# Patient Record
Sex: FEMALE | Race: WHITE | NOT HISPANIC OR LATINO | Employment: FULL TIME | URBAN - METROPOLITAN AREA
[De-identification: names, ages, dates, MRNs, and addresses within clinical notes are randomized per-mention and may not be internally consistent; named-entity substitution may affect disease eponyms.]

---

## 2017-10-11 ENCOUNTER — OFFICE VISIT (OUTPATIENT)
Dept: INTERNAL MEDICINE | Facility: MEDICAL CENTER | Age: 47
End: 2017-10-11

## 2017-10-11 VITALS
DIASTOLIC BLOOD PRESSURE: 106 MMHG | HEART RATE: 95 BPM | RESPIRATION RATE: 18 BRPM | OXYGEN SATURATION: 92 % | WEIGHT: 213.2 LBS | HEIGHT: 62 IN | BODY MASS INDEX: 39.23 KG/M2 | SYSTOLIC BLOOD PRESSURE: 142 MMHG | TEMPERATURE: 98.2 F

## 2017-10-11 DIAGNOSIS — Z76.89 ESTABLISHING CARE WITH NEW DOCTOR, ENCOUNTER FOR: ICD-10-CM

## 2017-10-11 DIAGNOSIS — F31.31 BIPOLAR AFFECTIVE DISORDER, CURRENTLY DEPRESSED, MILD (HCC): ICD-10-CM

## 2017-10-11 DIAGNOSIS — I10 ESSENTIAL HYPERTENSION: ICD-10-CM

## 2017-10-11 DIAGNOSIS — E03.9 HYPOTHYROIDISM, UNSPECIFIED TYPE: ICD-10-CM

## 2017-10-11 DIAGNOSIS — J45.909 UNCOMPLICATED ASTHMA, UNSPECIFIED ASTHMA SEVERITY, UNSPECIFIED WHETHER PERSISTENT: ICD-10-CM

## 2017-10-11 PROCEDURE — 99203 OFFICE O/P NEW LOW 30 MIN: CPT | Mod: GE | Performed by: INTERNAL MEDICINE

## 2017-10-11 RX ORDER — ALBUTEROL SULFATE 90 UG/1
2 AEROSOL, METERED RESPIRATORY (INHALATION) EVERY 6 HOURS PRN
Qty: 8.5 G | Refills: 0 | Status: SHIPPED | OUTPATIENT
Start: 2017-10-11 | End: 2019-02-19

## 2017-10-11 RX ORDER — HYDROCHLOROTHIAZIDE 25 MG/1
25 TABLET ORAL DAILY
COMMUNITY
End: 2017-10-11

## 2017-10-11 RX ORDER — CHLORTHALIDONE 25 MG/1
25 TABLET ORAL DAILY
Qty: 30 TAB | Refills: 0 | Status: SHIPPED | OUTPATIENT
Start: 2017-10-11 | End: 2017-11-13 | Stop reason: SDUPTHER

## 2017-10-11 RX ORDER — ALBUTEROL SULFATE 90 UG/1
2 AEROSOL, METERED RESPIRATORY (INHALATION) EVERY 6 HOURS PRN
COMMUNITY
End: 2017-10-11 | Stop reason: SDUPTHER

## 2017-10-11 RX ORDER — OMEPRAZOLE 40 MG/1
40 CAPSULE, DELAYED RELEASE ORAL DAILY
COMMUNITY
End: 2018-01-08 | Stop reason: SDUPTHER

## 2017-10-11 RX ORDER — FLUOXETINE HYDROCHLORIDE 40 MG/1
40 CAPSULE ORAL DAILY
COMMUNITY
End: 2017-10-11 | Stop reason: SDUPTHER

## 2017-10-11 RX ORDER — BUPROPION HYDROCHLORIDE 150 MG/1
150 TABLET, EXTENDED RELEASE ORAL 2 TIMES DAILY
Qty: 60 TAB | Refills: 0 | Status: SHIPPED | OUTPATIENT
Start: 2017-10-11 | End: 2017-11-13 | Stop reason: SDUPTHER

## 2017-10-11 RX ORDER — BUPROPION HYDROCHLORIDE 150 MG/1
150 TABLET, EXTENDED RELEASE ORAL 2 TIMES DAILY
COMMUNITY
End: 2017-10-11 | Stop reason: SDUPTHER

## 2017-10-11 RX ORDER — GEMFIBROZIL 600 MG/1
600 TABLET, FILM COATED ORAL 2 TIMES DAILY
COMMUNITY
End: 2018-01-08 | Stop reason: SDUPTHER

## 2017-10-11 RX ORDER — FLUOXETINE HYDROCHLORIDE 40 MG/1
40 CAPSULE ORAL DAILY
Qty: 30 CAP | Refills: 0 | Status: SHIPPED | OUTPATIENT
Start: 2017-10-11 | End: 2017-11-13 | Stop reason: SDUPTHER

## 2017-10-11 ASSESSMENT — PAIN SCALES - GENERAL: PAINLEVEL: NO PAIN

## 2017-10-11 NOTE — LETTER
Counts include 234 beds at the [facility]  Evaristo Mary M.D.  [address] [facility] [address]  [place] [address]  Fax: [phone]   Authorization for Release/Disclosure of   Protected Health Information   Name: ERIC TORIBIO : 1970 SSN: [id number]   Address: [address] [place] [address] Phone:    [phone] (home)    I authorize the entity listed below to release/disclose the PHI below to:   Counts include 234 beds at the [facility]/Evaristo Mary M.D. and Evaristo Mary M.D.   Provider or Entity Name:  [facility],   Address   City, [facility], Zip  [address] [place] [address] Phone:[phone]      Fax:[phone]     Reason for request: continuity of care   Information to be released:    [  ] LAST COLONOSCOPY,  including any PATH REPORT and follow-up  [  ] LAST FIT/COLOGUARD RESULT [  ] LAST DEXA  [  ] LAST MAMMOGRAM  [  ] LAST PAP  [  ] LAST LABS [  ] RETINA EXAM REPORT  [  ] IMMUNIZATION RECORDS  [  ] Release all info      [  ] Check here and initial the line next to each item to release ALL health information INCLUDING  _____ Care and treatment for drug and / or alcohol abuse  _____ HIV testing, infection status, or AIDS  _____ Genetic Testing    DATES OF SERVICE OR TIME PERIOD TO BE DISCLOSED: _____________  I understand and acknowledge that:  * This Authorization may be revoked at any time by you in writing, except if your health information has already been used or disclosed.  * Your health information that will be used or disclosed as a result of you signing this authorization could be re-disclosed by the recipient. If this occurs, your re-disclosed health information may no longer be protected by State or Federal laws.  * You may refuse to sign this Authorization. Your refusal will not affect your ability to obtain treatment.  * This Authorization becomes effective upon signing and will  on (date) __________.      If no date is indicated, this Authorization will  one (1) year  from the signature date.    Name: Margareth Braun    Signature:   Date:     10/11/2017       PLEASE FAX REQUESTED RECORDS BACK TO: [phone]

## 2017-10-11 NOTE — PATIENT INSTRUCTIONS
Bipolar Disorder  Bipolar disorder is a mental illness. The term bipolar disorder actually is used to describe a group of disorders that all share varying degrees of emotional highs and lows that can interfere with daily functioning, such as work, school, or relationships. Bipolar disorder also can lead to drug abuse, hospitalization, and suicide.  The emotional highs of bipolar disorder are periods of elation or irritability and high energy. These highs can range from a mild form (hypomania) to a severe form (checo). People experiencing episodes of hypomania may appear energetic, excitable, and highly productive. People experiencing checo may behave impulsively or erratically. They often make poor decisions. They may have difficulty sleeping. The most severe episodes of checo can involve having very distorted beliefs or perceptions about the world and seeing or hearing things that are not real (psychotic delusions and hallucinations).   The emotional lows of bipolar disorder (depression) also can range from mild to severe. Severe episodes of bipolar depression can involve psychotic delusions and hallucinations.  Sometimes people with bipolar disorder experience a state of mixed mood. Symptoms of hypomania or checo and depression are both present during this mixed-mood episode.  SIGNS AND SYMPTOMS  There are signs and symptoms of the episodes of hypomania and checo as well as the episodes of depression. The signs and symptoms of hypomania and checo are similar but vary in severity. They include:  · Inflated self-esteem or feeling of increased self-confidence.  · Decreased need for sleep.  · Unusual talkativeness (rapid or pressured speech) or the feeling of a need to keep talking.  · Sensation of racing thoughts or constant talking, with quick shifts between topics that may or may not be related (flight of ideas).  · Decreased ability to focus or concentrate.  · Increased purposeful activity, such as work,  studies, or social activity, or nonproductive activity, such as pacing, squirming and fidgeting, or finger and toe tapping.  · Impulsive behavior and use of poor judgment, resulting in high-risk activities, such as having unprotected sex or spending excessive amounts of money.  Signs and symptoms of depression include the following:   · Feelings of sadness, hopelessness, or helplessness.  · Frequent or uncontrollable episodes of crying.  · Lack of feeling anything or caring about anything.  · Difficulty sleeping or sleeping too much.   · Inability to enjoy the things you used to enjoy.    · Desire to be alone all the time.    · Feelings of guilt or worthlessness.   · Lack of energy or motivation.    · Difficulty concentrating, remembering, or making decisions.   · Change in appetite or weight beyond normal fluctuations.  · Thoughts of death or the desire to harm yourself.  DIAGNOSIS   Bipolar disorder is diagnosed through an assessment by your caregiver. Your caregiver will ask questions about your emotional episodes. There are two main types of bipolar disorder. People with type I bipolar disorder have manic episodes with or without depressive episodes. People with type II bipolar disorder have hypomanic episodes and major depressive episodes, which are more serious than mild depression. The type of bipolar disorder you have can make an important difference in how your illness is monitored and treated.  Your caregiver may ask questions about your medical history and use of alcohol or drugs, including prescription medication. Certain medical conditions and substances also can cause emotional highs and lows that resemble bipolar disorder (secondary bipolar disorder).   TREATMENT   Bipolar disorder is a long-term illness. It is best controlled with continuous treatment rather than treatment only when symptoms occur. The following treatments can be prescribed for bipolar disorders:  · Medication--Medication can be  prescribed by a doctor that is an expert in treating mental disorders (psychiatrists). Medications called mood stabilizers are usually prescribed to help control the illness. Other medications are sometimes added if symptoms of checo, depression, or psychotic delusions and hallucinations occur despite the use of a mood stabilizer.  · Talk therapy--Some forms of talk therapy are helpful in providing support, education, and guidance.  A combination of medication and talk therapy is best for managing the disorder over time. A procedure in which electricity is applied to your brain through your scalp (electroconvulsive therapy) is used in cases of severe checo when medication and talk therapy do not work or work too slowly.     This information is not intended to replace advice given to you by your health care provider. Make sure you discuss any questions you have with your health care provider.     Document Released: 03/26/2002 Document Revised: 01/08/2016 Document Reviewed: 01/13/2014  [employer] Interactive Patient Education ©2016 [employer]  Hypertension  Hypertension, commonly called high blood pressure, is when the force of blood pumping through your arteries is too strong. Your arteries are the blood vessels that carry blood from your heart throughout your body. A blood pressure reading consists of a higher number over a lower number, such as 110/72. The higher number (systolic) is the pressure inside your arteries when your heart pumps. The lower number (diastolic) is the pressure inside your arteries when your heart relaxes. Ideally you want your blood pressure below 120/80.  Hypertension forces your heart to work harder to pump blood. Your arteries may become narrow or stiff. Having untreated or uncontrolled hypertension can cause heart attack, stroke, kidney disease, and other problems.  RISK FACTORS  Some risk factors for high blood pressure are controllable. Others are not.   Risk factors you cannot  control include:   · Race. You may be at higher risk if you are .  · Age. Risk increases with age.  · Gender. Men are at higher risk than women before age 45 years. After age 65, women are at higher risk than men.  Risk factors you can control include:  · Not getting enough exercise or physical activity.  · Being overweight.  · Getting too much fat, sugar, calories, or salt in your diet.  · Drinking too much alcohol.  SIGNS AND SYMPTOMS  Hypertension does not usually cause signs or symptoms. Extremely high blood pressure (hypertensive crisis) may cause headache, anxiety, shortness of breath, and nosebleed.  DIAGNOSIS  To check if you have hypertension, your health care provider will measure your blood pressure while you are seated, with your arm held at the level of your heart. It should be measured at least twice using the same arm. Certain conditions can cause a difference in blood pressure between your right and left arms. A blood pressure reading that is higher than normal on one occasion does not mean that you need treatment. If it is not clear whether you have high blood pressure, you may be asked to return on a different day to have your blood pressure checked again. Or, you may be asked to monitor your blood pressure at home for 1 or more weeks.  TREATMENT  Treating high blood pressure includes making lifestyle changes and possibly taking medicine. Living a healthy lifestyle can help lower high blood pressure. You may need to change some of your habits.  Lifestyle changes may include:  · Following the DASH diet. This diet is high in fruits, vegetables, and whole grains. It is low in salt, red meat, and added sugars.  · Keep your sodium intake below 2,300 mg per day.  · Getting at least 30-45 minutes of aerobic exercise at least 4 times per week.  · Losing weight if necessary.  · Not smoking.  · Limiting alcoholic beverages.  · Learning ways to reduce stress.  Your health care provider may  prescribe medicine if lifestyle changes are not enough to get your blood pressure under control, and if one of the following is true:  · You are 18-59 years of age and your systolic blood pressure is above 140.  · You are 60 years of age or older, and your systolic blood pressure is above 150.  · Your diastolic blood pressure is above 90.  · You have diabetes, and your systolic blood pressure is over 140 or your diastolic blood pressure is over 90.  · You have kidney disease and your blood pressure is above 140/90.  · You have heart disease and your blood pressure is above 140/90.  Your personal target blood pressure may vary depending on your medical conditions, your age, and other factors.  HOME CARE INSTRUCTIONS  · Have your blood pressure rechecked as directed by your health care provider.    · Take medicines only as directed by your health care provider. Follow the directions carefully. Blood pressure medicines must be taken as prescribed. The medicine does not work as well when you skip doses. Skipping doses also puts you at risk for problems.  · Do not smoke.    · Monitor your blood pressure at home as directed by your health care provider.   SEEK MEDICAL CARE IF:   · You think you are having a reaction to medicines taken.  · You have recurrent headaches or feel dizzy.  · You have swelling in your ankles.  · You have trouble with your vision.  SEEK IMMEDIATE MEDICAL CARE IF:  · You develop a severe headache or confusion.  · You have unusual weakness, numbness, or feel faint.  · You have severe chest or abdominal pain.  · You vomit repeatedly.  · You have trouble breathing.  MAKE SURE YOU:   · Understand these instructions.  · Will watch your condition.  · Will get help right away if you are not doing well or get worse.     This information is not intended to replace advice given to you by your health care provider. Make sure you discuss any questions you have with your health care provider.     Document  Released: 12/18/2006 Document Revised: 05/03/2016 Document Reviewed: 10/10/2014  [employer] Interactive Patient Education ©2016 [employer]

## 2017-10-12 NOTE — PROGRESS NOTES
New Patient to Establish    Reason to establish: New patient to establish    CC: Establish care with new doctor, and request a medication refill.    HPI: 47 y/o female with past medical history of bipolar disorder, and hypothyroidism. Presents to the clinic to establish care and to request a refill on medication. Patient reports that she has a long history of bipolar disorder that is getting worse because she is not taking Prozac. She reports that she was having a very good response on Prozac previously. All bipolar medications was prescribed by psychiatrist but because she is self pay with no health insurance she is unable to afford the expenses of the psychiatrist office visit. Patient reports that she is unable to enjoy her daily activities and she is not able to having a good relationship with peoples.  Pt has fatigue, hair falling, weight gain, and constipation.  She has been diagnosed previously with hypothyroidism and was on medication for a while.  Pt denies depressed mood, ideas of hurting herself or others.    Patient Active Problem List    Diagnosis Date Noted   • Hypothyroidism 10/11/2017   • Establishing care with new doctor, encounter for 10/11/2017   • Uncomplicated asthma 10/11/2017   • Bipolar affective disorder, currently depressed, mild (CMS-HCC) 10/11/2017   • Essential hypertension 10/11/2017       Past Medical History:   Diagnosis Date   • Asthma    • Bipolar 1 disorder, depressed (CMS-[facility])    • Hypothyroidism        Current Outpatient Prescriptions   Medication Sig Dispense Refill   • gemfibrozil (LOPID) 600 MG Tab Take 600 mg by mouth 2 times a day.     • omeprazole (PRILOSEC) 40 MG delayed-release capsule Take 40 mg by mouth every day.     • buPROPion SR (WELLBUTRIN SR) 150 MG TABLET SR 12 HR sustained-release tablet Take 1 Tab by mouth 2 times a day. 60 Tab 0   • albuterol (VENTOLIN HFA) 108 (90 Base) MCG/ACT Aero Soln inhalation aerosol Inhale 2 Puffs by mouth every 6 hours as needed for  "Shortness of Breath. 8.5 g 0   • fluoxetine (PROZAC) 40 MG capsule Take 1 Cap by mouth every day. 30 Cap 0   • chlorthalidone (HYGROTON) 25 MG Tab Take 1 Tab by mouth every day. 30 Tab 0     No current facility-administered medications for this visit.        Allergies as of 10/11/2017 - Reviewed 10/11/2017   Allergen Reaction Noted   • Codeine Swelling 10/11/2017   • Penicillins Hives 10/11/2017       Social History     Social History   • Marital status: Single     Spouse name: N/A   • Number of children: N/A   • Years of education: N/A     Occupational History   • Not on file.     Social History Main Topics   • Smoking status: Former Smoker     Years: 25.00   • Smokeless tobacco: Never Used      Comment: 3-4 cigarrette per day   • Alcohol use No   • Drug use: No   • Sexual activity: No     Other Topics Concern   • Not on file     Social History Narrative   • No narrative on file       No family history on file.    Past Surgical History:   Procedure Laterality Date   • HYSTERECTOMY, TOTAL ABDOMINAL         ROS: As per HPI. Additional pertinent symptoms as noted below.    All others negative    /106   Pulse 95   Temp 36.8 °C (98.2 °F)   Resp 18   Ht 1.575 m (5' 2\")   Wt 96.7 kg (213 lb 3.2 oz)   SpO2 92%   Breastfeeding? No   BMI 38.99 kg/m²     Physical Exam  General:  Alert and oriented, No apparent distress.    Eyes: Pupils equal and reactive. No scleral icterus.    Throat: Clear no erythema or exudates noted.    Neck: Supple. No lymphadenopathy noted. Thyroid not enlarged.    Lungs: Clear to auscultation and percussion bilaterally.    Cardiovascular: Regular rate and rhythm. No murmurs, rubs or gallops.    Abdomen:  Benign. No rebound or guarding noted.    Extremities: No clubbing, cyanosis, edema.    Skin: Clear. No rash or suspicious skin lesions noted.    Other:     Note: I have reviewed all pertinent labs and diagnostic tests associated with this visit with specific comments listed under the " assessment and plan below    Assessment and Plan    1. Hypothyroidism, unspecified type  - Pt has been diagnosed previously with hypothyroidism and has been on medication for a while.  - Pt reports a very good response on hypothyroidism medication.  - Pt has fatigue, hair falling, weight gain, and constipation.  Plan  - TSH with T4.  - follow up in 3 weeks.    2. Establishing care with new doctor, encounter for  - Pt wants to establish care with new doctor.  - Pt has a recent lab work done in [place] and she sign the consent to release her information to us.    3. Uncomplicated asthma, unspecified asthma severity, unspecified whether persistent  - Hx of asthma for 3 years.  - the recent asthma attack was long time ago (Pt does not remember when)  - Pt using albuterol PRN (about once in a month)  - On examination, lungs are clear bilaterally with no additional sounds or wheezes.  Plan  - Refill albuterol and continue on same medication PRN.    4. Bipolar affective disorder, currently depressed, mild (CMS-HCC)  - long Hx of Bipolar disorder with depression.  - Pt reports a very good response on Prozac previously.  Plan  - prescribe Prozac 40 mg cap.  - follow up in 3 weeks.    5. Essential hypertension  - Hx of HTN treated with hydrochlorothiazide 25 mg.  - Pt reports that her blood pressure is consistently high with hydrochlorothiazide.  Impression: under controlled hypertension.  Plan  - Prescribe chlorthalidone 25 mg once daily and follow up.      Followup: Return in about 3 weeks (around 11/1/2017).    Risk Assessment (discuss potential complications a function of chronic problems): risk assessment has been discussed with the patient.    Complexity (discuss number of co-morbidities): Co-morbidities have been discussed with the patient, please see above.    Signed by: Evaristo Mary M.D.

## 2017-11-13 DIAGNOSIS — I10 ESSENTIAL HYPERTENSION: ICD-10-CM

## 2017-11-13 DIAGNOSIS — F31.31 BIPOLAR AFFECTIVE DISORDER, CURRENTLY DEPRESSED, MILD (HCC): ICD-10-CM

## 2017-11-13 NOTE — TELEPHONE ENCOUNTER
Last seen: 10/11/17 by Dr. Dangelo  Next appt: 11/27/17 with Dr. Dangelo    Was the patient seen in the last year in this department? Yes   Does patient have an active prescription for medications requested? No   Received Request Via: Pharmacy

## 2017-11-19 RX ORDER — CHLORTHALIDONE 25 MG/1
TABLET ORAL
Qty: 90 TAB | Refills: 0 | Status: SHIPPED | OUTPATIENT
Start: 2017-11-19 | End: 2018-02-01 | Stop reason: SDUPTHER

## 2017-11-19 RX ORDER — BUPROPION HYDROCHLORIDE 150 MG/1
TABLET, EXTENDED RELEASE ORAL
Qty: 180 TAB | Refills: 0 | Status: SHIPPED | OUTPATIENT
Start: 2017-11-19 | End: 2018-02-01 | Stop reason: SDUPTHER

## 2017-11-19 RX ORDER — FLUOXETINE HYDROCHLORIDE 40 MG/1
CAPSULE ORAL
Qty: 90 CAP | Refills: 0 | Status: SHIPPED | OUTPATIENT
Start: 2017-11-19 | End: 2018-02-01 | Stop reason: SDUPTHER

## 2017-11-27 ENCOUNTER — APPOINTMENT (OUTPATIENT)
Dept: INTERNAL MEDICINE | Facility: MEDICAL CENTER | Age: 47
End: 2017-11-27

## 2018-01-08 RX ORDER — OMEPRAZOLE 40 MG/1
40 CAPSULE, DELAYED RELEASE ORAL DAILY
Qty: 30 CAP | Refills: 1 | Status: SHIPPED | OUTPATIENT
Start: 2018-01-08 | End: 2019-02-19

## 2018-01-08 RX ORDER — GEMFIBROZIL 600 MG/1
600 TABLET, FILM COATED ORAL 2 TIMES DAILY
Qty: 60 TAB | Refills: 1 | Status: SHIPPED | OUTPATIENT
Start: 2018-01-08 | End: 2019-02-19

## 2018-01-08 NOTE — TELEPHONE ENCOUNTER
Last seen: 10/11/17 by Dr. Dangelo  Next appt: None     Was the patient seen in the last year in this department? Yes   Does patient have an active prescription for medications requested? No   Received Request Via: Pharmacy

## 2018-02-01 DIAGNOSIS — F31.31 BIPOLAR AFFECTIVE DISORDER, CURRENTLY DEPRESSED, MILD (HCC): ICD-10-CM

## 2018-02-01 DIAGNOSIS — I10 ESSENTIAL HYPERTENSION: ICD-10-CM

## 2018-02-02 RX ORDER — CHLORTHALIDONE 25 MG/1
TABLET ORAL
Qty: 30 TAB | Refills: 0 | Status: SHIPPED | OUTPATIENT
Start: 2018-02-02 | End: 2018-03-25 | Stop reason: SDUPTHER

## 2018-02-02 RX ORDER — FLUOXETINE HYDROCHLORIDE 40 MG/1
CAPSULE ORAL
Qty: 30 CAP | Refills: 0 | Status: SHIPPED | OUTPATIENT
Start: 2018-02-02 | End: 2018-03-25 | Stop reason: SDUPTHER

## 2018-02-02 RX ORDER — BUPROPION HYDROCHLORIDE 150 MG/1
TABLET, EXTENDED RELEASE ORAL
Qty: 60 TAB | Refills: 0 | Status: SHIPPED | OUTPATIENT
Start: 2018-02-02 | End: 2019-02-19

## 2018-02-02 NOTE — TELEPHONE ENCOUNTER
Last seen: 10/11/17 by Dr. Mary  Next appt: None    Was the patient seen in the last year in this department? Yes   Does patient have an active prescription for medications requested? No   Received Request Via: Pharmacy

## 2018-03-25 DIAGNOSIS — F31.31 BIPOLAR AFFECTIVE DISORDER, CURRENTLY DEPRESSED, MILD (HCC): ICD-10-CM

## 2018-03-25 DIAGNOSIS — I10 ESSENTIAL HYPERTENSION: ICD-10-CM

## 2018-03-26 NOTE — TELEPHONE ENCOUNTER
Please call the patient to make an appointment as her medical condition needs to be follow up, last office visit was 10/2017.  Thanks

## 2018-03-27 NOTE — TELEPHONE ENCOUNTER
PT SEEN: 10/11/17 WITH Dr. Mary NEXT APPT None   Was the patient seen in the last year in this department? Yes     Does patient have an active prescription for medications requested? No     Received Request Via: Pharmacy

## 2018-03-28 RX ORDER — CHLORTHALIDONE 25 MG/1
TABLET ORAL
Qty: 90 TAB | Refills: 0 | Status: SHIPPED | OUTPATIENT
Start: 2018-03-28 | End: 2018-07-11 | Stop reason: SDUPTHER

## 2018-03-28 RX ORDER — FLUOXETINE HYDROCHLORIDE 40 MG/1
CAPSULE ORAL
Qty: 90 CAP | Refills: 0 | Status: SHIPPED | OUTPATIENT
Start: 2018-03-28 | End: 2018-07-11 | Stop reason: SDUPTHER

## 2018-04-11 ENCOUNTER — OFFICE VISIT (OUTPATIENT)
Dept: URGENT CARE | Facility: PHYSICIAN GROUP | Age: 48
End: 2018-04-11
Payer: COMMERCIAL

## 2018-04-11 VITALS
TEMPERATURE: 96.5 F | SYSTOLIC BLOOD PRESSURE: 128 MMHG | HEIGHT: 62 IN | HEART RATE: 90 BPM | WEIGHT: 190 LBS | DIASTOLIC BLOOD PRESSURE: 84 MMHG | BODY MASS INDEX: 34.96 KG/M2 | OXYGEN SATURATION: 95 %

## 2018-04-11 DIAGNOSIS — J06.9 VIRAL URI: ICD-10-CM

## 2018-04-11 LAB
FLUAV+FLUBV AG SPEC QL IA: NORMAL
INT CON NEG: NEGATIVE
INT CON POS: POSITIVE

## 2018-04-11 PROCEDURE — 99203 OFFICE O/P NEW LOW 30 MIN: CPT | Performed by: FAMILY MEDICINE

## 2018-04-11 PROCEDURE — 87804 INFLUENZA ASSAY W/OPTIC: CPT | Performed by: FAMILY MEDICINE

## 2018-04-11 RX ORDER — BENZONATATE 200 MG/1
200 CAPSULE ORAL 3 TIMES DAILY PRN
Qty: 60 CAP | Refills: 0 | Status: SHIPPED | OUTPATIENT
Start: 2018-04-11 | End: 2019-02-19

## 2018-04-11 NOTE — LETTER
April 11, 2018         Patient: Margareth Braun   YOB: 1970   Date of Visit: 4/11/2018           To Whom it May Concern:    Margareth Braun was seen in my clinic on 4/11/2018.      Please excuse her from her shift tonight.    If you have any questions or concerns, please don't hesitate to call.        Sincerely,           Damaso Bassett M.D.  Electronically Signed

## 2018-04-12 NOTE — PROGRESS NOTES
CC:  cough        Cough  This is a new problem. The current episode started 2 days ago. The problem has been unchanged. The problem occurs constantly. The cough is dry. Associated symptoms include nasal congestion. Pertinent negatives include no fever, headaches, nausea, vomiting, diarrhea, sweats, weight loss or wheezing. Nothing aggravates the symptoms.  Patient has tried nothing for the symptoms. There is no history of asthma.          Current Outpatient Prescriptions on File Prior to Visit   Medication Sig Dispense Refill   • chlorthalidone (HYGROTON) 25 MG Tab TAKE ONE TABLET BY MOUTH ONCE DAILY 90 Tab 0   • fluoxetine (PROZAC) 40 MG capsule TAKE ONE CAPSULE BY MOUTH ONCE DAILY 90 Cap 0   • omeprazole (PRILOSEC) 40 MG delayed-release capsule Take 1 Cap by mouth every day. 30 Cap 1   • albuterol (VENTOLIN HFA) 108 (90 Base) MCG/ACT Aero Soln inhalation aerosol Inhale 2 Puffs by mouth every 6 hours as needed for Shortness of Breath. 8.5 g 0   • buPROPion SR (WELLBUTRIN-SR) 150 MG TABLET SR 12 HR sustained-release tablet TAKE ONE TABLET BY MOUTH TWICE DAILY 60 Tab 0   • gemfibrozil (LOPID) 600 MG Tab Take 1 Tab by mouth 2 times a day. 60 Tab 1     No current facility-administered medications on file prior to visit.          Social History   Substance Use Topics   • Smoking status: Former Smoker     Years: 25.00   • Smokeless tobacco: Never Used      Comment: 3-4 cigarrette per day   • Alcohol use No           Review of Systems   Constitutional: Negative for fever and weight loss.   HENT: negative for ear pain.    Cardiovascular - denies chest pain or dyspnea  Respiratory: Positive for cough.  .  Negative for wheezing.    Neurological: Negative for headaches.   GI - denies nausea, vomiting or diarrhea  Neuro - denies numbness or tingling.   Skin - denies rash, itching   - denies dysuria, d/c  Psych - denies depression, anxiety  10 point ROS otherwise negative, except per HPI             Objective:     Blood  "pressure 128/84, pulse 90, temperature 35.8 °C (96.5 °F), height 1.575 m (5' 2\"), weight 86.2 kg (190 lb), SpO2 95 %.      Physical Exam   Constitutional: patient is oriented to person, place, and time. Patient appears well-developed and well-nourished. No distress.   HENT:   Head: Normocephalic and atraumatic.   Right Ear: External ear normal.   Left Ear: External ear normal.   Nose: Mucosal edema  present. Right sinus exhibits no maxillary sinus tenderness. Left sinus exhibits no maxillary sinus tenderness.   Mouth/Throat: Mucous membranes are normal. No oral lesions. Posterior oropharyngeal erythema present. No oropharyngeal exudate or posterior oropharyngeal edema.   Eyes: Conjunctivae and EOM are normal. Pupils are equal, round, and reactive to light. Right eye exhibits no discharge. Left eye exhibits no discharge. No scleral icterus.   Neck: Normal range of motion. Neck supple. No tracheal deviation present.   Cardiovascular: Normal rate, regular rhythm and normal heart sounds.  Exam reveals no friction rub.    Pulmonary/Chest: Effort normal. No respiratory distress. Patient has no wheezes or rhonchi. Patient has no rales.    Musculoskeletal:  exhibits no edema.   Lymphadenopathy:     Patient has cervical adenopathy.   Neurological: patient is alert and oriented to person, place, and time.   Skin: Skin is warm and dry. No rash noted. No erythema.   Psychiatric: patient  has a normal mood and affect.  behavior is normal.   Nursing note and vitals reviewed.              Assessment/Plan:         1. Viral URI     Influenza negative  - benzonatate (TESSALON) 200 MG capsule; Take 1 Cap by mouth 3 times a day as needed for Cough.  Dispense: 60 Cap; Refill: 0    Follow up in one week if no improvement, sooner if symptoms worsen.       "

## 2018-07-11 ENCOUNTER — HOSPITAL ENCOUNTER (OUTPATIENT)
Dept: LAB | Facility: MEDICAL CENTER | Age: 48
End: 2018-07-11
Attending: STUDENT IN AN ORGANIZED HEALTH CARE EDUCATION/TRAINING PROGRAM
Payer: COMMERCIAL

## 2018-07-11 ENCOUNTER — OFFICE VISIT (OUTPATIENT)
Dept: URGENT CARE | Facility: PHYSICIAN GROUP | Age: 48
End: 2018-07-11
Payer: COMMERCIAL

## 2018-07-11 VITALS
TEMPERATURE: 97.7 F | HEIGHT: 62 IN | HEART RATE: 76 BPM | DIASTOLIC BLOOD PRESSURE: 92 MMHG | SYSTOLIC BLOOD PRESSURE: 144 MMHG | OXYGEN SATURATION: 96 % | BODY MASS INDEX: 34.96 KG/M2 | RESPIRATION RATE: 15 BRPM | WEIGHT: 190 LBS

## 2018-07-11 DIAGNOSIS — Z76.89 ESTABLISHING CARE WITH NEW DOCTOR, ENCOUNTER FOR: ICD-10-CM

## 2018-07-11 DIAGNOSIS — F31.31 BIPOLAR AFFECTIVE DISORDER, CURRENTLY DEPRESSED, MILD (HCC): ICD-10-CM

## 2018-07-11 DIAGNOSIS — E03.9 HYPOTHYROIDISM, UNSPECIFIED TYPE: ICD-10-CM

## 2018-07-11 DIAGNOSIS — I10 ESSENTIAL HYPERTENSION: ICD-10-CM

## 2018-07-11 LAB
ALBUMIN SERPL BCP-MCNC: 3.9 G/DL (ref 3.2–4.9)
ALBUMIN/GLOB SERPL: 1.4 G/DL
ALP SERPL-CCNC: 53 U/L (ref 30–99)
ALT SERPL-CCNC: 13 U/L (ref 2–50)
ANION GAP SERPL CALC-SCNC: 10 MMOL/L (ref 0–11.9)
AST SERPL-CCNC: 15 U/L (ref 12–45)
BILIRUB SERPL-MCNC: 0.4 MG/DL (ref 0.1–1.5)
BUN SERPL-MCNC: 18 MG/DL (ref 8–22)
CALCIUM SERPL-MCNC: 9.5 MG/DL (ref 8.5–10.5)
CHLORIDE SERPL-SCNC: 101 MMOL/L (ref 96–112)
CO2 SERPL-SCNC: 24 MMOL/L (ref 20–33)
CREAT SERPL-MCNC: 0.79 MG/DL (ref 0.5–1.4)
GLOBULIN SER CALC-MCNC: 2.7 G/DL (ref 1.9–3.5)
GLUCOSE SERPL-MCNC: 78 MG/DL (ref 65–99)
POTASSIUM SERPL-SCNC: 3.7 MMOL/L (ref 3.6–5.5)
PROT SERPL-MCNC: 6.6 G/DL (ref 6–8.2)
SODIUM SERPL-SCNC: 135 MMOL/L (ref 135–145)
TSH SERPL DL<=0.005 MIU/L-ACNC: 1.95 UIU/ML (ref 0.38–5.33)

## 2018-07-11 PROCEDURE — 36415 COLL VENOUS BLD VENIPUNCTURE: CPT

## 2018-07-11 PROCEDURE — 84443 ASSAY THYROID STIM HORMONE: CPT

## 2018-07-11 PROCEDURE — 99214 OFFICE O/P EST MOD 30 MIN: CPT | Performed by: FAMILY MEDICINE

## 2018-07-11 PROCEDURE — 80053 COMPREHEN METABOLIC PANEL: CPT

## 2018-07-11 RX ORDER — CHLORTHALIDONE 25 MG/1
25 TABLET ORAL DAILY
Qty: 30 TAB | Refills: 0 | Status: SHIPPED | OUTPATIENT
Start: 2018-07-11 | End: 2019-02-19

## 2018-07-11 RX ORDER — FLUOXETINE HYDROCHLORIDE 40 MG/1
40 CAPSULE ORAL DAILY
Qty: 30 CAP | Refills: 0 | Status: SHIPPED | OUTPATIENT
Start: 2018-07-11 | End: 2019-02-19

## 2018-07-11 RX ORDER — FLUTICASONE PROPIONATE 50 MCG
1 SPRAY, SUSPENSION (ML) NASAL DAILY
Qty: 16 G | Refills: 0 | Status: SHIPPED | OUTPATIENT
Start: 2018-07-11 | End: 2019-02-19

## 2018-07-14 ASSESSMENT — ENCOUNTER SYMPTOMS
DEPRESSION: 1
VOMITING: 0
NERVOUS/ANXIOUS: 1
NAUSEA: 0
HEADACHES: 0

## 2018-07-14 NOTE — PROGRESS NOTES
"Subjective:   Margareth SMITH is a 47 y.o. female who presents for Manic Behavior (BP meds and Prozac. New here needs PCP)        Depression   This is a chronic problem. The current episode started more than 1 year ago. The problem occurs constantly. The problem has been waxing and waning. Pertinent negatives include no headaches, nausea or vomiting.     Review of Systems   Gastrointestinal: Negative for nausea and vomiting.   Neurological: Negative for headaches.   Psychiatric/Behavioral: Positive for depression. The patient is nervous/anxious.      Allergies   Allergen Reactions   • Codeine Swelling     Throat edema   • Penicillins Hives     rash      Objective:   /92   Pulse 76   Temp 36.5 °C (97.7 °F)   Resp 15   Ht 1.575 m (5' 2\")   Wt 86.2 kg (190 lb)   SpO2 96%   BMI 34.75 kg/m²   Physical Exam   Constitutional: She is oriented to person, place, and time. She appears well-developed and well-nourished. No distress.   HENT:   Head: Normocephalic and atraumatic.   Eyes: Conjunctivae and EOM are normal. Pupils are equal, round, and reactive to light.   Cardiovascular: Normal rate and regular rhythm.    No murmur heard.  Pulmonary/Chest: Effort normal and breath sounds normal. No respiratory distress.   Abdominal: Soft. She exhibits no distension. There is no tenderness.   Neurological: She is alert and oriented to person, place, and time. She has normal reflexes. No sensory deficit.   Skin: Skin is warm and dry.   Psychiatric: She has a normal mood and affect.         Assessment/Plan:   Assessment    1. Bipolar affective disorder, currently depressed, mild (HCC)  - fluoxetine (PROZAC) 40 MG capsule; Take 1 Cap by mouth every day.  Dispense: 30 Cap; Refill: 0    2. Essential hypertension  - chlorthalidone (HYGROTON) 25 MG Tab; Take 1 Tab by mouth every day.  Dispense: 30 Tab; Refill: 0  - fluticasone (FLONASE) 50 MCG/ACT nasal spray; Spray 1 Spray in nose every day.  Dispense: 16 g; Refill: " 0    Differential diagnosis, natural history, supportive care, and indications for immediate follow-up discussed.

## 2018-08-09 DIAGNOSIS — I10 ESSENTIAL HYPERTENSION: ICD-10-CM

## 2018-08-09 DIAGNOSIS — F31.31 BIPOLAR AFFECTIVE DISORDER, CURRENTLY DEPRESSED, MILD (HCC): ICD-10-CM

## 2018-08-09 RX ORDER — FLUOXETINE HYDROCHLORIDE 40 MG/1
CAPSULE ORAL
Refills: 0 | OUTPATIENT
Start: 2018-08-09

## 2018-08-09 RX ORDER — CHLORTHALIDONE 25 MG/1
TABLET ORAL
Refills: 0 | OUTPATIENT
Start: 2018-08-09

## 2018-08-20 RX ORDER — FLUOXETINE HYDROCHLORIDE 40 MG/1
40 CAPSULE ORAL DAILY
Qty: 30 CAP | Refills: 3 | Status: SHIPPED | OUTPATIENT
Start: 2018-08-20 | End: 2019-01-22 | Stop reason: SDUPTHER

## 2018-08-20 RX ORDER — CHLORTHALIDONE 25 MG/1
25 TABLET ORAL DAILY
Qty: 30 TAB | Refills: 3 | Status: SHIPPED | OUTPATIENT
Start: 2018-08-20 | End: 2019-01-22 | Stop reason: SDUPTHER

## 2018-08-21 NOTE — TELEPHONE ENCOUNTER
Patient called and wanted to let you know that she had her blood work done and she went into Urgent Care in [place] because she thought she could do that.  She didn't know she had to come directly to you.  She stated she is leaving for two weeks and was wondering if you can just give her a refill for two until she comes back and sees you.  Patient is leaving at 7am tomorrow morning.  If you are able to send the medication to patient please call her and let her know you send them so she is able to pick them up before she leaves out of town. Please send it in to [facility] on [address]

## 2018-12-06 ENCOUNTER — OFFICE VISIT (OUTPATIENT)
Dept: URGENT CARE | Facility: PHYSICIAN GROUP | Age: 48
End: 2018-12-06
Payer: COMMERCIAL

## 2018-12-06 VITALS
HEIGHT: 62 IN | HEART RATE: 89 BPM | BODY MASS INDEX: 33.13 KG/M2 | TEMPERATURE: 98 F | RESPIRATION RATE: 18 BRPM | SYSTOLIC BLOOD PRESSURE: 122 MMHG | WEIGHT: 180 LBS | OXYGEN SATURATION: 95 % | DIASTOLIC BLOOD PRESSURE: 82 MMHG

## 2018-12-06 DIAGNOSIS — R09.82 PND (POST-NASAL DRIP): ICD-10-CM

## 2018-12-06 DIAGNOSIS — J45.21 MILD INTERMITTENT ASTHMA WITH EXACERBATION: ICD-10-CM

## 2018-12-06 DIAGNOSIS — J02.9 PHARYNGITIS, UNSPECIFIED ETIOLOGY: ICD-10-CM

## 2018-12-06 PROCEDURE — 99214 OFFICE O/P EST MOD 30 MIN: CPT | Performed by: PHYSICIAN ASSISTANT

## 2018-12-06 RX ORDER — FLUTICASONE PROPIONATE 50 MCG
2 SPRAY, SUSPENSION (ML) NASAL DAILY
Qty: 16 G | Refills: 0 | Status: SHIPPED | OUTPATIENT
Start: 2018-12-06 | End: 2019-02-19

## 2018-12-06 RX ORDER — ALBUTEROL SULFATE 90 UG/1
2 AEROSOL, METERED RESPIRATORY (INHALATION) EVERY 6 HOURS PRN
Qty: 8.5 G | Refills: 2 | Status: SHIPPED | OUTPATIENT
Start: 2018-12-06 | End: 2019-02-19

## 2018-12-06 RX ORDER — ACETAMINOPHEN 325 MG/1
325 TABLET ORAL EVERY 6 HOURS PRN
Qty: 30 TAB | Refills: 0 | Status: SHIPPED | OUTPATIENT
Start: 2018-12-06 | End: 2019-02-19

## 2018-12-06 ASSESSMENT — ENCOUNTER SYMPTOMS
FEVER: 0
CHILLS: 0
ABDOMINAL PAIN: 0
COUGH: 1
SPUTUM PRODUCTION: 0
SORE THROAT: 1
WHEEZING: 0
SHORTNESS OF BREATH: 0
NAUSEA: 0
DIARRHEA: 0
VOMITING: 0

## 2018-12-06 NOTE — PROGRESS NOTES
"Subjective:   Margareth SMITH is a 48 y.o. female who presents for Pharyngitis (x 3 days// swollen lymph nodes// stiff neck// headache )        Pharyngitis    This is a new problem. The current episode started in the past 7 days. Associated symptoms include congestion and coughing. Pertinent negatives include no abdominal pain, diarrhea, ear pain, shortness of breath or vomiting.     Notes last 3d of fever/chills low grd, ST, c/o mild cough, denies ear pain, c/o sinus pressure and congestion, c/o some PND, c/o emesis two days ago, diarrhea noted resolved last weekend, thought to be stomach flu, c/o wheeze, needs refill of MDI, PMH of bronchitis/pneumonia years ago, does have seasonal allerg.     Review of Systems   Constitutional: Negative for chills and fever.   HENT: Positive for congestion and sore throat. Negative for ear pain.    Respiratory: Positive for cough. Negative for sputum production, shortness of breath and wheezing.    Gastrointestinal: Negative for abdominal pain, diarrhea, nausea and vomiting.   Skin: Negative for rash.   Endo/Heme/Allergies: Positive for environmental allergies.     Allergies   Allergen Reactions   • Codeine Swelling     Throat edema   • Penicillins Hives     rash   I have worn a mask for the entire encounter with this patient.    Objective:   /82 (BP Location: Left arm, Patient Position: Sitting)   Pulse 89   Temp 36.7 °C (98 °F) (Temporal)   Resp 18   Ht 1.575 m (5' 2\")   Wt 81.6 kg (180 lb)   SpO2 95%   BMI 32.92 kg/m²   Physical Exam   Constitutional: She is oriented to person, place, and time. She appears well-developed and well-nourished. No distress.   HENT:   Head: Normocephalic and atraumatic.   Right Ear: Tympanic membrane, external ear and ear canal normal.   Left Ear: Tympanic membrane, external ear and ear canal normal.   Nose: Nose normal.   Mouth/Throat: Uvula is midline and mucous membranes are normal. Posterior oropharyngeal erythema ( mild PND) " present. No oropharyngeal exudate, posterior oropharyngeal edema or tonsillar abscesses.   Eyes: Conjunctivae and lids are normal. Right eye exhibits no discharge. Left eye exhibits no discharge. No scleral icterus.   Neck: Neck supple.   Pulmonary/Chest: Effort normal. No respiratory distress. She has no decreased breath sounds. She has no wheezes. She has no rhonchi. She has no rales.   Musculoskeletal: Normal range of motion.   Lymphadenopathy:     She has cervical adenopathy ( mild bilat).   Neurological: She is alert and oriented to person, place, and time. She is not disoriented.   Skin: Skin is warm and dry. She is not diaphoretic. No erythema. No pallor.   Psychiatric: Her speech is normal and behavior is normal.   Nursing note and vitals reviewed.    Assessment/Plan:   1. Pharyngitis, unspecified etiology  - acetaminophen (TYLENOL) 325 MG Tab; Take 1 Tab by mouth every 6 hours as needed for Moderate Pain.  Dispense: 30 Tab; Refill: 0    2. Mild intermittent asthma with exacerbation  - albuterol 108 (90 Base) MCG/ACT Aero Soln inhalation aerosol; Inhale 2 Puffs by mouth every 6 hours as needed for Shortness of Breath.  Dispense: 8.5 g; Refill: 2    3. PND (post-nasal drip)  - lidocaine viscous 2% (XYLOCAINE) 2 % Solution; Take 5 mL by mouth as needed for Throat/Mouth Pain (q6hr PRN throat pain, ok to rinse and spit or swallow).  Dispense: 120 mL; Refill: 0  - fluticasone (FLONASE) 50 MCG/ACT nasal spray; Spray 2 Sprays in nose every day.  Dispense: 16 g; Refill: 0  Supportive care is reviewed with patient/caregiver - recommend to push PO fluids and electrolytes, Nsaids/tylenol, netti pot/saline irrig, humidifier in home, flonase, ponaris, antihistamine, viscous lido, suspect viral URI, Return to clinic with lack of resolution or progression of symptoms.    Differential diagnosis, natural history, supportive care, and indications for immediate follow-up discussed.

## 2018-12-06 NOTE — LETTER
December 6, 2018       Patient: Margareth SMITH   YOB: 1970   Date of Visit: 12/6/2018         To Whom It May Concern:    It is my medical opinion that Margareth SMITH should be excused from for tonight and tomorrow night due to illness.        If you have any questions or concerns, please don't hesitate to call [phone]          Sincerely,          Victor Manuel Philip P.A.-C.  Electronically Signed

## 2018-12-09 ENCOUNTER — OFFICE VISIT (OUTPATIENT)
Dept: URGENT CARE | Facility: PHYSICIAN GROUP | Age: 48
End: 2018-12-09
Payer: COMMERCIAL

## 2018-12-09 VITALS
HEIGHT: 62 IN | WEIGHT: 180 LBS | BODY MASS INDEX: 33.13 KG/M2 | RESPIRATION RATE: 20 BRPM | DIASTOLIC BLOOD PRESSURE: 98 MMHG | TEMPERATURE: 97 F | SYSTOLIC BLOOD PRESSURE: 122 MMHG | HEART RATE: 102 BPM | OXYGEN SATURATION: 96 %

## 2018-12-09 DIAGNOSIS — J22 ACUTE LOWER RESPIRATORY INFECTION: ICD-10-CM

## 2018-12-09 PROCEDURE — 99214 OFFICE O/P EST MOD 30 MIN: CPT | Performed by: NURSE PRACTITIONER

## 2018-12-09 RX ORDER — DOXYCYCLINE HYCLATE 100 MG
100 TABLET ORAL 2 TIMES DAILY
Qty: 14 TAB | Refills: 0 | Status: SHIPPED | OUTPATIENT
Start: 2018-12-09 | End: 2019-02-19

## 2018-12-09 RX ORDER — METHYLPREDNISOLONE 4 MG/1
TABLET ORAL
Qty: 1 KIT | Refills: 0 | Status: SHIPPED | OUTPATIENT
Start: 2018-12-09 | End: 2019-02-19

## 2018-12-09 RX ORDER — BENZONATATE 200 MG/1
200 CAPSULE ORAL 3 TIMES DAILY PRN
Qty: 60 CAP | Refills: 0 | Status: SHIPPED | OUTPATIENT
Start: 2018-12-09 | End: 2019-02-19

## 2018-12-09 ASSESSMENT — ENCOUNTER SYMPTOMS
SHORTNESS OF BREATH: 0
HEADACHES: 1
COUGH: 1
ORTHOPNEA: 0
WHEEZING: 0
NAUSEA: 0
MYALGIAS: 0
SORE THROAT: 1
EYE DISCHARGE: 0
SPUTUM PRODUCTION: 1
CHILLS: 0
FEVER: 0
DIARRHEA: 0

## 2018-12-10 NOTE — PROGRESS NOTES
Subjective:      Margareth SMITH is a 48 y.o. female who presents with Cough (sore [id number] )            HPI Recurrent problem. 48 year old female with sore throat, cough and congestion for 8 days. She has hoarseness as well. She denies fever, chills, myalgia, nausea or diarrhea. She was seen in clinic 3 days ago with same symptoms with rx for lidocaine, tylenol. She reports no improvement. History of asthma, she has no shortness of breath or wheezing.  Codeine and Penicillins  Current Outpatient Prescriptions on File Prior to Visit   Medication Sig Dispense Refill   • albuterol 108 (90 Base) MCG/ACT Aero Soln inhalation aerosol Inhale 2 Puffs by mouth every 6 hours as needed for Shortness of Breath. 8.5 g 2   • lidocaine viscous 2% (XYLOCAINE) 2 % Solution Take 5 mL by mouth as needed for Throat/Mouth Pain (q6hr PRN throat pain, ok to rinse and spit or swallow). 120 mL 0   • acetaminophen (TYLENOL) 325 MG Tab Take 1 Tab by mouth every 6 hours as needed for Moderate Pain. 30 Tab 0   • fluticasone (FLONASE) 50 MCG/ACT nasal spray Spray 2 Sprays in nose every day. 16 g 0   • chlorthalidone (HYGROTON) 25 MG Tab Take 1 Tab by mouth every day. 30 Tab 3   • fluoxetine (PROZAC) 40 MG capsule Take 1 Cap by mouth every day. 30 Cap 3   • fluoxetine (PROZAC) 40 MG capsule Take 1 Cap by mouth every day. 30 Cap 0   • chlorthalidone (HYGROTON) 25 MG Tab Take 1 Tab by mouth every day. 30 Tab 0   • fluticasone (FLONASE) 50 MCG/ACT nasal spray Spray 1 Spray in nose every day. (Patient not taking: Reported on 12/6/2018) 16 g 0   • benzonatate (TESSALON) 200 MG capsule Take 1 Cap by mouth 3 times a day as needed for Cough. (Patient not taking: Reported on 12/6/2018) 60 Cap 0   • buPROPion SR (WELLBUTRIN-SR) 150 MG TABLET SR 12 HR sustained-release tablet TAKE ONE TABLET BY MOUTH TWICE DAILY 60 Tab 0   • omeprazole (PRILOSEC) 40 MG delayed-release capsule Take 1 Cap by mouth every day. 30 Cap 1   • gemfibrozil (LOPID) 600 MG Tab  "Take 1 Tab by mouth 2 times a day. 60 Tab 1   • albuterol (VENTOLIN HFA) 108 (90 Base) MCG/ACT Aero Soln inhalation aerosol Inhale 2 Puffs by mouth every 6 hours as needed for Shortness of Breath. 8.5 g 0     No current facility-administered medications on file prior to visit.      Social History     Social History   • Marital status: Single     Spouse name: N/A   • Number of children: N/A   • Years of education: N/A     Occupational History   • Not on file.     Social History Main Topics   • Smoking status: Former Smoker     Years: 25.00   • Smokeless tobacco: Never Used      Comment: 3-4 cigarrette per day   • Alcohol use No   • Drug use: No   • Sexual activity: No     Other Topics Concern   • Not on file     Social History Narrative   • No narrative on file     family history is not on file.      Review of Systems   Constitutional: Positive for malaise/fatigue. Negative for chills and fever.   HENT: Positive for congestion and sore throat.    Eyes: Negative for discharge.   Respiratory: Positive for cough and sputum production. Negative for shortness of breath and wheezing.    Cardiovascular: Negative for chest pain and orthopnea.   Gastrointestinal: Negative for diarrhea and nausea.   Musculoskeletal: Negative for myalgias.   Neurological: Positive for headaches.   Endo/Heme/Allergies: Negative for environmental allergies.          Objective:     /98   Pulse (!) 102   Temp 36.1 °C (97 °F) (Temporal)   Resp 20   Ht 1.575 m (5' 2\")   Wt 81.6 kg (180 lb)   SpO2 96%   BMI 32.92 kg/m²      Physical Exam   Constitutional: She is oriented to person, place, and time. She appears well-developed and well-nourished. No distress.   HENT:   Head: Normocephalic and atraumatic.   Right Ear: External ear and ear canal normal. Tympanic membrane is not injected and not perforated. No middle ear effusion.   Left Ear: External ear and ear canal normal. Tympanic membrane is not injected and not perforated.  No middle " ear effusion.   Nose: Mucosal edema present.   Mouth/Throat: Posterior oropharyngeal erythema present. No oropharyngeal exudate.   Eyes: Conjunctivae are normal. Right eye exhibits no discharge. Left eye exhibits no discharge.   Neck: Normal range of motion. Neck supple.   Cardiovascular: Normal rate, regular rhythm and normal heart sounds.    No murmur heard.  Pulmonary/Chest: Effort normal and breath sounds normal. No respiratory distress.   Musculoskeletal: Normal range of motion.   Normal movement of all 4 extremities.   Lymphadenopathy:     She has no cervical adenopathy.        Right: No supraclavicular adenopathy present.        Left: No supraclavicular adenopathy present.   Neurological: She is alert and oriented to person, place, and time. Gait normal.   Skin: Skin is warm and dry.   Psychiatric: She has a normal mood and affect. Her behavior is normal. Thought content normal.   Nursing note and vitals reviewed.              Assessment/Plan:     1. Acute lower respiratory infection  doxycycline (VIBRAMYCIN) 100 MG Tab    benzonatate (TESSALON) 200 MG capsule    MethylPREDNISolone (MEDROL DOSEPAK) 4 MG Tablet Therapy Pack     Differential diagnosis, natural history, supportive care, and indications for immediate follow-up discussed at length.

## 2018-12-19 DIAGNOSIS — I10 ESSENTIAL HYPERTENSION: ICD-10-CM

## 2018-12-19 DIAGNOSIS — F31.31 BIPOLAR AFFECTIVE DISORDER, CURRENTLY DEPRESSED, MILD (HCC): ICD-10-CM

## 2018-12-19 RX ORDER — CHLORTHALIDONE 25 MG/1
TABLET ORAL
Refills: 3 | OUTPATIENT
Start: 2018-12-19

## 2018-12-19 RX ORDER — FLUOXETINE HYDROCHLORIDE 40 MG/1
CAPSULE ORAL
Refills: 3 | OUTPATIENT
Start: 2018-12-19

## 2019-01-24 ENCOUNTER — TELEPHONE (OUTPATIENT)
Dept: INTERNAL MEDICINE | Facility: MEDICAL CENTER | Age: 49
End: 2019-01-24

## 2019-01-24 NOTE — TELEPHONE ENCOUNTER
1. Caller Name: Margareth                     Call Back Number: [phone]    2. Message: Pt called and l/m. Requesting a RF: Prozac and her B/P medication.  She is changing her PCP to a  that in [place]. Appt is not until Feb.     3. Patient approves office to leave a detailed voicemail/MyChart message: N\A        Called pt and l/m. Notifying pt we have received her message and Dr Mary has refilled her medications and sent them to her pharmacy

## 2019-02-19 ENCOUNTER — OFFICE VISIT (OUTPATIENT)
Dept: MEDICAL GROUP | Facility: PHYSICIAN GROUP | Age: 49
End: 2019-02-19
Payer: COMMERCIAL

## 2019-02-19 VITALS
RESPIRATION RATE: 16 BRPM | HEART RATE: 84 BPM | OXYGEN SATURATION: 95 % | HEIGHT: 63 IN | BODY MASS INDEX: 34.38 KG/M2 | WEIGHT: 194 LBS | SYSTOLIC BLOOD PRESSURE: 118 MMHG | TEMPERATURE: 96.3 F | DIASTOLIC BLOOD PRESSURE: 80 MMHG

## 2019-02-19 DIAGNOSIS — I10 ESSENTIAL HYPERTENSION: ICD-10-CM

## 2019-02-19 DIAGNOSIS — E03.9 HYPOTHYROIDISM, UNSPECIFIED TYPE: ICD-10-CM

## 2019-02-19 DIAGNOSIS — K21.9 GASTROESOPHAGEAL REFLUX DISEASE, ESOPHAGITIS PRESENCE NOT SPECIFIED: ICD-10-CM

## 2019-02-19 DIAGNOSIS — F41.9 ANXIETY: ICD-10-CM

## 2019-02-19 DIAGNOSIS — R05.9 COUGH: ICD-10-CM

## 2019-02-19 DIAGNOSIS — F31.31 BIPOLAR AFFECTIVE DISORDER, CURRENTLY DEPRESSED, MILD (HCC): ICD-10-CM

## 2019-02-19 PROBLEM — Z76.89 ESTABLISHING CARE WITH NEW DOCTOR, ENCOUNTER FOR: Status: RESOLVED | Noted: 2017-10-11 | Resolved: 2019-02-19

## 2019-02-19 LAB
FLUAV+FLUBV AG SPEC QL IA: NORMAL
INT CON NEG: NEGATIVE
INT CON POS: POSITIVE

## 2019-02-19 PROCEDURE — 99214 OFFICE O/P EST MOD 30 MIN: CPT | Performed by: NURSE PRACTITIONER

## 2019-02-19 PROCEDURE — 87804 INFLUENZA ASSAY W/OPTIC: CPT | Performed by: NURSE PRACTITIONER

## 2019-02-19 RX ORDER — CHLORTHALIDONE 25 MG/1
25 TABLET ORAL DAILY
COMMUNITY
End: 2019-05-03 | Stop reason: SDUPTHER

## 2019-02-19 RX ORDER — FLUOXETINE HYDROCHLORIDE 20 MG/1
20 CAPSULE ORAL DAILY
Qty: 90 CAP | Refills: 3 | Status: SHIPPED | OUTPATIENT
Start: 2019-02-19 | End: 2019-04-19

## 2019-02-19 RX ORDER — OMEPRAZOLE 40 MG/1
40 CAPSULE, DELAYED RELEASE ORAL DAILY
Qty: 90 CAP | Refills: 3 | Status: SHIPPED | OUTPATIENT
Start: 2019-02-19 | End: 2019-05-07 | Stop reason: SDUPTHER

## 2019-02-19 RX ORDER — BUPROPION HYDROCHLORIDE 150 MG/1
150 TABLET ORAL EVERY MORNING
Qty: 90 TAB | Refills: 3 | Status: SHIPPED | OUTPATIENT
Start: 2019-02-19 | End: 2020-05-21

## 2019-02-19 NOTE — ASSESSMENT & PLAN NOTE
This is chronic and stable, well controlled on chlorthalidone 25 mg daily.  Blood pressure is 118/80 and she denies symptoms of hypertension.  She is due for labs, these have been ordered.

## 2019-02-19 NOTE — ASSESSMENT & PLAN NOTE
Patient woke up viral upper respiratory infection symptoms including cough, body aches, sore throat and runny nose.  She is concerned for flu as she did not have the flu shot.  She does deny fever but does have a severe headache.  She would like to be tested for flu today, of note rapid influenza test was negative.  I did discuss with her the symptoms are viral in nature and that supportive care is warranted at this time including rest, fluids, over-the-counter cough and cold medications.  However she was advised to follow-up with me if her symptoms worsen or if she develops any other associated symptoms.

## 2019-02-19 NOTE — ASSESSMENT & PLAN NOTE
Patient reports history of bipolar affective disorder, predominantly depression.  She is on fluoxetine 40 mg daily, was also previously on Wellbutrin XL and states she did very well on a combination of the fluoxetine and bupropion but stopped taking the bupropion due to excessive sweating.  She would like to try taking this medication again, this is reasonable since it worked well for her.  We will also increase the fluoxetine to 60 mg daily.  She is not currently followed by psychiatry or psychologist.  She is due for labs, these have been ordered.  She does deny suicidal and homicidal ideations, hallucinations, racing thoughts and flights of ideas.

## 2019-02-19 NOTE — ASSESSMENT & PLAN NOTE
Chronic in nature, stable with daily use of omeprazole 40 mg daily.  She does need refills, this was called in for her.  She was advised to continue avoiding aggravating foods and activities.

## 2019-02-19 NOTE — PROGRESS NOTES
"Chief Complaint   Patient presents with   • Annual Exam     est care-discuss meds   • Pharyngitis     congestion         This is a 48 y.o.female patient that presents today with the following: Establish care, discuss acute and chronic conditions, medication refills    Hypothyroidism  Patient reports history of hypothyroidism, reports her thyroid to be \"calcified\".  She states she has never been on medication for this and does not remember the last time she had labs done.  She does have worsening depression but she attributes this to stopping Wellbutrin as of late, however we will get labs done before her follow-up appointment.    Bipolar affective disorder, currently depressed, mild (CMS-HCC) (HCC)  Patient reports history of bipolar affective disorder, predominantly depression.  She is on fluoxetine 40 mg daily, was also previously on Wellbutrin XL and states she did very well on a combination of the fluoxetine and bupropion but stopped taking the bupropion due to excessive sweating.  She would like to try taking this medication again, this is reasonable since it worked well for her.  We will also increase the fluoxetine to 60 mg daily.  She is not currently followed by psychiatry or psychologist.  She is due for labs, these have been ordered.  She does deny suicidal and homicidal ideations, hallucinations, racing thoughts and flights of ideas.    Essential hypertension  This is chronic and stable, well controlled on chlorthalidone 25 mg daily.  Blood pressure is 118/80 and she denies symptoms of hypertension.  She is due for labs, these have been ordered.    Anxiety  See additional notes under bipolar affective disorder.    Gastroesophageal reflux disease  Chronic in nature, stable with daily use of omeprazole 40 mg daily.  She does need refills, this was called in for her.  She was advised to continue avoiding aggravating foods and activities.    Cough  Patient woke up viral upper respiratory infection symptoms " including cough, body aches, sore throat and runny nose.  She is concerned for flu as she did not have the flu shot.  She does deny fever but does have a severe headache.  She would like to be tested for flu today, of note rapid influenza test was negative.  I did discuss with her the symptoms are viral in nature and that supportive care is warranted at this time including rest, fluids, over-the-counter cough and cold medications.  However she was advised to follow-up with me if her symptoms worsen or if she develops any other associated symptoms.      No visits with results within 1 Month(s) from this visit.   Latest known visit with results is:   Hospital Outpatient Visit on 07/11/2018   Component Date Value   • TSH 07/11/2018 1.950    • Sodium 07/11/2018 135    • Potassium 07/11/2018 3.7    • Chloride 07/11/2018 101    • Co2 07/11/2018 24    • Anion Gap 07/11/2018 10.0    • Glucose 07/11/2018 78    • Bun 07/11/2018 18    • Creatinine 07/11/2018 0.79    • Calcium 07/11/2018 9.5    • AST(SGOT) 07/11/2018 15    • ALT(SGPT) 07/11/2018 13    • Alkaline Phosphatase 07/11/2018 53    • Total Bilirubin 07/11/2018 0.4    • Albumin 07/11/2018 3.9    • Total Protein 07/11/2018 6.6    • Globulin 07/11/2018 2.7    • A-G Ratio 07/11/2018 1.4    • GFR If  07/11/2018 >60    • GFR If Non  Ameri* 07/11/2018 >60          clinical course has been stable    Past Medical History:   Diagnosis Date   • Asthma    • Bipolar 1 disorder, depressed (HCC)    • Hypothyroidism        Past Surgical History:   Procedure Laterality Date   • HYSTERECTOMY, TOTAL ABDOMINAL         No family history on file.    Codeine and Penicillins    Current Outpatient Prescriptions Ordered in [facility]   Medication Sig Dispense Refill   • chlorthalidone (HYGROTON) 25 MG Tab Take 25 mg by mouth every day.     • buPROPion (WELLBUTRIN XL) 150 MG XL tablet Take 1 Tab by mouth every morning. 90 Tab 3   • FLUoxetine (PROZAC) 20 MG Cap Take 1 Cap by  "mouth every day. 90 Cap 3   • omeprazole (PRILOSEC) 40 MG delayed-release capsule Take 1 Cap by mouth every day. 90 Cap 3   • fluoxetine (PROZAC) 40 MG capsule TAKE 1 CAPSULE BY MOUTH EVERY DAY 30 Cap 3     No current Epic-ordered facility-administered medications on file.        Constitutional ROS: No unexpected change in weight, No weakness, No unexplained fevers, sweats, or chills  Pulmonary ROS: Positive per HPI  Cardiovascular ROS: No chest pain, No edema, No palpitations, Positive for hypertension   Gastrointestinal ROS: No abdominal pain, No nausea, vomiting, diarrhea, or constipation, Positive for reflux   Musculoskeletal/Extremities ROS: No clubbing, No peripheral edema, No pain, redness or swelling on the joints  Neurologic ROS: Normal development, No seizures, No weakness  Psych ROS: Positive per HPI  Endocrine ROS: Positive per HPI    Physical exam:  /80 (BP Location: Right arm, Patient Position: Sitting, BP Cuff Size: Adult long)   Pulse 84   Temp (!) 35.7 °C (96.3 °F) (Temporal)   Resp 16   Ht 1.6 m (5' 3\")   Wt 88 kg (194 lb)   SpO2 95%   BMI 34.37 kg/m²   General Appearance: pleasant middle aged female, alert, no distress, obese, well groomed  Skin: Skin color, texture, turgor normal. No rashes or lesions.  Lungs: negative findings: normal respiratory rate and rhythm, lungs clear to auscultation  Heart: negative. RRR without murmur, gallop, or rubs.  No ectopy.  Abdomen: Abdomen soft, non-tender. BS normal. No masses,  No organomegaly  Musculoskeletal: negative findings: no evidence of joint instability, no evidence of muscle atrophy, no deformities present  Neurologic: intact    Medical decision making/discussion: pt to have labs done before she follows up in 6-8 week. Other meds refilled. Will make changes to fluoxetine and have her restart buproprion as mentioned above.     Margareth was seen today for annual exam and pharyngitis.    Diagnoses and all orders for this visit:    Bipolar " affective disorder, currently depressed, mild (HCC)  -     buPROPion (WELLBUTRIN XL) 150 MG XL tablet; Take 1 Tab by mouth every morning.  -     FLUoxetine (PROZAC) 20 MG Cap; Take 1 Cap by mouth every day.    Anxiety  -     buPROPion (WELLBUTRIN XL) 150 MG XL tablet; Take 1 Tab by mouth every morning.  -     FLUoxetine (PROZAC) 20 MG Cap; Take 1 Cap by mouth every day.    Hypothyroidism, unspecified type  -     TSH WITH REFLEX TO FT4; Future    Essential hypertension  -     Lipid Profile; Future  -     Comp Metabolic Panel; Future    Gastroesophageal reflux disease, esophagitis presence not specified  -     omeprazole (PRILOSEC) 40 MG delayed-release capsule; Take 1 Cap by mouth every day.    Cough  -     POCT Influenza A/B          Please note that this dictation was created using voice recognition software. I have made every reasonable attempt to correct obvious errors, but I expect that there are errors of grammar and possibly content that I did not discover before finalizing the note.

## 2019-02-19 NOTE — ASSESSMENT & PLAN NOTE
"Patient reports history of hypothyroidism, reports her thyroid to be \"calcified\".  She states she has never been on medication for this and does not remember the last time she had labs done.  She does have worsening depression but she attributes this to stopping Wellbutrin as of late, however we will get labs done before her follow-up appointment.  "

## 2019-03-13 ENCOUNTER — HOSPITAL ENCOUNTER (OUTPATIENT)
Dept: LAB | Facility: MEDICAL CENTER | Age: 49
End: 2019-03-13
Attending: NURSE PRACTITIONER
Payer: COMMERCIAL

## 2019-03-13 ENCOUNTER — HOSPITAL ENCOUNTER (OUTPATIENT)
Dept: RADIOLOGY | Facility: MEDICAL CENTER | Age: 49
End: 2019-03-13
Attending: NURSE PRACTITIONER
Payer: COMMERCIAL

## 2019-03-13 DIAGNOSIS — Z12.31 SCREENING MAMMOGRAM, ENCOUNTER FOR: ICD-10-CM

## 2019-03-13 DIAGNOSIS — E03.9 HYPOTHYROIDISM, UNSPECIFIED TYPE: ICD-10-CM

## 2019-03-13 LAB — TSH SERPL DL<=0.005 MIU/L-ACNC: 2.66 UIU/ML (ref 0.38–5.33)

## 2019-03-13 PROCEDURE — 36415 COLL VENOUS BLD VENIPUNCTURE: CPT

## 2019-03-13 PROCEDURE — 84443 ASSAY THYROID STIM HORMONE: CPT

## 2019-03-13 PROCEDURE — 77063 BREAST TOMOSYNTHESIS BI: CPT

## 2019-04-08 ENCOUNTER — HOSPITAL ENCOUNTER (OUTPATIENT)
Dept: LAB | Facility: MEDICAL CENTER | Age: 49
End: 2019-04-08
Attending: NURSE PRACTITIONER
Payer: COMMERCIAL

## 2019-04-08 DIAGNOSIS — I10 ESSENTIAL HYPERTENSION: ICD-10-CM

## 2019-04-08 LAB
ALBUMIN SERPL BCP-MCNC: 4.4 G/DL (ref 3.2–4.9)
ALBUMIN/GLOB SERPL: 1.4 G/DL
ALP SERPL-CCNC: 44 U/L (ref 30–99)
ALT SERPL-CCNC: 18 U/L (ref 2–50)
ANION GAP SERPL CALC-SCNC: 9 MMOL/L (ref 0–11.9)
AST SERPL-CCNC: 20 U/L (ref 12–45)
BILIRUB SERPL-MCNC: 0.7 MG/DL (ref 0.1–1.5)
BUN SERPL-MCNC: 17 MG/DL (ref 8–22)
CALCIUM SERPL-MCNC: 9.4 MG/DL (ref 8.5–10.5)
CHLORIDE SERPL-SCNC: 101 MMOL/L (ref 96–112)
CHOLEST SERPL-MCNC: 315 MG/DL (ref 100–199)
CO2 SERPL-SCNC: 28 MMOL/L (ref 20–33)
CREAT SERPL-MCNC: 0.98 MG/DL (ref 0.5–1.4)
FASTING STATUS PATIENT QL REPORTED: NORMAL
GLOBULIN SER CALC-MCNC: 3.2 G/DL (ref 1.9–3.5)
GLUCOSE SERPL-MCNC: 91 MG/DL (ref 65–99)
HDLC SERPL-MCNC: 40 MG/DL
LDLC SERPL CALC-MCNC: ABNORMAL MG/DL
POTASSIUM SERPL-SCNC: 3.7 MMOL/L (ref 3.6–5.5)
PROT SERPL-MCNC: 7.6 G/DL (ref 6–8.2)
SODIUM SERPL-SCNC: 138 MMOL/L (ref 135–145)
TRIGL SERPL-MCNC: 433 MG/DL (ref 0–149)

## 2019-04-08 PROCEDURE — 80061 LIPID PANEL: CPT

## 2019-04-08 PROCEDURE — 80053 COMPREHEN METABOLIC PANEL: CPT

## 2019-04-08 PROCEDURE — 36415 COLL VENOUS BLD VENIPUNCTURE: CPT

## 2019-04-09 ENCOUNTER — OFFICE VISIT (OUTPATIENT)
Dept: MEDICAL GROUP | Facility: PHYSICIAN GROUP | Age: 49
End: 2019-04-09
Payer: COMMERCIAL

## 2019-04-09 VITALS
HEART RATE: 75 BPM | DIASTOLIC BLOOD PRESSURE: 80 MMHG | HEIGHT: 62 IN | TEMPERATURE: 97.4 F | OXYGEN SATURATION: 97 % | RESPIRATION RATE: 20 BRPM | WEIGHT: 194 LBS | SYSTOLIC BLOOD PRESSURE: 122 MMHG | BODY MASS INDEX: 35.7 KG/M2

## 2019-04-09 DIAGNOSIS — F41.9 ANXIETY: ICD-10-CM

## 2019-04-09 DIAGNOSIS — F31.31 BIPOLAR AFFECTIVE DISORDER, CURRENTLY DEPRESSED, MILD (HCC): ICD-10-CM

## 2019-04-09 DIAGNOSIS — E05.90 HYPERTHYROIDISM: ICD-10-CM

## 2019-04-09 DIAGNOSIS — E78.1 HYPERTRIGLYCERIDEMIA: ICD-10-CM

## 2019-04-09 DIAGNOSIS — L98.9 SKIN LESION: ICD-10-CM

## 2019-04-09 DIAGNOSIS — M20.12 HALLUX VALGUS OF LEFT FOOT: ICD-10-CM

## 2019-04-09 PROCEDURE — 99214 OFFICE O/P EST MOD 30 MIN: CPT | Performed by: NURSE PRACTITIONER

## 2019-04-09 RX ORDER — AMOXICILLIN 500 MG/1
CAPSULE ORAL
Refills: 0 | COMMUNITY
Start: 2019-04-02 | End: 2019-04-19

## 2019-04-09 RX ORDER — SERTRALINE HYDROCHLORIDE 25 MG/1
50 TABLET, FILM COATED ORAL DAILY
Qty: 180 TAB | Refills: 1 | Status: SHIPPED | OUTPATIENT
Start: 2019-04-09 | End: 2019-05-07 | Stop reason: SDUPTHER

## 2019-04-09 RX ORDER — ROSUVASTATIN CALCIUM 20 MG/1
20 TABLET, COATED ORAL EVERY EVENING
Qty: 30 TAB | Refills: 11 | Status: SHIPPED | OUTPATIENT
Start: 2019-04-09 | End: 2019-05-07 | Stop reason: SDUPTHER

## 2019-04-09 RX ORDER — NAPROXEN 500 MG/1
TABLET ORAL
Refills: 0 | COMMUNITY
Start: 2019-04-02 | End: 2019-04-19

## 2019-04-09 NOTE — PROGRESS NOTES
Chief Complaint   Patient presents with   • Follow-Up     labs, depression   • Immunizations     Flu, tdap, pneumo         This is a 48 y.o.female patient that presents today with the following: Follow-up visit, discuss labs, other acute and chronic conditions    Bipolar affective disorder, currently depressed, mild (CMS-HCC) (HCC)  This is a chronic and stable condition, usually well controlled on medications including fluoxetine and bupropion.  At her last visit she had not been taking the bupropion and felt that her symptoms have gotten worse and thus we restarted her on this medication and increased her fluoxetine to 60 mg daily.  She still does not feel that her symptoms are improved.  While hesitant she does agree to see psychiatry for medication evaluation, and interim we will have her wean off fluoxetine and start sertraline.  She will start at 25 mg daily for 1-2 weeks then increase this to 50 mg daily.  We will have her stay on current dose of bupropion.    Anxiety  See additional notes    Hyperthyroidism  Patient reports history of hyperthyroidism, not currently on medication for this.  Her most recent thyroid labs were well within normal limits.    Hypertriglyceridemia  Patient has notable increase in her triglycerides of 433, LDL unable to be calculated.  She does report a family history of familial hypercholesterolemia.  She is not currently on statin.  The 10-year ASCVD risk score (Aberdeen DC Jr., et al., 2013) is: 2.7%    Values used to calculate the score:      Age: 48 years      Sex: Female      Is Non- : No      Diabetic: No      Tobacco smoker: No      Systolic Blood Pressure: 122 mmHg      Is BP treated: No      HDL Cholesterol: 40 mg/dL      Total Cholesterol: 315 mg/dL  In the setting of her family history as well as her increased triglyceride she does agree to start statin.  She will start rosuvastatin 20 mg daily.  Discussed with her the risks, benefits and side effects  of medication and we will plan on rechecking lipids again in 3 months.    Hallux valgus of left foot  Patient has symptoms very consistent with hallux valgus of the left foot.  She is having increased pain and discomfort, she is unable to wear her normal shoes.  She would like a referral to podiatry for further evaluation and possible treatment.    Skin lesion  Patient has less than centimeter raised firm skin lesion on the posterior aspect of her right upper extremity that she reports is gotten bigger over the past several months.  It is nontender, does not drain.  Will refer to dermatology for further evaluation.        Hospital Outpatient Visit on 04/08/2019   Component Date Value   • Cholesterol,Tot 04/08/2019 315*   • Triglycerides 04/08/2019 433*   • HDL 04/08/2019 40    • LDL 04/08/2019 see below    • Sodium 04/08/2019 138    • Potassium 04/08/2019 3.7    • Chloride 04/08/2019 101    • Co2 04/08/2019 28    • Anion Gap 04/08/2019 9.0    • Glucose 04/08/2019 91    • Bun 04/08/2019 17    • Creatinine 04/08/2019 0.98    • Calcium 04/08/2019 9.4    • AST(SGOT) 04/08/2019 20    • ALT(SGPT) 04/08/2019 18    • Alkaline Phosphatase 04/08/2019 44    • Total Bilirubin 04/08/2019 0.7    • Albumin 04/08/2019 4.4    • Total Protein 04/08/2019 7.6    • Globulin 04/08/2019 3.2    • A-G Ratio 04/08/2019 1.4    • Fasting Status 04/08/2019 Fasting    • GFR If  04/08/2019 >60    • GFR If Non  Ameri* 04/08/2019 >60    Hospital Outpatient Visit on 03/13/2019   Component Date Value   • TSH 03/13/2019 2.660          clinical course has been stable    Past Medical History:   Diagnosis Date   • Asthma    • Bipolar 1 disorder, depressed (HCC)    • Hypothyroidism        Past Surgical History:   Procedure Laterality Date   • HYSTERECTOMY, TOTAL ABDOMINAL         No family history on file.    Codeine    Current Outpatient Prescriptions Ordered in [facility]   Medication Sig Dispense Refill   • amoxicillin (AMOXIL) 500 MG  "Cap TK 1 C PO TID  0   • naproxen (NAPROSYN) 500 MG Tab TK 1 T PO Q 12 H PRN P  0   • sertraline (ZOLOFT) 25 MG tablet Take 2 Tabs by mouth every day. 180 Tab 1   • rosuvastatin (CRESTOR) 20 MG Tab Take 1 Tab by mouth every evening. 30 Tab 11   • chlorthalidone (HYGROTON) 25 MG Tab Take 25 mg by mouth every day.     • buPROPion (WELLBUTRIN XL) 150 MG XL tablet Take 1 Tab by mouth every morning. 90 Tab 3   • FLUoxetine (PROZAC) 20 MG Cap Take 1 Cap by mouth every day. 90 Cap 3   • omeprazole (PRILOSEC) 40 MG delayed-release capsule Take 1 Cap by mouth every day. 90 Cap 3   • fluoxetine (PROZAC) 40 MG capsule TAKE 1 CAPSULE BY MOUTH EVERY DAY 30 Cap 3     No current Epic-ordered facility-administered medications on file.        Constitutional ROS: No unexpected change in weight, No fatigue, No unexplained fevers, sweats, or chills  Pulmonary ROS: No chronic cough, sputum, or hemoptysis, No shortness of breath, No recent change in breathing  Cardiovascular ROS: No chest pain, No edema, No palpitations.  Positive for hyperlipidemia  Musculoskeletal/Extremities ROS: positive per HPI  Neurologic ROS: Normal development, No seizures, No weakness  Skin ROS: positive per HPI  Psychiatric ROS: Positive per HPI  Endocrine ROS: Positive per HPI    Physical exam:  /80 (BP Location: Right arm, Patient Position: Sitting, BP Cuff Size: Adult)   Pulse 75   Temp 36.3 °C (97.4 °F) (Temporal)   Resp 20   Ht 1.575 m (5' 2\")   Wt 88 kg (194 lb)   SpO2 97%   BMI 35.48 kg/m²   General Appearance: Very pleasant middle-aged female, alert, no distress, obese, well-groomed  Skin: positives: Less than 1 cm raised firm lesion on posterior aspect of right upper extremity  Lungs: negative findings: normal respiratory rate and rhythm, lungs clear to auscultation  Heart: negative. RRR without murmur, gallop, or rubs.  No ectopy.  Abdomen: Abdomen soft, non-tender. BS normal. No masses,  No organomegaly  Musculoskeletal: positive " findings: halux valgus on left  Neurologic: intact, CN II through XII grossly intact    Medical decision making/discussion: We will make changes to her medications for bipolar affective disorder as well as anxiety.  She will wean off fluoxetine over the next 2-3 weeks but will start sertraline 25 mg daily for 1-2 weeks then increase to 50 mg.  She will continue on the bupropion as previously prescribed.  She does agree to referral to psychiatry.  Patient also agrees to starting statin since she has significantly elevated triglycerides and reports a family history of familial hypercholesterolemia.  She is going to follow-up with me in 3 months with labs done before visit.  Referrals have been placed to podiatry as well as dermatology per her request.    Margareth was seen today for follow-up and immunizations.    Diagnoses and all orders for this visit:    Anxiety  -     REFERRAL TO PSYCHIATRY  -     sertraline (ZOLOFT) 25 MG tablet; Take 2 Tabs by mouth every day.    Bipolar affective disorder, currently depressed, mild (HCC)  -     REFERRAL TO PSYCHIATRY  -     sertraline (ZOLOFT) 25 MG tablet; Take 2 Tabs by mouth every day.    Hyperthyroidism    Hypertriglyceridemia  -     rosuvastatin (CRESTOR) 20 MG Tab; Take 1 Tab by mouth every evening.    Hallux valgus of left foot  -     REFERRAL TO PODIATRY    Skin lesion  -     REFERRAL TO DERMATOLOGY        Return in about 3 months (around 7/9/2019) for Follow-up, Discuss Labs.        Please note that this dictation was created using voice recognition software. I have made every reasonable attempt to correct obvious errors, but I expect that there are errors of grammar and possibly content that I did not discover before finalizing the note.

## 2019-04-09 NOTE — PATIENT INSTRUCTIONS
Wean off the prozac, take 20 mg every day for a week, then every other day for a week or two then stop    Can go ahead and start sertraline 25 mg daily for 1-2 weeks, then increase to 50 mg daily    Referral to psychiatry, derm and podiatry    Will have you start rosuvastatin 20 mg daily, follow up with me in 3 months with labs

## 2019-04-10 PROBLEM — M20.12 HALLUX VALGUS OF LEFT FOOT: Status: ACTIVE | Noted: 2019-04-10

## 2019-04-10 PROBLEM — E78.1 HYPERTRIGLYCERIDEMIA: Status: ACTIVE | Noted: 2019-04-10

## 2019-04-10 PROBLEM — L98.9 SKIN LESION: Status: ACTIVE | Noted: 2019-04-10

## 2019-04-10 NOTE — ASSESSMENT & PLAN NOTE
Patient has symptoms very consistent with hallux valgus of the left foot.  She is having increased pain and discomfort, she is unable to wear her normal shoes.  She would like a referral to podiatry for further evaluation and possible treatment.

## 2019-04-10 NOTE — ASSESSMENT & PLAN NOTE
Patient has less than centimeter raised firm skin lesion on the posterior aspect of her right upper extremity that she reports is gotten bigger over the past several months.  It is nontender, does not drain.  Will refer to dermatology for further evaluation.

## 2019-04-10 NOTE — ASSESSMENT & PLAN NOTE
This is a chronic and stable condition, usually well controlled on medications including fluoxetine and bupropion.  At her last visit she had not been taking the bupropion and felt that her symptoms have gotten worse and thus we restarted her on this medication and increased her fluoxetine to 60 mg daily.  She still does not feel that her symptoms are improved.  While hesitant she does agree to see psychiatry for medication evaluation, and interim we will have her wean off fluoxetine and start sertraline.  She will start at 25 mg daily for 1-2 weeks then increase this to 50 mg daily.  We will have her stay on current dose of bupropion.

## 2019-04-10 NOTE — ASSESSMENT & PLAN NOTE
Patient has notable increase in her triglycerides of 433, LDL unable to be calculated.  She does report a family history of familial hypercholesterolemia.  She is not currently on statin.  The 10-year ASCVD risk score (Steph ROE Jr., et al., 2013) is: 2.7%    Values used to calculate the score:      Age: 48 years      Sex: Female      Is Non- : No      Diabetic: No      Tobacco smoker: No      Systolic Blood Pressure: 122 mmHg      Is BP treated: No      HDL Cholesterol: 40 mg/dL      Total Cholesterol: 315 mg/dL  In the setting of her family history as well as her increased triglyceride she does agree to start statin.  She will start rosuvastatin 20 mg daily.  Discussed with her the risks, benefits and side effects of medication and we will plan on rechecking lipids again in 3 months.

## 2019-04-10 NOTE — ASSESSMENT & PLAN NOTE
Patient reports history of hyperthyroidism, not currently on medication for this.  Her most recent thyroid labs were well within normal limits.

## 2019-04-19 ENCOUNTER — OFFICE VISIT (OUTPATIENT)
Dept: URGENT CARE | Facility: PHYSICIAN GROUP | Age: 49
End: 2019-04-19
Payer: COMMERCIAL

## 2019-04-19 VITALS
WEIGHT: 200 LBS | BODY MASS INDEX: 36.8 KG/M2 | SYSTOLIC BLOOD PRESSURE: 142 MMHG | HEIGHT: 62 IN | TEMPERATURE: 98 F | HEART RATE: 96 BPM | DIASTOLIC BLOOD PRESSURE: 88 MMHG | RESPIRATION RATE: 20 BRPM | OXYGEN SATURATION: 95 %

## 2019-04-19 DIAGNOSIS — J45.901 EXACERBATION OF ASTHMA, UNSPECIFIED ASTHMA SEVERITY, UNSPECIFIED WHETHER PERSISTENT: ICD-10-CM

## 2019-04-19 DIAGNOSIS — R05.9 COUGH: ICD-10-CM

## 2019-04-19 DIAGNOSIS — J20.9 ACUTE BRONCHITIS, UNSPECIFIED ORGANISM: ICD-10-CM

## 2019-04-19 DIAGNOSIS — J22 ACUTE LOWER RESPIRATORY INFECTION: ICD-10-CM

## 2019-04-19 PROCEDURE — 99214 OFFICE O/P EST MOD 30 MIN: CPT | Performed by: FAMILY MEDICINE

## 2019-04-19 RX ORDER — DOXYCYCLINE HYCLATE 100 MG
100 TABLET ORAL 2 TIMES DAILY
Qty: 14 TAB | Refills: 0 | Status: SHIPPED | OUTPATIENT
Start: 2019-04-19 | End: 2019-04-26

## 2019-04-19 RX ORDER — IPRATROPIUM BROMIDE AND ALBUTEROL SULFATE 2.5; .5 MG/3ML; MG/3ML
3 SOLUTION RESPIRATORY (INHALATION) ONCE
Status: COMPLETED | OUTPATIENT
Start: 2019-04-19 | End: 2019-04-19

## 2019-04-19 RX ORDER — BENZONATATE 200 MG/1
CAPSULE ORAL
Qty: 30 CAP | Refills: 0 | Status: SHIPPED | OUTPATIENT
Start: 2019-04-19 | End: 2020-08-14

## 2019-04-19 RX ORDER — METHYLPREDNISOLONE 4 MG/1
TABLET ORAL
Qty: 21 TAB | Refills: 0 | Status: SHIPPED | OUTPATIENT
Start: 2019-04-19 | End: 2020-08-14

## 2019-04-19 RX ORDER — ALBUTEROL SULFATE 2.5 MG/3ML
2.5 SOLUTION RESPIRATORY (INHALATION) EVERY 4 HOURS PRN
Qty: 30 BULLET | Refills: 3 | Status: SHIPPED | OUTPATIENT
Start: 2019-04-19 | End: 2019-05-03 | Stop reason: SDUPTHER

## 2019-04-19 RX ADMIN — IPRATROPIUM BROMIDE AND ALBUTEROL SULFATE 3 ML: 2.5; .5 SOLUTION RESPIRATORY (INHALATION) at 17:24

## 2019-04-19 NOTE — PROGRESS NOTES
Chief Complaint:    Chief Complaint   Patient presents with   • Cough     SOB, vomiting, back hurts when coughing, chest congestion x3 days        History of Present Illness:    This is a new problem. Symptoms x 3 days; has a lot of coughing productive of purulent mucus and shortness of breath. She is coughing so hard that sometimes she vomits due to cough. Also back hurts when she coughs. Doxycycline, Medrol Dose Reed, and Tessalon 200 mg worked/tolerated for similar symptoms 12/9/18. She has Asthma and has MDI to use which she has been doing. Nebulizer treatment has helped well in past, would like treatment today and Rx for neb machine and neb solution.      Review of Systems:    Constitutional: Negative for fever, chills, and diaphoresis.   Eyes: Negative for change in vision, photophobia, pain, redness, and discharge.  ENT: Negative for ear pain, ear discharge, hearing loss, tinnitus, nasal congestion, nosebleeds, and sore throat.    Respiratory: See HPI.     Cardiovascular: Negative for chest pain, palpitations, orthopnea, claudication, leg swelling, and PND.   Gastrointestinal: See HPI.   Genitourinary: Negative for dysuria, urinary urgency, urinary frequency, hematuria, and flank pain.   Musculoskeletal: See HPI.   Skin: Negative for rash and itching.   Neurological: Negative for dizziness, tingling, tremors, sensory change, speech change, focal weakness, seizures, loss of consciousness, and headaches.   Endo: Negative for polydipsia.   Heme: Does not bruise/bleed easily.   Psychiatric/Behavioral: No new symptoms.      Past Medical History:    Past Medical History:   Diagnosis Date   • Asthma    • Bipolar 1 disorder, depressed (HCC)    • Hypothyroidism      Past Surgical History:    Past Surgical History:   Procedure Laterality Date   • HYSTERECTOMY, TOTAL ABDOMINAL       Social History:    Social History     Social History   • Marital status: Single     Spouse name: N/A   • Number of children: N/A   • Years  "of education: N/A     Occupational History   • Not on file.     Social History Main Topics   • Smoking status: Former Smoker     Years: 25.00   • Smokeless tobacco: Never Used      Comment: 3-4 cigarrette per day   • Alcohol use No   • Drug use: No   • Sexual activity: No     Other Topics Concern   • Not on file     Social History Narrative   • No narrative on file     Family History:    History reviewed. No pertinent family history.    Medications:    Current Outpatient Prescriptions on File Prior to Visit   Medication Sig Dispense Refill   • sertraline (ZOLOFT) 25 MG tablet Take 2 Tabs by mouth every day. 180 Tab 1   • rosuvastatin (CRESTOR) 20 MG Tab Take 1 Tab by mouth every evening. 30 Tab 11   • buPROPion (WELLBUTRIN XL) 150 MG XL tablet Take 1 Tab by mouth every morning. 90 Tab 3   • omeprazole (PRILOSEC) 40 MG delayed-release capsule Take 1 Cap by mouth every day. 90 Cap 3   • chlorthalidone (HYGROTON) 25 MG Tab Take 25 mg by mouth every day.       No current facility-administered medications on file prior to visit.      Allergies:    Allergies   Allergen Reactions   • Codeine Swelling     Throat edema       Vitals:    Vitals:    04/19/19 1428   BP: 142/88   Pulse: 96   Resp: 20   Temp: 36.7 °C (98 °F)   SpO2: 95%   Weight: 90.7 kg (200 lb)   Height: 1.575 m (5' 2\")       Physical Exam:    Constitutional: Vital signs reviewed. Appears well-developed and well-nourished. Frequent coughing and audible wheezing.  Eyes: Sclera white, conjunctivae clear.   ENT: External ears normal. Hearing normal. Nasal mucosa pink. Lips/teeth are normal. Oral mucosa pink and moist. Posterior pharynx: WNL.  Neck: Neck supple.   Cardiovascular: Regular rate and rhythm. No murmur.  Pulmonary/Chest: Respirations non-labored. Severe diffuse rales and rhonchi bilaterally.  Lymph: Cervical nodes without tenderness or enlargement.  Musculoskeletal: Normal gait. Normal range of motion. No muscular atrophy or weakness.  Neurological: " Alert and oriented to person, place, and time. Muscle tone normal. Coordination normal.   Skin: No rashes or lesions. Warm, dry, normal turgor.  Psychiatric: Normal mood and affect. Behavior is normal. Judgment and thought content normal.       Assessment / Plan:    1. Acute lower respiratory infection  - doxycycline (VIBRAMYCIN) 100 MG Tab; Take 1 Tab by mouth 2 times a day for 7 days.  Dispense: 14 Tab; Refill: 0    2. Acute bronchitis, unspecified organism  - MethylPREDNISolone (MEDROL DOSEPAK) 4 MG Tablet Therapy Pack; TAKE AS DIRECTED ON PACKAGE.  Dispense: 21 Tab; Refill: 0    3. Cough  - benzonatate (TESSALON) 200 MG capsule; 1 CAP UP TO 3 TIMES A DAY ONLY IF NEEDED FOR COUGH.  Dispense: 30 Cap; Refill: 0    4. Exacerbation of asthma, unspecified asthma severity, unspecified whether persistent  - ipratropium-albuterol (DUONEB) nebulizer solution; 3 mL by Nebulization route Once.  - Respiratory Therapy Supplies (NEBULIZER COMPRESSOR) Kit; Dispense nebulizer machine and all associated supplies.  Dispense: 1 Kit; Refill: 0  - albuterol (PROVENTIL) 2.5mg/3ml Nebu Soln solution for nebulization; 3 mL by Nebulization route every four hours as needed for Shortness of Breath.  Dispense: 30 Bullet; Refill: 3      Work note given - excuse for 4/18 thru and including 4/20/19.     Discussed with her DDX, management options, and risks, benefits, and alternatives to treatment plan agreed upon.    Agreeable to medications given and prescribed.    Discussed expected course of duration, time for improvement, and to seek follow-up in Emergency Room, urgent care, or with PCP if getting worse at any time or not improving within expected time frame.

## 2019-04-19 NOTE — LETTER
April 19, 2019         Patient: Margareth SMITH   YOB: 1970   Date of Visit: 4/19/2019           To Whom it May Concern:    Margareth SMITH was seen in my clinic on 4/19/2019.     Please excuse from work for 4/18 thru and including 4/20/19 due to medical condition.    If you have any questions or concerns, please don't hesitate to call.        Sincerely,           Michael Onofre M.D.  Electronically Signed

## 2019-05-07 DIAGNOSIS — K21.9 GASTROESOPHAGEAL REFLUX DISEASE, ESOPHAGITIS PRESENCE NOT SPECIFIED: ICD-10-CM

## 2019-05-07 DIAGNOSIS — E78.1 HYPERTRIGLYCERIDEMIA: ICD-10-CM

## 2019-05-07 DIAGNOSIS — J45.901 EXACERBATION OF ASTHMA, UNSPECIFIED ASTHMA SEVERITY, UNSPECIFIED WHETHER PERSISTENT: ICD-10-CM

## 2019-05-07 DIAGNOSIS — F41.9 ANXIETY: ICD-10-CM

## 2019-05-07 DIAGNOSIS — J45.21 MILD INTERMITTENT ASTHMA WITH EXACERBATION: ICD-10-CM

## 2019-05-07 DIAGNOSIS — F31.31 BIPOLAR AFFECTIVE DISORDER, CURRENTLY DEPRESSED, MILD (HCC): ICD-10-CM

## 2019-05-07 RX ORDER — ALBUTEROL SULFATE 2.5 MG/3ML
1 SOLUTION RESPIRATORY (INHALATION) EVERY 4 HOURS PRN
Qty: 50 BULLET | Refills: 0 | Status: SHIPPED | OUTPATIENT
Start: 2019-05-07 | End: 2021-04-28

## 2019-05-07 RX ORDER — OMEPRAZOLE 40 MG/1
40 CAPSULE, DELAYED RELEASE ORAL DAILY
Qty: 90 CAP | Refills: 0 | Status: SHIPPED | OUTPATIENT
Start: 2019-05-07 | End: 2021-12-29 | Stop reason: SDUPTHER

## 2019-05-07 RX ORDER — SERTRALINE HYDROCHLORIDE 25 MG/1
50 TABLET, FILM COATED ORAL DAILY
Qty: 180 TAB | Refills: 0 | Status: SHIPPED | OUTPATIENT
Start: 2019-05-07 | End: 2019-11-19

## 2019-05-07 RX ORDER — ALBUTEROL SULFATE 90 UG/1
2 AEROSOL, METERED RESPIRATORY (INHALATION) EVERY 6 HOURS PRN
Qty: 8.5 G | Refills: 2 | Status: SHIPPED | OUTPATIENT
Start: 2019-05-07 | End: 2022-05-19 | Stop reason: SDUPTHER

## 2019-05-07 RX ORDER — ROSUVASTATIN CALCIUM 20 MG/1
20 TABLET, COATED ORAL EVERY EVENING
Qty: 90 TAB | Refills: 0 | Status: SHIPPED | OUTPATIENT
Start: 2019-05-07 | End: 2020-05-21

## 2019-05-07 NOTE — TELEPHONE ENCOUNTER
**NEW PHARMACY REQUESTING**      [facility]      Was the patient seen in the last year in this department? Yes    Does patient have an active prescription for medications requested? Yes    Received Request Via: Pharmacy      Pt met protocol?: Yes, last ov 4/19  Last labs 4/19    Lab Results  Component Value Date/Time   CHOLSTRLTOT 315 (H) 04/08/2019 1019   TRIGLYCERIDE 433 (H) 04/08/2019 1019   HDL 40 04/08/2019 1019   LDL see below 04/08/2019 1019

## 2019-11-17 DIAGNOSIS — F41.9 ANXIETY: ICD-10-CM

## 2019-11-17 DIAGNOSIS — F31.31 BIPOLAR AFFECTIVE DISORDER, CURRENTLY DEPRESSED, MILD (HCC): ICD-10-CM

## 2019-11-18 NOTE — TELEPHONE ENCOUNTER
Was the patient seen in the last year in this department? Yes    Does patient have an active prescription for medications requested? No     Received Request Via: Pharmacy    Pt met protocol?: Yes     Last OV 04/09/19

## 2019-11-19 RX ORDER — SERTRALINE HYDROCHLORIDE 25 MG/1
TABLET, FILM COATED ORAL
Qty: 180 TAB | Refills: 0 | Status: SHIPPED | OUTPATIENT
Start: 2019-11-19 | End: 2020-08-14

## 2020-03-22 ENCOUNTER — APPOINTMENT (OUTPATIENT)
Dept: URGENT CARE | Facility: PHYSICIAN GROUP | Age: 50
End: 2020-03-22

## 2020-05-20 DIAGNOSIS — F31.31 BIPOLAR AFFECTIVE DISORDER, CURRENTLY DEPRESSED, MILD (HCC): ICD-10-CM

## 2020-05-20 DIAGNOSIS — E78.1 HYPERTRIGLYCERIDEMIA: ICD-10-CM

## 2020-05-20 DIAGNOSIS — F41.9 ANXIETY: ICD-10-CM

## 2020-05-20 DIAGNOSIS — E05.90 HYPERTHYROIDISM: ICD-10-CM

## 2020-05-21 RX ORDER — ROSUVASTATIN CALCIUM 20 MG/1
TABLET, COATED ORAL
Qty: 90 TAB | Refills: 0 | OUTPATIENT
Start: 2020-05-21 | End: 2020-08-14

## 2020-05-21 RX ORDER — BUPROPION HYDROCHLORIDE 150 MG/1
TABLET ORAL
Qty: 90 TAB | Refills: 0 | Status: SHIPPED | OUTPATIENT
Start: 2020-05-21 | End: 2020-05-21 | Stop reason: SDUPTHER

## 2020-05-21 RX ORDER — BUPROPION HYDROCHLORIDE 150 MG/1
TABLET ORAL
Qty: 90 TAB | Refills: 0 | Status: SHIPPED | OUTPATIENT
Start: 2020-05-21 | End: 2020-05-21

## 2020-05-21 RX ORDER — BUPROPION HYDROCHLORIDE 150 MG/1
TABLET ORAL
Qty: 90 TAB | Refills: 0 | OUTPATIENT
Start: 2020-05-21 | End: 2020-08-14

## 2020-05-21 RX ORDER — ROSUVASTATIN CALCIUM 20 MG/1
TABLET, COATED ORAL
Qty: 90 TAB | Refills: 0 | Status: SHIPPED | OUTPATIENT
Start: 2020-05-21 | End: 2020-05-21

## 2020-05-21 RX ORDER — ROSUVASTATIN CALCIUM 20 MG/1
TABLET, COATED ORAL
Qty: 90 TAB | Refills: 0 | Status: SHIPPED | OUTPATIENT
Start: 2020-05-21 | End: 2020-05-21 | Stop reason: SDUPTHER

## 2020-05-26 ENCOUNTER — HOSPITAL ENCOUNTER (OUTPATIENT)
Dept: LAB | Facility: MEDICAL CENTER | Age: 50
End: 2020-05-26
Attending: FAMILY MEDICINE
Payer: COMMERCIAL

## 2020-05-26 LAB — TSH SERPL DL<=0.005 MIU/L-ACNC: 2.15 UIU/ML (ref 0.38–5.33)

## 2020-05-26 PROCEDURE — 36415 COLL VENOUS BLD VENIPUNCTURE: CPT

## 2020-05-26 PROCEDURE — 84443 ASSAY THYROID STIM HORMONE: CPT

## 2020-08-05 ENCOUNTER — HOSPITAL ENCOUNTER (OUTPATIENT)
Dept: LAB | Facility: MEDICAL CENTER | Age: 50
End: 2020-08-05
Attending: NURSE PRACTITIONER
Payer: COMMERCIAL

## 2020-08-05 ENCOUNTER — HOSPITAL ENCOUNTER (OUTPATIENT)
Dept: LAB | Facility: MEDICAL CENTER | Age: 50
End: 2020-08-05
Attending: FAMILY MEDICINE
Payer: COMMERCIAL

## 2020-08-05 DIAGNOSIS — E05.90 HYPERTHYROIDISM: ICD-10-CM

## 2020-08-05 DIAGNOSIS — E78.1 HYPERTRIGLYCERIDEMIA: ICD-10-CM

## 2020-08-05 LAB
ALBUMIN SERPL BCP-MCNC: 4.5 G/DL (ref 3.2–4.9)
ALBUMIN/GLOB SERPL: 1.6 G/DL
ALP SERPL-CCNC: 54 U/L (ref 30–99)
ALT SERPL-CCNC: 18 U/L (ref 2–50)
ANION GAP SERPL CALC-SCNC: 15 MMOL/L (ref 7–16)
AST SERPL-CCNC: 19 U/L (ref 12–45)
BILIRUB SERPL-MCNC: 0.4 MG/DL (ref 0.1–1.5)
BUN SERPL-MCNC: 18 MG/DL (ref 8–22)
CALCIUM SERPL-MCNC: 9.3 MG/DL (ref 8.5–10.5)
CHLORIDE SERPL-SCNC: 97 MMOL/L (ref 96–112)
CHOLEST SERPL-MCNC: 298 MG/DL (ref 100–199)
CO2 SERPL-SCNC: 23 MMOL/L (ref 20–33)
CREAT SERPL-MCNC: 0.93 MG/DL (ref 0.5–1.4)
ERYTHROCYTE [DISTWIDTH] IN BLOOD BY AUTOMATED COUNT: 42.1 FL (ref 35.9–50)
EST. AVERAGE GLUCOSE BLD GHB EST-MCNC: 120 MG/DL
FASTING STATUS PATIENT QL REPORTED: NORMAL
GLOBULIN SER CALC-MCNC: 2.8 G/DL (ref 1.9–3.5)
GLUCOSE SERPL-MCNC: 119 MG/DL (ref 65–99)
HBA1C MFR BLD: 5.8 % (ref 0–5.6)
HCT VFR BLD AUTO: 51.6 % (ref 37–47)
HDLC SERPL-MCNC: 37 MG/DL
HGB BLD-MCNC: 17.6 G/DL (ref 12–16)
LDLC SERPL CALC-MCNC: ABNORMAL MG/DL
MCH RBC QN AUTO: 32.4 PG (ref 27–33)
MCHC RBC AUTO-ENTMCNC: 34.1 G/DL (ref 33.6–35)
MCV RBC AUTO: 94.9 FL (ref 81.4–97.8)
PLATELET # BLD AUTO: 423 K/UL (ref 164–446)
PMV BLD AUTO: 10.2 FL (ref 9–12.9)
POTASSIUM SERPL-SCNC: 3.5 MMOL/L (ref 3.6–5.5)
PROT SERPL-MCNC: 7.3 G/DL (ref 6–8.2)
RBC # BLD AUTO: 5.44 M/UL (ref 4.2–5.4)
SODIUM SERPL-SCNC: 135 MMOL/L (ref 135–145)
TRIGL SERPL-MCNC: 409 MG/DL (ref 0–149)
TSH SERPL DL<=0.005 MIU/L-ACNC: 2.53 UIU/ML (ref 0.38–5.33)
WBC # BLD AUTO: 8.3 K/UL (ref 4.8–10.8)

## 2020-08-05 PROCEDURE — 36415 COLL VENOUS BLD VENIPUNCTURE: CPT

## 2020-08-05 PROCEDURE — 83036 HEMOGLOBIN GLYCOSYLATED A1C: CPT

## 2020-08-05 PROCEDURE — 80053 COMPREHEN METABOLIC PANEL: CPT

## 2020-08-05 PROCEDURE — 85027 COMPLETE CBC AUTOMATED: CPT

## 2020-08-05 PROCEDURE — 84443 ASSAY THYROID STIM HORMONE: CPT

## 2020-08-05 PROCEDURE — 80061 LIPID PANEL: CPT

## 2020-08-14 ENCOUNTER — OFFICE VISIT (OUTPATIENT)
Dept: URGENT CARE | Facility: PHYSICIAN GROUP | Age: 50
End: 2020-08-14
Payer: COMMERCIAL

## 2020-08-14 ENCOUNTER — HOSPITAL ENCOUNTER (OUTPATIENT)
Facility: MEDICAL CENTER | Age: 50
End: 2020-08-14
Attending: NURSE PRACTITIONER
Payer: COMMERCIAL

## 2020-08-14 VITALS
HEART RATE: 105 BPM | DIASTOLIC BLOOD PRESSURE: 88 MMHG | OXYGEN SATURATION: 95 % | SYSTOLIC BLOOD PRESSURE: 134 MMHG | BODY MASS INDEX: 37.47 KG/M2 | RESPIRATION RATE: 16 BRPM | TEMPERATURE: 97.8 F | HEIGHT: 62 IN | WEIGHT: 203.6 LBS

## 2020-08-14 DIAGNOSIS — J45.21 MILD INTERMITTENT ASTHMA WITH EXACERBATION: ICD-10-CM

## 2020-08-14 DIAGNOSIS — R50.9 FEVER, UNSPECIFIED FEVER CAUSE: ICD-10-CM

## 2020-08-14 DIAGNOSIS — Z20.822 EXPOSURE TO COVID-19 VIRUS: ICD-10-CM

## 2020-08-14 DIAGNOSIS — R52 GENERALIZED BODY ACHES: ICD-10-CM

## 2020-08-14 DIAGNOSIS — J02.9 PHARYNGITIS, UNSPECIFIED ETIOLOGY: ICD-10-CM

## 2020-08-14 LAB
FLUAV+FLUBV AG SPEC QL IA: NEGATIVE
INT CON NEG: NORMAL
INT CON NEG: NORMAL
INT CON POS: NORMAL
INT CON POS: NORMAL
S PYO AG THROAT QL: NEGATIVE

## 2020-08-14 PROCEDURE — U0003 INFECTIOUS AGENT DETECTION BY NUCLEIC ACID (DNA OR RNA); SEVERE ACUTE RESPIRATORY SYNDROME CORONAVIRUS 2 (SARS-COV-2) (CORONAVIRUS DISEASE [COVID-19]), AMPLIFIED PROBE TECHNIQUE, MAKING USE OF HIGH THROUGHPUT TECHNOLOGIES AS DESCRIBED BY CMS-2020-01-R: HCPCS

## 2020-08-14 PROCEDURE — 99214 OFFICE O/P EST MOD 30 MIN: CPT | Mod: CS | Performed by: NURSE PRACTITIONER

## 2020-08-14 PROCEDURE — 87804 INFLUENZA ASSAY W/OPTIC: CPT | Mod: CS | Performed by: NURSE PRACTITIONER

## 2020-08-14 PROCEDURE — 87880 STREP A ASSAY W/OPTIC: CPT | Mod: CS | Performed by: NURSE PRACTITIONER

## 2020-08-14 RX ORDER — METHYLPREDNISOLONE 4 MG/1
TABLET ORAL
Qty: 21 TAB | Refills: 0 | Status: SHIPPED | OUTPATIENT
Start: 2020-08-14 | End: 2020-11-06

## 2020-08-14 ASSESSMENT — ENCOUNTER SYMPTOMS
SHORTNESS OF BREATH: 0
FEVER: 1
EYE PAIN: 0
DIZZINESS: 0
ORTHOPNEA: 0
WEAKNESS: 0
NAUSEA: 1
COUGH: 1
MYALGIAS: 1
HEADACHES: 1
TINGLING: 0
SEIZURES: 0
HEARTBURN: 0
BLOOD IN STOOL: 0
SINUS PAIN: 0
SWOLLEN GLANDS: 1
CONSTIPATION: 0
ABDOMINAL PAIN: 0
SPUTUM PRODUCTION: 0
SORE THROAT: 0
HEMOPTYSIS: 0
WHEEZING: 0
NECK PAIN: 0
BACK PAIN: 0
MEMORY LOSS: 0
CHILLS: 1
PALPITATIONS: 0
SENSORY CHANGE: 0
DIARRHEA: 0
VOMITING: 0

## 2020-08-14 ASSESSMENT — FIBROSIS 4 INDEX: FIB4 SCORE: 0.5

## 2020-08-14 ASSESSMENT — PAIN SCALES - GENERAL: PAINLEVEL: 10=SEVERE PAIN

## 2020-08-15 DIAGNOSIS — Z20.822 EXPOSURE TO COVID-19 VIRUS: ICD-10-CM

## 2020-08-15 DIAGNOSIS — R50.9 FEVER, UNSPECIFIED FEVER CAUSE: ICD-10-CM

## 2020-08-15 DIAGNOSIS — R52 GENERALIZED BODY ACHES: ICD-10-CM

## 2020-08-15 DIAGNOSIS — J02.9 PHARYNGITIS, UNSPECIFIED ETIOLOGY: ICD-10-CM

## 2020-08-15 LAB
COVID ORDER STATUS COVID19: NORMAL
SARS-COV-2 RNA RESP QL NAA+PROBE: NOTDETECTED
SPECIMEN SOURCE: NORMAL

## 2020-08-15 NOTE — PROGRESS NOTES
Subjective:      Margareth SMITH is a 49 y.o. female who presents with Coronavirus Screening (exposure at work), Pharyngitis (x2 days, pt states that it is her glands ), and Body Aches (x2 days, back, shoulders and joints)            Pharyngitis   This is a new problem. Episode onset: In the past 2 days. The problem has been gradually worsening. Neither side of throat is experiencing more pain than the other. The maximum temperature recorded prior to her arrival was 100.4 - 100.9 F. The pain is at a severity of 4/10. The pain is moderate. Associated symptoms include congestion, coughing, headaches and swollen glands. Pertinent negatives include no abdominal pain, diarrhea, ear pain, neck pain, shortness of breath or vomiting. She has had no exposure to strep or mono. Exposure to: Exposure to COVID-19 at work at [employer]. She has tried acetaminophen and NSAIDs for the symptoms. The treatment provided mild relief.     Patient does have an underlying history of asthma and has been doing her nebulized albuterol treatments at home.  She denies shortness of breath.      Review of Systems   Constitutional: Positive for chills, fever and malaise/fatigue.   HENT: Positive for congestion. Negative for ear pain, sinus pain and sore throat.    Eyes: Negative for pain.   Respiratory: Positive for cough. Negative for hemoptysis, sputum production, shortness of breath and wheezing.    Cardiovascular: Negative for chest pain, palpitations, orthopnea and leg swelling.   Gastrointestinal: Positive for nausea. Negative for abdominal pain, blood in stool, constipation, diarrhea, heartburn and vomiting.   Musculoskeletal: Positive for myalgias. Negative for back pain, joint pain and neck pain.   Skin: Negative for rash.   Neurological: Positive for headaches. Negative for dizziness, tingling, sensory change, seizures and weakness.   Psychiatric/Behavioral: Negative for memory loss and suicidal ideas.   All other systems reviewed and are  "negative.         Objective:     /88   Pulse (!) 105   Temp 36.6 °C (97.8 °F) (Temporal)   Resp 16   Ht 1.575 m (5' 2\")   Wt 92.4 kg (203 lb 9.6 oz)   SpO2 95%   BMI 37.24 kg/m²      Physical Exam  Vitals signs reviewed.   Constitutional:       General: She is not in acute distress.     Appearance: Normal appearance. She is ill-appearing. She is not diaphoretic.   HENT:      Head: Normocephalic and atraumatic.      Right Ear: Tympanic membrane, ear canal and external ear normal.      Left Ear: Tympanic membrane, ear canal and external ear normal.      Nose: Congestion present. No rhinorrhea.      Mouth/Throat:      Mouth: Mucous membranes are moist.      Pharynx: Posterior oropharyngeal erythema present. No oropharyngeal exudate.   Eyes:      Extraocular Movements: Extraocular movements intact.      Conjunctiva/sclera: Conjunctivae normal.      Pupils: Pupils are equal, round, and reactive to light.   Neck:      Musculoskeletal: Normal range of motion and neck supple. No neck rigidity.   Cardiovascular:      Rate and Rhythm: Regular rhythm. Tachycardia present.      Pulses: Normal pulses.      Heart sounds: No murmur. No friction rub. No gallop.    Pulmonary:      Effort: Pulmonary effort is normal. No respiratory distress.      Breath sounds: Normal breath sounds. No wheezing, rhonchi or rales.   Abdominal:      General: Bowel sounds are normal. There is no distension.      Palpations: Abdomen is soft. There is no mass.      Tenderness: There is no abdominal tenderness. There is no right CVA tenderness or left CVA tenderness.   Musculoskeletal: Normal range of motion.         General: No tenderness.      Right lower leg: No edema.      Left lower leg: No edema.   Lymphadenopathy:      Cervical: No cervical adenopathy.   Skin:     General: Skin is warm and dry.   Neurological:      Mental Status: She is alert and oriented to person, place, and time.   Psychiatric:         Mood and Affect: Mood normal.   "       Behavior: Behavior normal.              Negative POCT influenza   A rapid strep.  Assessment/Plan:        1. Generalized body aches  - POCT Rapid Strep A  - COVID/SARS COV-2 PCR; Future  - POCT Influenza A/B    2. Fever, unspecified fever cause  - POCT Rapid Strep A  - COVID/SARS COV-2 PCR; Future  - POCT Influenza A/B    3. Exposure to COVID-19 virus  - POCT Rapid Strep A  - COVID/SARS COV-2 PCR; Future  - POCT Influenza A/B    4. Pharyngitis, unspecified etiology  - POCT Rapid Strep A  - COVID/SARS COV-2 PCR; Future  - POCT Influenza A/B    5. Mild intermittent asthma with exacerbation  - methylPREDNISolone (MEDROL DOSEPAK) 4 MG Tablet Therapy Pack; Follow schedule on package instructions.  Dispense: 21 Tab; Refill: 0    All with COVID results      Discussed with patient that symptoms are suspicious for COVID-19 infection.  Patient does have negative influenza and rapid strep in the office today.  Vitals are stable at this time.  Patient is advised to self isolate and provided with self isolation precautions AVS information.  Discussed when to return to urgent care or ER including for worsening shortness of breath.  Patient verbalized understanding and has no additional questions.    Plan of care, medications and treatments reviewed with patient and or guardian.  Patient and or guardian voices understanding and agrees with the instructions provided. After visit summary reviewed with patient. Patient and or guardian understand the parameters for reevaluation and ER precautions discussed.     Follow up with primary care provider in the next 1-5 days for recheck as needed.  Discussed that urgent care setting has limited resources, therefore any worsening of symptoms should be evaluated in the ER. Patient and or guardian verbalized understanding.     Please note that this dictation was created using voice recognition software. I have made every reasonable attempt to correct obvious errors, but I expect that  there are errors of grammar and possibly content that I did not discover before finalizing the note.

## 2020-08-20 ENCOUNTER — TELEPHONE (OUTPATIENT)
Dept: URGENT CARE | Facility: PHYSICIAN GROUP | Age: 50
End: 2020-08-20

## 2020-08-20 NOTE — TELEPHONE ENCOUNTER
----- Message from JAVIER Law sent at 8/16/2020  1:16 PM PDT -----  Please notify patient her Covid testing was negative.  Thank you,  Clemencia  ----- Message -----  From: [facility], Lab  Sent: 8/15/2020   5:03 PM PDT  To: JAVIER Law

## 2020-11-06 ENCOUNTER — OFFICE VISIT (OUTPATIENT)
Dept: URGENT CARE | Facility: PHYSICIAN GROUP | Age: 50
End: 2020-11-06
Payer: COMMERCIAL

## 2020-11-06 ENCOUNTER — HOSPITAL ENCOUNTER (OUTPATIENT)
Facility: MEDICAL CENTER | Age: 50
End: 2020-11-06
Attending: FAMILY MEDICINE
Payer: COMMERCIAL

## 2020-11-06 VITALS
HEART RATE: 103 BPM | SYSTOLIC BLOOD PRESSURE: 110 MMHG | OXYGEN SATURATION: 94 % | RESPIRATION RATE: 18 BRPM | BODY MASS INDEX: 36.58 KG/M2 | WEIGHT: 198.8 LBS | DIASTOLIC BLOOD PRESSURE: 72 MMHG | TEMPERATURE: 97.3 F | HEIGHT: 62 IN

## 2020-11-06 DIAGNOSIS — R05.9 COUGH: ICD-10-CM

## 2020-11-06 DIAGNOSIS — R11.0 NAUSEA: ICD-10-CM

## 2020-11-06 DIAGNOSIS — R43.9 DECREASED TASTE AND SMELL: ICD-10-CM

## 2020-11-06 DIAGNOSIS — Z20.822 EXPOSURE TO COVID-19 VIRUS: ICD-10-CM

## 2020-11-06 DIAGNOSIS — R51.9 NONINTRACTABLE HEADACHE, UNSPECIFIED CHRONICITY PATTERN, UNSPECIFIED HEADACHE TYPE: ICD-10-CM

## 2020-11-06 DIAGNOSIS — J22 ACUTE LOWER RESPIRATORY INFECTION: ICD-10-CM

## 2020-11-06 DIAGNOSIS — R50.9 FEVER, UNSPECIFIED FEVER CAUSE: ICD-10-CM

## 2020-11-06 DIAGNOSIS — J20.9 ACUTE BRONCHITIS, UNSPECIFIED ORGANISM: ICD-10-CM

## 2020-11-06 DIAGNOSIS — R06.02 SHORTNESS OF BREATH: ICD-10-CM

## 2020-11-06 PROCEDURE — 99214 OFFICE O/P EST MOD 30 MIN: CPT | Mod: CS | Performed by: FAMILY MEDICINE

## 2020-11-06 PROCEDURE — U0003 INFECTIOUS AGENT DETECTION BY NUCLEIC ACID (DNA OR RNA); SEVERE ACUTE RESPIRATORY SYNDROME CORONAVIRUS 2 (SARS-COV-2) (CORONAVIRUS DISEASE [COVID-19]), AMPLIFIED PROBE TECHNIQUE, MAKING USE OF HIGH THROUGHPUT TECHNOLOGIES AS DESCRIBED BY CMS-2020-01-R: HCPCS

## 2020-11-06 RX ORDER — DOXYCYCLINE HYCLATE 100 MG
100 TABLET ORAL 2 TIMES DAILY
Qty: 14 TAB | Refills: 0 | Status: SHIPPED | OUTPATIENT
Start: 2020-11-06 | End: 2020-11-06 | Stop reason: SDUPTHER

## 2020-11-06 RX ORDER — BENZONATATE 200 MG/1
CAPSULE ORAL
Qty: 30 CAP | Refills: 0 | Status: SHIPPED | OUTPATIENT
Start: 2020-11-06 | End: 2020-11-06 | Stop reason: SDUPTHER

## 2020-11-06 RX ORDER — METHYLPREDNISOLONE 4 MG/1
TABLET ORAL
Qty: 21 TAB | Refills: 0 | Status: SHIPPED | OUTPATIENT
Start: 2020-11-06 | End: 2020-11-12

## 2020-11-06 RX ORDER — DOXYCYCLINE HYCLATE 100 MG
100 TABLET ORAL 2 TIMES DAILY
Qty: 14 TAB | Refills: 0 | Status: SHIPPED | OUTPATIENT
Start: 2020-11-06 | End: 2020-11-13

## 2020-11-06 RX ORDER — BENZONATATE 200 MG/1
CAPSULE ORAL
Qty: 30 CAP | Refills: 0 | Status: SHIPPED | OUTPATIENT
Start: 2020-11-06 | End: 2021-04-28

## 2020-11-06 RX ORDER — METHYLPREDNISOLONE 4 MG/1
TABLET ORAL
Qty: 21 TAB | Refills: 0 | Status: SHIPPED | OUTPATIENT
Start: 2020-11-06 | End: 2020-11-06 | Stop reason: SDUPTHER

## 2020-11-06 RX ORDER — TRAMADOL HYDROCHLORIDE 50 MG/1
50 TABLET ORAL EVERY 4 HOURS PRN
COMMUNITY
End: 2021-04-28

## 2020-11-06 ASSESSMENT — FIBROSIS 4 INDEX: FIB4 SCORE: 0.51

## 2020-11-06 NOTE — PROGRESS NOTES
Chief Complaint:    Chief Complaint   Patient presents with   • Fever     all symptoms started 4-5 days ago.    • Shortness of Breath   • Cough     chronic asthma   • Coronavirus Screening     positive contact with 3 positive cases       History of Present Illness:    This is a new problem. Symptoms have worsened in the past 3 days, but she has had symptoms for a few weeks. She has had fever, headache, decreased smell and taste, cough productive of purulent mucus, shortness of breath, and nausea. She has MDI and nebs to use. Doxycycline, Medrol Dose Reed, and Tessalon have worked and been tolerated for similar previous symptoms. There have been co-workers positive for COVID-19.      Review of Systems:    Constitutional: See HPI.   Eyes: Negative for change in vision, photophobia, pain, redness, and discharge.  ENT: Negative for ear pain, ear discharge, hearing loss, tinnitus, nasal congestion, nosebleeds, and sore throat.    Respiratory: See HPI.   Cardiovascular: Negative for chest pain, palpitations, orthopnea, claudication, leg swelling, and PND.   Gastrointestinal: See HPI.  Genitourinary: Negative for dysuria, urinary urgency, urinary frequency, hematuria, and flank pain.   Musculoskeletal: Negative for myalgias, joint pain, neck pain, and back pain.   Skin: Negative for rash and itching.   Neurological: Negative for dizziness, tingling, tremors, sensory change, speech change, focal weakness, seizures, and loss of consciousness.   Endo: Negative for polydipsia.   Heme: Does not bruise/bleed easily.   Psychiatric/Behavioral: Negative for depression, suicidal ideas, hallucinations, memory loss and substance abuse. The patient is not nervous/anxious and does not have insomnia.        Past Medical History:    Past Medical History:   Diagnosis Date   • Asthma    • Bipolar 1 disorder, depressed (HCC)    • Hypothyroidism      Past Surgical History:    Past Surgical History:   Procedure Laterality Date   • HYSTERECTOMY,  TOTAL ABDOMINAL       Social History:    Social History     Socioeconomic History   • Marital status: Single     Spouse name: Not on file   • Number of children: Not on file   • Years of education: Not on file   • Highest education level: Not on file   Occupational History   • Not on file   Social Needs   • Financial resource strain: Not on file   • Food insecurity     Worry: Not on file     Inability: Not on file   • Transportation needs     Medical: Not on file     Non-medical: Not on file   Tobacco Use   • Smoking status: Former Smoker     Years: 25.00   • Smokeless tobacco: Never Used   • Tobacco comment: 3-4 cigarrette per day   Substance and Sexual Activity   • Alcohol use: No   • Drug use: No   • Sexual activity: Never   Lifestyle   • Physical activity     Days per week: Not on file     Minutes per session: Not on file   • Stress: Not on file   Relationships   • Social connections     Talks on phone: Not on file     Gets together: Not on file     Attends Restoration service: Not on file     Active member of club or organization: Not on file     Attends meetings of clubs or organizations: Not on file     Relationship status: Not on file   • Intimate partner violence     Fear of current or ex partner: Not on file     Emotionally abused: Not on file     Physically abused: Not on file     Forced sexual activity: Not on file   Other Topics Concern   • Not on file   Social History Narrative   • Not on file     Family History:    History reviewed. No pertinent family history.    Medications:    Current Outpatient Medications on File Prior to Visit   Medication Sig Dispense Refill   • metFORMIN (GLUCOPHAGE) 500 MG Tab Take 500 mg by mouth 2 times a day, with meals.     • traMADol (ULTRAM) 50 MG Tab Take 50 mg by mouth every four hours as needed.     • omeprazole (PRILOSEC) 40 MG delayed-release capsule Take 1 Cap by mouth every day. 90 Cap 0   • albuterol (PROVENTIL) 2.5mg/3ml Nebu Soln solution for nebulization 3 mL  "by Nebulization route every four hours as needed for Shortness of Breath. 50 Bullet 0   • albuterol 108 (90 Base) MCG/ACT Aero Soln inhalation aerosol Inhale 2 Puffs by mouth every 6 hours as needed for Shortness of Breath. 8.5 g 2   • chlorthalidone (HYGROTON) 25 MG Tab Take 1 Tab by mouth every day. 90 Tab 0   • fluticasone (FLONASE) 50 MCG/ACT nasal spray Spray 2 Sprays in nose every day. 16 g 2   • Respiratory Therapy Supplies (NEBULIZER COMPRESSOR) Kit Dispense nebulizer machine and all associated supplies. 1 Kit 0     No current facility-administered medications on file prior to visit.      Allergies:    Allergies   Allergen Reactions   • Codeine Swelling     Throat edema       Vitals:    Vitals:    11/06/20 1533   BP: 110/72   Pulse: (!) 103   Resp: 18   Temp: 36.3 °C (97.3 °F)   TempSrc: Temporal   SpO2: 94%   Weight: 90.2 kg (198 lb 12.8 oz)   Height: 1.575 m (5' 2\")       Physical Exam:    Constitutional: Vital signs reviewed. Appears well-developed and well-nourished. Occl cough.  Eyes: Sclera white, conjunctivae clear. PERRLA.  ENT: External ears normal. Hearing normal. Nasal mucosa pink. Lips/teeth are normal. Oral mucosa pink and moist. Posterior pharynx: WNL.  Neck: Neck supple.   Cardiovascular: Regular rate and rhythm. No murmur.  Pulmonary/Chest: Respirations non-labored. Clear to auscultation bilaterally.  Lymph: Cervical nodes without tenderness or enlargement.  Musculoskeletal: Normal gait. Normal range of motion. No muscular atrophy or weakness.  Neurological: Alert and oriented to person, place, and time. CN 2-12 intact. Muscle tone normal. Coordination normal.   Skin: No rashes or lesions. Warm, dry, normal turgor.  Psychiatric: Normal mood and affect. Behavior is normal. Judgment and thought content normal.       Assessment / Plan:    1. Fever, unspecified fever cause  - COVID/SARS COV-2 PCR; Future    2. Nonintractable headache, unspecified chronicity pattern, unspecified headache type  - " COVID/SARS COV-2 PCR; Future    3. Decreased taste and smell  - COVID/SARS COV-2 PCR; Future    4. Cough  - COVID/SARS COV-2 PCR; Future  - benzonatate (TESSALON) 200 MG capsule; 1 CAP BY MOUTH UP TO 3 TIMES A DAY ONLY IF NEEDED FOR COUGH.  Dispense: 30 Cap; Refill: 0    5. Shortness of breath  - COVID/SARS COV-2 PCR; Future    6. Nausea  - COVID/SARS COV-2 PCR; Future    7. Acute lower respiratory infection  - doxycycline (VIBRAMYCIN) 100 MG Tab; Take 1 Tab by mouth 2 times a day for 7 days.  Dispense: 14 Tab; Refill: 0    8. Acute bronchitis, unspecified organism  - methylPREDNISolone (MEDROL DOSEPAK) 4 MG Tablet Therapy Pack; TAKE AS DIRECTED ON PACKAGE.  Dispense: 21 Tab; Refill: 0    9. Exposure to COVID-19 virus  - COVID/SARS COV-2 PCR; Future      Discussed with her DDX, management options, and risks, benefits, and alternatives to treatment plan agreed upon.    Patient is clinically stable.    Agreeable to medications prescribed.    Agreeable to COVID-19 test obtained.    Advised test result will show in [facility].    Patient will follow-up if needed while waiting for test result.

## 2021-03-15 ENCOUNTER — HOSPITAL ENCOUNTER (OUTPATIENT)
Dept: RADIOLOGY | Facility: MEDICAL CENTER | Age: 51
End: 2021-03-15
Attending: COLON & RECTAL SURGERY
Payer: COMMERCIAL

## 2021-03-15 ENCOUNTER — HOSPITAL ENCOUNTER (OUTPATIENT)
Dept: CARDIOLOGY | Facility: MEDICAL CENTER | Age: 51
End: 2021-03-15
Attending: COLON & RECTAL SURGERY
Payer: COMMERCIAL

## 2021-03-15 ENCOUNTER — HOSPITAL ENCOUNTER (OUTPATIENT)
Dept: LAB | Facility: MEDICAL CENTER | Age: 51
End: 2021-03-15
Attending: COLON & RECTAL SURGERY
Payer: COMMERCIAL

## 2021-03-15 DIAGNOSIS — E66.9 OBESITY, UNSPECIFIED CLASSIFICATION, UNSPECIFIED OBESITY TYPE, UNSPECIFIED WHETHER SERIOUS COMORBIDITY PRESENT: ICD-10-CM

## 2021-03-15 DIAGNOSIS — K21.9 GASTROESOPHAGEAL REFLUX DISEASE, UNSPECIFIED WHETHER ESOPHAGITIS PRESENT: ICD-10-CM

## 2021-03-15 DIAGNOSIS — Z01.818 ENCOUNTER FOR OTHER PREPROCEDURAL EXAMINATION: ICD-10-CM

## 2021-03-15 LAB
ALBUMIN SERPL BCP-MCNC: 4.5 G/DL (ref 3.2–4.9)
ALBUMIN/GLOB SERPL: 1.6 G/DL
ALP SERPL-CCNC: 53 U/L (ref 30–99)
ALT SERPL-CCNC: 23 U/L (ref 2–50)
ANION GAP SERPL CALC-SCNC: 14 MMOL/L (ref 7–16)
AST SERPL-CCNC: 24 U/L (ref 12–45)
BASOPHILS # BLD AUTO: 0.6 % (ref 0–1.8)
BASOPHILS # BLD: 0.08 K/UL (ref 0–0.12)
BILIRUB SERPL-MCNC: 0.4 MG/DL (ref 0.1–1.5)
BUN SERPL-MCNC: 15 MG/DL (ref 8–22)
CALCIUM SERPL-MCNC: 10 MG/DL (ref 8.5–10.5)
CHLORIDE SERPL-SCNC: 96 MMOL/L (ref 96–112)
CO2 SERPL-SCNC: 27 MMOL/L (ref 20–33)
CREAT SERPL-MCNC: 0.83 MG/DL (ref 0.5–1.4)
EKG IMPRESSION: NORMAL
EOSINOPHIL # BLD AUTO: 0.43 K/UL (ref 0–0.51)
EOSINOPHIL NFR BLD: 3.4 % (ref 0–6.9)
ERYTHROCYTE [DISTWIDTH] IN BLOOD BY AUTOMATED COUNT: 42.5 FL (ref 35.9–50)
EST. AVERAGE GLUCOSE BLD GHB EST-MCNC: 114 MG/DL
GLOBULIN SER CALC-MCNC: 2.9 G/DL (ref 1.9–3.5)
GLUCOSE SERPL-MCNC: 98 MG/DL (ref 65–99)
HBA1C MFR BLD: 5.6 % (ref 4–5.6)
HCT VFR BLD AUTO: 48.3 % (ref 37–47)
HGB BLD-MCNC: 17.1 G/DL (ref 12–16)
IMM GRANULOCYTES # BLD AUTO: 0.04 K/UL (ref 0–0.11)
IMM GRANULOCYTES NFR BLD AUTO: 0.3 % (ref 0–0.9)
LYMPHOCYTES # BLD AUTO: 4.34 K/UL (ref 1–4.8)
LYMPHOCYTES NFR BLD: 34.4 % (ref 22–41)
MCH RBC QN AUTO: 33.5 PG (ref 27–33)
MCHC RBC AUTO-ENTMCNC: 35.4 G/DL (ref 33.6–35)
MCV RBC AUTO: 94.5 FL (ref 81.4–97.8)
MONOCYTES # BLD AUTO: 0.86 K/UL (ref 0–0.85)
MONOCYTES NFR BLD AUTO: 6.8 % (ref 0–13.4)
NEUTROPHILS # BLD AUTO: 6.85 K/UL (ref 2–7.15)
NEUTROPHILS NFR BLD: 54.5 % (ref 44–72)
NRBC # BLD AUTO: 0 K/UL
NRBC BLD-RTO: 0 /100 WBC
PLATELET # BLD AUTO: 340 K/UL (ref 164–446)
PMV BLD AUTO: 10 FL (ref 9–12.9)
POTASSIUM SERPL-SCNC: 3.4 MMOL/L (ref 3.6–5.5)
PROT SERPL-MCNC: 7.4 G/DL (ref 6–8.2)
RBC # BLD AUTO: 5.11 M/UL (ref 4.2–5.4)
SODIUM SERPL-SCNC: 137 MMOL/L (ref 135–145)
TSH SERPL DL<=0.005 MIU/L-ACNC: 3.76 UIU/ML (ref 0.38–5.33)
WBC # BLD AUTO: 12.6 K/UL (ref 4.8–10.8)

## 2021-03-15 PROCEDURE — 74246 X-RAY XM UPR GI TRC 2CNTRST: CPT

## 2021-03-15 PROCEDURE — A9270 NON-COVERED ITEM OR SERVICE: HCPCS | Performed by: COLON & RECTAL SURGERY

## 2021-03-15 PROCEDURE — 80053 COMPREHEN METABOLIC PANEL: CPT

## 2021-03-15 PROCEDURE — 85025 COMPLETE CBC W/AUTO DIFF WBC: CPT

## 2021-03-15 PROCEDURE — 93005 ELECTROCARDIOGRAM TRACING: CPT | Performed by: COLON & RECTAL SURGERY

## 2021-03-15 PROCEDURE — 84443 ASSAY THYROID STIM HORMONE: CPT

## 2021-03-15 PROCEDURE — 700112 HCHG RX REV CODE 229: Performed by: COLON & RECTAL SURGERY

## 2021-03-15 PROCEDURE — 93010 ELECTROCARDIOGRAM REPORT: CPT | Performed by: INTERNAL MEDICINE

## 2021-03-15 PROCEDURE — 71046 X-RAY EXAM CHEST 2 VIEWS: CPT

## 2021-03-15 PROCEDURE — 83036 HEMOGLOBIN GLYCOSYLATED A1C: CPT

## 2021-03-15 PROCEDURE — 36415 COLL VENOUS BLD VENIPUNCTURE: CPT

## 2021-03-15 RX ADMIN — ANTACID/ANTIFLATULENT 1 PACKET: 380; 550; 10; 10 GRANULE, EFFERVESCENT ORAL at 08:30

## 2021-04-08 ENCOUNTER — PRE-ADMISSION TESTING (OUTPATIENT)
Dept: ADMISSIONS | Facility: MEDICAL CENTER | Age: 51
DRG: 619 | End: 2021-04-08
Attending: COLON & RECTAL SURGERY
Payer: COMMERCIAL

## 2021-04-08 DIAGNOSIS — Z01.812 PRE-OPERATIVE LABORATORY EXAMINATION: ICD-10-CM

## 2021-04-08 LAB
ALBUMIN SERPL BCP-MCNC: 4.4 G/DL (ref 3.2–4.9)
ALBUMIN/GLOB SERPL: 1.4 G/DL
ALP SERPL-CCNC: 51 U/L (ref 30–99)
ALT SERPL-CCNC: 20 U/L (ref 2–50)
ANION GAP SERPL CALC-SCNC: 11 MMOL/L (ref 7–16)
AST SERPL-CCNC: 14 U/L (ref 12–45)
BASOPHILS # BLD AUTO: 0.4 % (ref 0–1.8)
BASOPHILS # BLD: 0.03 K/UL (ref 0–0.12)
BILIRUB SERPL-MCNC: 0.6 MG/DL (ref 0.1–1.5)
BUN SERPL-MCNC: 13 MG/DL (ref 8–22)
CALCIUM SERPL-MCNC: 9.5 MG/DL (ref 8.5–10.5)
CHLORIDE SERPL-SCNC: 98 MMOL/L (ref 96–112)
CO2 SERPL-SCNC: 25 MMOL/L (ref 20–33)
CREAT SERPL-MCNC: 0.96 MG/DL (ref 0.5–1.4)
EOSINOPHIL # BLD AUTO: 0.28 K/UL (ref 0–0.51)
EOSINOPHIL NFR BLD: 3.6 % (ref 0–6.9)
ERYTHROCYTE [DISTWIDTH] IN BLOOD BY AUTOMATED COUNT: 43.6 FL (ref 35.9–50)
GLOBULIN SER CALC-MCNC: 3.1 G/DL (ref 1.9–3.5)
GLUCOSE SERPL-MCNC: 98 MG/DL (ref 65–99)
HCT VFR BLD AUTO: 50.2 % (ref 37–47)
HGB BLD-MCNC: 17 G/DL (ref 12–16)
IMM GRANULOCYTES # BLD AUTO: 0.03 K/UL (ref 0–0.11)
IMM GRANULOCYTES NFR BLD AUTO: 0.4 % (ref 0–0.9)
INR PPP: 0.87 (ref 0.87–1.13)
LYMPHOCYTES # BLD AUTO: 2.72 K/UL (ref 1–4.8)
LYMPHOCYTES NFR BLD: 35 % (ref 22–41)
MCH RBC QN AUTO: 32.3 PG (ref 27–33)
MCHC RBC AUTO-ENTMCNC: 33.9 G/DL (ref 33.6–35)
MCV RBC AUTO: 95.3 FL (ref 81.4–97.8)
MONOCYTES # BLD AUTO: 0.58 K/UL (ref 0–0.85)
MONOCYTES NFR BLD AUTO: 7.5 % (ref 0–13.4)
NEUTROPHILS # BLD AUTO: 4.13 K/UL (ref 2–7.15)
NEUTROPHILS NFR BLD: 53.1 % (ref 44–72)
NRBC # BLD AUTO: 0 K/UL
NRBC BLD-RTO: 0 /100 WBC
PLATELET # BLD AUTO: 397 K/UL (ref 164–446)
PMV BLD AUTO: 10.1 FL (ref 9–12.9)
POTASSIUM SERPL-SCNC: 3.9 MMOL/L (ref 3.6–5.5)
PROT SERPL-MCNC: 7.5 G/DL (ref 6–8.2)
PROTHROMBIN TIME: 12.2 SEC (ref 12–14.6)
RBC # BLD AUTO: 5.27 M/UL (ref 4.2–5.4)
SODIUM SERPL-SCNC: 134 MMOL/L (ref 135–145)
WBC # BLD AUTO: 7.8 K/UL (ref 4.8–10.8)

## 2021-04-08 PROCEDURE — 80053 COMPREHEN METABOLIC PANEL: CPT

## 2021-04-08 PROCEDURE — 85610 PROTHROMBIN TIME: CPT

## 2021-04-08 PROCEDURE — 85025 COMPLETE CBC W/AUTO DIFF WBC: CPT

## 2021-04-08 PROCEDURE — 36415 COLL VENOUS BLD VENIPUNCTURE: CPT

## 2021-04-08 RX ORDER — LAMOTRIGINE 150 MG/1
150 TABLET ORAL DAILY
COMMUNITY
End: 2022-05-19 | Stop reason: SDUPTHER

## 2021-04-08 ASSESSMENT — FIBROSIS 4 INDEX: FIB4 SCORE: 0.71

## 2021-04-10 ENCOUNTER — PRE-ADMISSION TESTING (OUTPATIENT)
Dept: ADMISSIONS | Facility: MEDICAL CENTER | Age: 51
DRG: 619 | End: 2021-04-10
Attending: COLON & RECTAL SURGERY
Payer: COMMERCIAL

## 2021-04-10 DIAGNOSIS — Z01.812 PRE-OPERATIVE LABORATORY EXAMINATION: ICD-10-CM

## 2021-04-10 PROCEDURE — U0003 INFECTIOUS AGENT DETECTION BY NUCLEIC ACID (DNA OR RNA); SEVERE ACUTE RESPIRATORY SYNDROME CORONAVIRUS 2 (SARS-COV-2) (CORONAVIRUS DISEASE [COVID-19]), AMPLIFIED PROBE TECHNIQUE, MAKING USE OF HIGH THROUGHPUT TECHNOLOGIES AS DESCRIBED BY CMS-2020-01-R: HCPCS

## 2021-04-10 PROCEDURE — U0005 INFEC AGEN DETEC AMPLI PROBE: HCPCS

## 2021-04-10 PROCEDURE — C9803 HOPD COVID-19 SPEC COLLECT: HCPCS

## 2021-04-16 ENCOUNTER — HOSPITAL ENCOUNTER (INPATIENT)
Facility: MEDICAL CENTER | Age: 51
LOS: 5 days | DRG: 619 | End: 2021-04-21
Attending: COLON & RECTAL SURGERY | Admitting: COLON & RECTAL SURGERY
Payer: COMMERCIAL

## 2021-04-16 ENCOUNTER — ANESTHESIA (OUTPATIENT)
Dept: SURGERY | Facility: MEDICAL CENTER | Age: 51
DRG: 619 | End: 2021-04-16
Payer: COMMERCIAL

## 2021-04-16 ENCOUNTER — ANESTHESIA EVENT (OUTPATIENT)
Dept: SURGERY | Facility: MEDICAL CENTER | Age: 51
DRG: 619 | End: 2021-04-16
Payer: COMMERCIAL

## 2021-04-16 DIAGNOSIS — R09.02 HYPOXIA: ICD-10-CM

## 2021-04-16 LAB
GLUCOSE BLD-MCNC: 152 MG/DL (ref 65–99)
PATHOLOGY CONSULT NOTE: NORMAL

## 2021-04-16 PROCEDURE — 160035 HCHG PACU - 1ST 60 MINS PHASE I: Performed by: COLON & RECTAL SURGERY

## 2021-04-16 PROCEDURE — 501838 HCHG SUTURE GENERAL: Performed by: COLON & RECTAL SURGERY

## 2021-04-16 PROCEDURE — 502570 HCHG PACK, GASTRIC BANDING: Performed by: COLON & RECTAL SURGERY

## 2021-04-16 PROCEDURE — A9270 NON-COVERED ITEM OR SERVICE: HCPCS | Performed by: COLON & RECTAL SURGERY

## 2021-04-16 PROCEDURE — 770006 HCHG ROOM/CARE - MED/SURG/GYN SEMI*

## 2021-04-16 PROCEDURE — 700111 HCHG RX REV CODE 636 W/ 250 OVERRIDE (IP): Performed by: COLON & RECTAL SURGERY

## 2021-04-16 PROCEDURE — 502240 HCHG MISC OR SUPPLY RC 0272: Performed by: COLON & RECTAL SURGERY

## 2021-04-16 PROCEDURE — 501497 HCHG SURGICLIP: Performed by: COLON & RECTAL SURGERY

## 2021-04-16 PROCEDURE — 700102 HCHG RX REV CODE 250 W/ 637 OVERRIDE(OP): Performed by: ANESTHESIOLOGY

## 2021-04-16 PROCEDURE — 700101 HCHG RX REV CODE 250: Performed by: COLON & RECTAL SURGERY

## 2021-04-16 PROCEDURE — 700111 HCHG RX REV CODE 636 W/ 250 OVERRIDE (IP): Performed by: PHYSICIAN ASSISTANT

## 2021-04-16 PROCEDURE — 160036 HCHG PACU - EA ADDL 30 MINS PHASE I: Performed by: COLON & RECTAL SURGERY

## 2021-04-16 PROCEDURE — 700105 HCHG RX REV CODE 258: Performed by: PHYSICIAN ASSISTANT

## 2021-04-16 PROCEDURE — 160029 HCHG SURGERY MINUTES - 1ST 30 MINS LEVEL 4: Performed by: COLON & RECTAL SURGERY

## 2021-04-16 PROCEDURE — 160041 HCHG SURGERY MINUTES - EA ADDL 1 MIN LEVEL 4: Performed by: COLON & RECTAL SURGERY

## 2021-04-16 PROCEDURE — 501570 HCHG TROCAR, SEPARATOR: Performed by: COLON & RECTAL SURGERY

## 2021-04-16 PROCEDURE — 700111 HCHG RX REV CODE 636 W/ 250 OVERRIDE (IP): Performed by: ANESTHESIOLOGY

## 2021-04-16 PROCEDURE — 501583 HCHG TROCAR, THRD CAN&SEAL 5X100: Performed by: COLON & RECTAL SURGERY

## 2021-04-16 PROCEDURE — 501399 HCHG SPECIMAN BAG, ENDO CATC: Performed by: COLON & RECTAL SURGERY

## 2021-04-16 PROCEDURE — 160048 HCHG OR STATISTICAL LEVEL 1-5: Performed by: COLON & RECTAL SURGERY

## 2021-04-16 PROCEDURE — 700105 HCHG RX REV CODE 258: Performed by: ANESTHESIOLOGY

## 2021-04-16 PROCEDURE — 0DB64Z3 EXCISION OF STOMACH, PERCUTANEOUS ENDOSCOPIC APPROACH, VERTICAL: ICD-10-PCS | Performed by: COLON & RECTAL SURGERY

## 2021-04-16 PROCEDURE — 160009 HCHG ANES TIME/MIN: Performed by: COLON & RECTAL SURGERY

## 2021-04-16 PROCEDURE — 700105 HCHG RX REV CODE 258: Performed by: COLON & RECTAL SURGERY

## 2021-04-16 PROCEDURE — 700102 HCHG RX REV CODE 250 W/ 637 OVERRIDE(OP): Performed by: COLON & RECTAL SURGERY

## 2021-04-16 PROCEDURE — 88307 TISSUE EXAM BY PATHOLOGIST: CPT

## 2021-04-16 PROCEDURE — A9270 NON-COVERED ITEM OR SERVICE: HCPCS | Performed by: PHYSICIAN ASSISTANT

## 2021-04-16 PROCEDURE — 0FB24ZX EXCISION OF LEFT LOBE LIVER, PERCUTANEOUS ENDOSCOPIC APPROACH, DIAGNOSTIC: ICD-10-PCS | Performed by: COLON & RECTAL SURGERY

## 2021-04-16 PROCEDURE — 700102 HCHG RX REV CODE 250 W/ 637 OVERRIDE(OP): Performed by: PHYSICIAN ASSISTANT

## 2021-04-16 PROCEDURE — A9270 NON-COVERED ITEM OR SERVICE: HCPCS | Performed by: ANESTHESIOLOGY

## 2021-04-16 PROCEDURE — 160002 HCHG RECOVERY MINUTES (STAT): Performed by: COLON & RECTAL SURGERY

## 2021-04-16 PROCEDURE — 82962 GLUCOSE BLOOD TEST: CPT

## 2021-04-16 RX ORDER — PROMETHAZINE HYDROCHLORIDE 25 MG/1
25 SUPPOSITORY RECTAL EVERY 4 HOURS PRN
Status: DISCONTINUED | OUTPATIENT
Start: 2021-04-16 | End: 2021-04-21 | Stop reason: HOSPADM

## 2021-04-16 RX ORDER — ONDANSETRON 2 MG/ML
4 INJECTION INTRAMUSCULAR; INTRAVENOUS
Status: COMPLETED | OUTPATIENT
Start: 2021-04-16 | End: 2021-04-16

## 2021-04-16 RX ORDER — ACETAMINOPHEN 500 MG
1000 TABLET ORAL EVERY 6 HOURS PRN
Status: DISCONTINUED | OUTPATIENT
Start: 2021-04-21 | End: 2021-04-21 | Stop reason: HOSPADM

## 2021-04-16 RX ORDER — OXYCODONE HCL 5 MG/5 ML
5 SOLUTION, ORAL ORAL
Status: DISCONTINUED | OUTPATIENT
Start: 2021-04-16 | End: 2021-04-21 | Stop reason: HOSPADM

## 2021-04-16 RX ORDER — HYDROMORPHONE HYDROCHLORIDE 1 MG/ML
0.5 INJECTION, SOLUTION INTRAMUSCULAR; INTRAVENOUS; SUBCUTANEOUS
Status: DISCONTINUED | OUTPATIENT
Start: 2021-04-16 | End: 2021-04-21 | Stop reason: HOSPADM

## 2021-04-16 RX ORDER — LABETALOL HYDROCHLORIDE 5 MG/ML
5 INJECTION, SOLUTION INTRAVENOUS
Status: DISCONTINUED | OUTPATIENT
Start: 2021-04-16 | End: 2021-04-16 | Stop reason: HOSPADM

## 2021-04-16 RX ORDER — OXYCODONE HCL 5 MG/5 ML
10 SOLUTION, ORAL ORAL
Status: COMPLETED | OUTPATIENT
Start: 2021-04-16 | End: 2021-04-16

## 2021-04-16 RX ORDER — GABAPENTIN 300 MG/1
300 CAPSULE ORAL 3 TIMES DAILY
Status: DISCONTINUED | OUTPATIENT
Start: 2021-04-16 | End: 2021-04-21 | Stop reason: HOSPADM

## 2021-04-16 RX ORDER — ACETAMINOPHEN 10 MG/ML
1000 INJECTION, SOLUTION INTRAVENOUS EVERY 6 HOURS
Status: COMPLETED | OUTPATIENT
Start: 2021-04-16 | End: 2021-04-17

## 2021-04-16 RX ORDER — CALCIUM CARBONATE 500 MG/1
500 TABLET, CHEWABLE ORAL
Status: DISCONTINUED | OUTPATIENT
Start: 2021-04-16 | End: 2021-04-21 | Stop reason: HOSPADM

## 2021-04-16 RX ORDER — SODIUM CHLORIDE, SODIUM LACTATE, POTASSIUM CHLORIDE, CALCIUM CHLORIDE 600; 310; 30; 20 MG/100ML; MG/100ML; MG/100ML; MG/100ML
INJECTION, SOLUTION INTRAVENOUS CONTINUOUS
Status: DISCONTINUED | OUTPATIENT
Start: 2021-04-16 | End: 2021-04-16

## 2021-04-16 RX ORDER — ACETAMINOPHEN 10 MG/ML
1 INJECTION, SOLUTION INTRAVENOUS ONCE
Status: COMPLETED | OUTPATIENT
Start: 2021-04-16 | End: 2021-04-16

## 2021-04-16 RX ORDER — LORAZEPAM 2 MG/ML
0.5 INJECTION INTRAMUSCULAR
Status: DISCONTINUED | OUTPATIENT
Start: 2021-04-16 | End: 2021-04-16 | Stop reason: HOSPADM

## 2021-04-16 RX ORDER — HALOPERIDOL 5 MG/ML
1 INJECTION INTRAMUSCULAR EVERY 6 HOURS PRN
Status: DISCONTINUED | OUTPATIENT
Start: 2021-04-16 | End: 2021-04-21 | Stop reason: HOSPADM

## 2021-04-16 RX ORDER — DIPHENHYDRAMINE HYDROCHLORIDE 50 MG/ML
12.5 INJECTION INTRAMUSCULAR; INTRAVENOUS EVERY 6 HOURS PRN
Status: DISCONTINUED | OUTPATIENT
Start: 2021-04-16 | End: 2021-04-21 | Stop reason: HOSPADM

## 2021-04-16 RX ORDER — HYDROMORPHONE HYDROCHLORIDE 1 MG/ML
0.1 INJECTION, SOLUTION INTRAMUSCULAR; INTRAVENOUS; SUBCUTANEOUS
Status: DISCONTINUED | OUTPATIENT
Start: 2021-04-16 | End: 2021-04-16 | Stop reason: HOSPADM

## 2021-04-16 RX ORDER — SCOLOPAMINE TRANSDERMAL SYSTEM 1 MG/1
1 PATCH, EXTENDED RELEASE TRANSDERMAL ONCE
Status: DISCONTINUED | OUTPATIENT
Start: 2021-04-16 | End: 2021-04-16 | Stop reason: HOSPADM

## 2021-04-16 RX ORDER — DEXAMETHASONE SODIUM PHOSPHATE 4 MG/ML
INJECTION, SOLUTION INTRA-ARTICULAR; INTRALESIONAL; INTRAMUSCULAR; INTRAVENOUS; SOFT TISSUE PRN
Status: DISCONTINUED | OUTPATIENT
Start: 2021-04-16 | End: 2021-04-16 | Stop reason: SURG

## 2021-04-16 RX ORDER — HYDROMORPHONE HYDROCHLORIDE 1 MG/ML
0.4 INJECTION, SOLUTION INTRAMUSCULAR; INTRAVENOUS; SUBCUTANEOUS
Status: DISCONTINUED | OUTPATIENT
Start: 2021-04-16 | End: 2021-04-16 | Stop reason: HOSPADM

## 2021-04-16 RX ORDER — DEXTROSE MONOHYDRATE 25 G/50ML
50 INJECTION, SOLUTION INTRAVENOUS PRN
Status: DISCONTINUED | OUTPATIENT
Start: 2021-04-16 | End: 2021-04-21 | Stop reason: HOSPADM

## 2021-04-16 RX ORDER — SODIUM CHLORIDE, SODIUM LACTATE, POTASSIUM CHLORIDE, AND CALCIUM CHLORIDE .6; .31; .03; .02 G/100ML; G/100ML; G/100ML; G/100ML
500 INJECTION, SOLUTION INTRAVENOUS
Status: DISCONTINUED | OUTPATIENT
Start: 2021-04-16 | End: 2021-04-21 | Stop reason: HOSPADM

## 2021-04-16 RX ORDER — LAMOTRIGINE 100 MG/1
150 TABLET ORAL DAILY
Status: DISCONTINUED | OUTPATIENT
Start: 2021-04-16 | End: 2021-04-21 | Stop reason: HOSPADM

## 2021-04-16 RX ORDER — SCOLOPAMINE TRANSDERMAL SYSTEM 1 MG/1
1 PATCH, EXTENDED RELEASE TRANSDERMAL
COMMUNITY
End: 2021-04-28

## 2021-04-16 RX ORDER — CHLORTHALIDONE 25 MG/1
25 TABLET ORAL DAILY
Status: DISCONTINUED | OUTPATIENT
Start: 2021-04-16 | End: 2021-04-21 | Stop reason: HOSPADM

## 2021-04-16 RX ORDER — OXYCODONE HCL 10 MG/1
10 TABLET, FILM COATED, EXTENDED RELEASE ORAL ONCE
Status: COMPLETED | OUTPATIENT
Start: 2021-04-16 | End: 2021-04-16

## 2021-04-16 RX ORDER — HALOPERIDOL 5 MG/ML
1 INJECTION INTRAMUSCULAR
Status: COMPLETED | OUTPATIENT
Start: 2021-04-16 | End: 2021-04-16

## 2021-04-16 RX ORDER — SIMETHICONE 80 MG
80 TABLET,CHEWABLE ORAL 3 TIMES DAILY PRN
Status: DISCONTINUED | OUTPATIENT
Start: 2021-04-16 | End: 2021-04-21 | Stop reason: HOSPADM

## 2021-04-16 RX ORDER — ENALAPRILAT 1.25 MG/ML
2.5 INJECTION INTRAVENOUS EVERY 6 HOURS PRN
Status: DISCONTINUED | OUTPATIENT
Start: 2021-04-16 | End: 2021-04-21 | Stop reason: HOSPADM

## 2021-04-16 RX ORDER — BUPIVACAINE HYDROCHLORIDE AND EPINEPHRINE 5; 5 MG/ML; UG/ML
INJECTION, SOLUTION PERINEURAL
Status: DISCONTINUED | OUTPATIENT
Start: 2021-04-16 | End: 2021-04-16 | Stop reason: HOSPADM

## 2021-04-16 RX ORDER — ONDANSETRON 2 MG/ML
INJECTION INTRAMUSCULAR; INTRAVENOUS PRN
Status: DISCONTINUED | OUTPATIENT
Start: 2021-04-16 | End: 2021-04-16 | Stop reason: SURG

## 2021-04-16 RX ORDER — OXYCODONE HCL 5 MG/5 ML
10 SOLUTION, ORAL ORAL
Status: DISCONTINUED | OUTPATIENT
Start: 2021-04-16 | End: 2021-04-21 | Stop reason: HOSPADM

## 2021-04-16 RX ORDER — OXYCODONE HCL 5 MG/5 ML
5 SOLUTION, ORAL ORAL
Status: COMPLETED | OUTPATIENT
Start: 2021-04-16 | End: 2021-04-16

## 2021-04-16 RX ORDER — ACETAMINOPHEN 500 MG
1000 TABLET ORAL EVERY 6 HOURS
Status: DISPENSED | OUTPATIENT
Start: 2021-04-17 | End: 2021-04-21

## 2021-04-16 RX ORDER — SODIUM CHLORIDE, SODIUM LACTATE, POTASSIUM CHLORIDE, CALCIUM CHLORIDE 600; 310; 30; 20 MG/100ML; MG/100ML; MG/100ML; MG/100ML
INJECTION, SOLUTION INTRAVENOUS CONTINUOUS
Status: DISCONTINUED | OUTPATIENT
Start: 2021-04-16 | End: 2021-04-16 | Stop reason: HOSPADM

## 2021-04-16 RX ORDER — CEFAZOLIN SODIUM 1 G/3ML
INJECTION, POWDER, FOR SOLUTION INTRAMUSCULAR; INTRAVENOUS PRN
Status: DISCONTINUED | OUTPATIENT
Start: 2021-04-16 | End: 2021-04-16 | Stop reason: SURG

## 2021-04-16 RX ORDER — GABAPENTIN 300 MG/1
300 CAPSULE ORAL ONCE
Status: COMPLETED | OUTPATIENT
Start: 2021-04-16 | End: 2021-04-16

## 2021-04-16 RX ORDER — DIPHENHYDRAMINE HYDROCHLORIDE 50 MG/ML
12.5 INJECTION INTRAMUSCULAR; INTRAVENOUS
Status: DISCONTINUED | OUTPATIENT
Start: 2021-04-16 | End: 2021-04-16 | Stop reason: HOSPADM

## 2021-04-16 RX ORDER — HYDROMORPHONE HYDROCHLORIDE 1 MG/ML
0.2 INJECTION, SOLUTION INTRAMUSCULAR; INTRAVENOUS; SUBCUTANEOUS
Status: DISCONTINUED | OUTPATIENT
Start: 2021-04-16 | End: 2021-04-16 | Stop reason: HOSPADM

## 2021-04-16 RX ORDER — ONDANSETRON 2 MG/ML
4 INJECTION INTRAMUSCULAR; INTRAVENOUS EVERY 4 HOURS PRN
Status: DISCONTINUED | OUTPATIENT
Start: 2021-04-16 | End: 2021-04-21 | Stop reason: HOSPADM

## 2021-04-16 RX ADMIN — GABAPENTIN 300 MG: 300 CAPSULE ORAL at 09:41

## 2021-04-16 RX ADMIN — SODIUM CHLORIDE, POTASSIUM CHLORIDE, SODIUM LACTATE AND CALCIUM CHLORIDE: 600; 310; 30; 20 INJECTION, SOLUTION INTRAVENOUS at 11:03

## 2021-04-16 RX ADMIN — PROPOFOL 150 MG: 10 INJECTION, EMULSION INTRAVENOUS at 11:15

## 2021-04-16 RX ADMIN — FENTANYL CITRATE 25 MCG: 50 INJECTION, SOLUTION INTRAMUSCULAR; INTRAVENOUS at 12:24

## 2021-04-16 RX ADMIN — ONDANSETRON 4 MG: 2 INJECTION INTRAMUSCULAR; INTRAVENOUS at 12:14

## 2021-04-16 RX ADMIN — HYDROMORPHONE HYDROCHLORIDE 0.2 MG: 1 INJECTION, SOLUTION INTRAMUSCULAR; INTRAVENOUS; SUBCUTANEOUS at 14:26

## 2021-04-16 RX ADMIN — FAMOTIDINE 20 MG: 10 INJECTION INTRAVENOUS at 18:14

## 2021-04-16 RX ADMIN — FENTANYL CITRATE 25 MCG: 50 INJECTION, SOLUTION INTRAMUSCULAR; INTRAVENOUS at 12:26

## 2021-04-16 RX ADMIN — ONDANSETRON 8 MG: 2 INJECTION INTRAMUSCULAR; INTRAVENOUS at 11:03

## 2021-04-16 RX ADMIN — OXYCODONE HYDROCHLORIDE 10 MG: 5 SOLUTION ORAL at 15:23

## 2021-04-16 RX ADMIN — POVIDONE IODINE 15 ML: 100 SOLUTION TOPICAL at 09:42

## 2021-04-16 RX ADMIN — FENTANYL CITRATE 50 MCG: 50 INJECTION, SOLUTION INTRAMUSCULAR; INTRAVENOUS at 15:24

## 2021-04-16 RX ADMIN — FENTANYL CITRATE 50 MCG: 50 INJECTION, SOLUTION INTRAMUSCULAR; INTRAVENOUS at 12:14

## 2021-04-16 RX ADMIN — LIDOCAINE HYDROCHLORIDE 0.5 ML: 10 INJECTION, SOLUTION EPIDURAL; INFILTRATION; INTRACAUDAL at 09:41

## 2021-04-16 RX ADMIN — POTASSIUM CHLORIDE: 149 INJECTION, SOLUTION, CONCENTRATE INTRAVENOUS at 18:01

## 2021-04-16 RX ADMIN — MIDAZOLAM 2 MG: 1 INJECTION INTRAMUSCULAR; INTRAVENOUS at 11:03

## 2021-04-16 RX ADMIN — HYDROMORPHONE HYDROCHLORIDE 0.4 MG: 1 INJECTION, SOLUTION INTRAMUSCULAR; INTRAVENOUS; SUBCUTANEOUS at 13:21

## 2021-04-16 RX ADMIN — HALOPERIDOL LACTATE 1 MG: 5 INJECTION, SOLUTION INTRAMUSCULAR at 12:40

## 2021-04-16 RX ADMIN — GABAPENTIN 300 MG: 300 CAPSULE ORAL at 18:04

## 2021-04-16 RX ADMIN — CHLORTHALIDONE 25 MG: 25 TABLET ORAL at 18:04

## 2021-04-16 RX ADMIN — CEFAZOLIN 2 G: 330 INJECTION, POWDER, FOR SOLUTION INTRAMUSCULAR; INTRAVENOUS at 11:03

## 2021-04-16 RX ADMIN — OXYCODONE HYDROCHLORIDE 10 MG: 5 SOLUTION ORAL at 18:18

## 2021-04-16 RX ADMIN — FENTANYL CITRATE 25 MCG: 50 INJECTION, SOLUTION INTRAMUSCULAR; INTRAVENOUS at 15:37

## 2021-04-16 RX ADMIN — OXYCODONE HYDROCHLORIDE 10 MG: 10 TABLET, FILM COATED, EXTENDED RELEASE ORAL at 09:41

## 2021-04-16 RX ADMIN — HYDROMORPHONE HYDROCHLORIDE 0.4 MG: 1 INJECTION, SOLUTION INTRAMUSCULAR; INTRAVENOUS; SUBCUTANEOUS at 12:47

## 2021-04-16 RX ADMIN — SODIUM CHLORIDE, POTASSIUM CHLORIDE, SODIUM LACTATE AND CALCIUM CHLORIDE: 600; 310; 30; 20 INJECTION, SOLUTION INTRAVENOUS at 12:22

## 2021-04-16 RX ADMIN — ACETAMINOPHEN 1 G: 10 INJECTION, SOLUTION INTRAVENOUS at 09:40

## 2021-04-16 RX ADMIN — LAMOTRIGINE 150 MG: 100 TABLET ORAL at 18:05

## 2021-04-16 RX ADMIN — DEXAMETHASONE SODIUM PHOSPHATE 8 MG: 4 INJECTION, SOLUTION INTRA-ARTICULAR; INTRALESIONAL; INTRAMUSCULAR; INTRAVENOUS; SOFT TISSUE at 11:03

## 2021-04-16 RX ADMIN — SODIUM CHLORIDE, POTASSIUM CHLORIDE, SODIUM LACTATE AND CALCIUM CHLORIDE: 600; 310; 30; 20 INJECTION, SOLUTION INTRAVENOUS at 09:40

## 2021-04-16 RX ADMIN — OXYCODONE HYDROCHLORIDE 10 MG: 5 SOLUTION ORAL at 22:45

## 2021-04-16 RX ADMIN — ONDANSETRON 4 MG: 2 INJECTION INTRAMUSCULAR; INTRAVENOUS at 22:45

## 2021-04-16 RX ADMIN — INSULIN HUMAN 3 UNITS: 100 INJECTION, SOLUTION PARENTERAL at 18:21

## 2021-04-16 RX ADMIN — ACETAMINOPHEN 1000 MG: 10 INJECTION, SOLUTION INTRAVENOUS at 21:54

## 2021-04-16 RX ADMIN — ONDANSETRON 4 MG: 2 INJECTION INTRAMUSCULAR; INTRAVENOUS at 18:13

## 2021-04-16 RX ADMIN — HALOPERIDOL LACTATE 1 MG: 5 INJECTION, SOLUTION INTRAMUSCULAR at 12:18

## 2021-04-16 RX ADMIN — LORAZEPAM 0.5 MG: 2 INJECTION INTRAMUSCULAR; INTRAVENOUS at 12:58

## 2021-04-16 ASSESSMENT — FIBROSIS 4 INDEX: FIB4 SCORE: 0.38

## 2021-04-16 ASSESSMENT — PAIN DESCRIPTION - PAIN TYPE
TYPE: SURGICAL PAIN
TYPE: ACUTE PAIN;SURGICAL PAIN
TYPE: SURGICAL PAIN

## 2021-04-16 ASSESSMENT — PAIN SCALES - GENERAL: PAIN_LEVEL: 1

## 2021-04-16 NOTE — ANESTHESIA PREPROCEDURE EVALUATION
Relevant Problems   PULMONARY   (+) Uncomplicated asthma      CARDIAC   (+) Essential hypertension      GI   (+) Gastroesophageal reflux disease      ENDO   (+) Hyperthyroidism   (+) Hypothyroidism       Physical Exam    Airway   Mallampati: II  TM distance: >3 FB  Neck ROM: full       Cardiovascular - normal exam  Rhythm: regular  Rate: normal  (-) murmur     Dental - normal exam           Pulmonary - normal exam  Breath sounds clear to auscultation     Abdominal    Neurological - normal exam                 Anesthesia Plan    ASA 2       Plan - general       Airway plan will be ETT          Induction: intravenous    Postoperative Plan: Postoperative administration of opioids is intended.    Pertinent diagnostic labs and testing reviewed    Informed Consent:    Anesthetic plan and risks discussed with patient.    Use of blood products discussed with: patient whom consented to blood products.

## 2021-04-16 NOTE — ANESTHESIA TIME REPORT
Anesthesia Start and Stop Event Times     Date Time Event    4/16/2021 1057 Ready for Procedure     1103 Anesthesia Start     1218 Anesthesia Stop        Responsible Staff  04/16/21    Name Role Begin End    Saul Trujillo M.D. Anesth 1103 1218        Preop Diagnosis (Free Text):  Pre-op Diagnosis     MORBID OBESITY        Preop Diagnosis (Codes):    Post op Diagnosis  Morbid obesity (HCC)      Premium Reason  Non-Premium    Comments:

## 2021-04-16 NOTE — ANESTHESIA POSTPROCEDURE EVALUATION
Patient: Margareth SMITH    Procedure Summary     Date: 04/16/21 Room / Location: [place] [address] / SURGERY [facility]    Anesthesia Start: 1103 Anesthesia Stop: 1218    Procedures:       GASTRECTOMY, SLEEVE, LAPAROSCOPIC (Abdomen)      BIOPSY, LIVER, LAPAROSCOPIC. (Abdomen) Diagnosis: (MORBID OBESITY)    Surgeons: Houston Pickett M.D. Responsible Provider: Saul Trujillo M.D.    Anesthesia Type: general ASA Status: 2          Final Anesthesia Type: general  Last vitals  BP   Blood Pressure: 126/85    Temp   36.6 °C (97.8 °F)    Pulse   88   Resp   19    SpO2   97 %      Anesthesia Post Evaluation    Patient location during evaluation: PACU  Patient participation: complete - patient participated  Level of consciousness: awake and alert  Pain score: 1    Airway patency: patent  Anesthetic complications: no  Cardiovascular status: hemodynamically stable  Respiratory status: acceptable  Hydration status: euvolemic    PONV: none          No complications documented.

## 2021-04-16 NOTE — OP REPORT
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  Laparoscopic Sleeve Gastrectomy  2.  Left Lobe Liver Biopsy     SURGEON: Houston Pickett MD    ASSISTANT:  JIMI Thompson PA-C, PA-C    ANESTHESIOLOGIST:  Anesthesiologist: Saul Trujillo M.D.    ANESTHESIA: General endotracheal anesthesia.     SPECIMEN: Stomach; Liver Biopsy    ESTIMATED BLOOD LOSS: <10cc.     INDICATIONS: The patient is a 50 y.o. female with a diagnosis of morbid obesity with medical sequelae. She is taken to the operating room today for Laparoscopic Sleeve Gastrectomy and liver biopsy.     PROCEDURE: Following informed consent, the patient was properly identified, taken to the operating room, and placed in the supine position where general endotracheal anesthesia was administered. Intravenous antibiotics were administered by the anesthesiologist in the correct time interval. Sequential compression devices were employed. The abdomen was prepped and draped into a sterile field.     An optical entry bladeless  trocar was utilized and pneumoperitoneum carefully established in the usual fashion.  The bladeless 5 mm separator trocar was introduced and the 5 mm lens/camera was passed into the peritoneal cavity.  Three additional separator trocars were placed under direct vision.  A 5 mm Ankit-type liver retractor was placed into position.  This was used to elevate the left sided segment of the liver.  It was secured to the patients right side with a robot arm.  Careful inspection revealed no untoward events with placement of the trocars.    The gastrocolic omentum was examined and dissected with the ligasure device and a point on  the distal antrum was selected to begin the sleeve gastrectomy.  A 40 Yemeni bougie was then passed down into the antrum.  A careful inspection at the hiatus demonstrated no significant hiatal hernia which would require repair or risk significant reflux. A [employer] linear stapler with a thick-tissue cartridges, was employed to divide partway across the stomach.  With the bougie in position, the stapler was then used to march proximally along the stomach and transsection of the stomach was performed, beginning 5 cm proximal to the pylorus in the method of the sleeve gastrectomy.  The greater curvature aspect of the stomach was then dissected and the greater curvature vessels and short gastric vessels were divided with the ligasure.      The last endomechanical stapler firings were used to complete the transection of the stomach.  Hemoclips were used if any site exhibited oozing. Careful inspection of the staple line demonstrated excellent, meticulous hemostasis and a completely intact staple line with seemless tissue approximation.  Seromuscular sutures were placed using polysorb suture to further secure the staple line.  The liver exhibited hepatic steatosis and a left lateral segment lobular likely hemangioma.  A [place] liver biopsy was obtained from the left lobe of the liver and sent for pathology.  Hemostasis on the liver was achieved with cautery and Floseal. The bougie was then removed.     The large endocatch bag was then used to retrieve the stomach specimen.  Tisseal fibrin glue sealant was sprayed along the entire staple line. The ports were removed under direct vision and the pneumoperitoneum was allowed to escape. The fascia of this port was closed with 0-Vicryl suture.  The port sites were then irrigated well.  The port site skin incisions were closed with interrupted 4-0 Vicryl subcuticular sutures.  Steri-Strips and Benzoin were applied beneath sterile Band-Aids.     The patient tolerated the procedure  well and there were no apparent complications. All sponge, needle, and instrument counts were correct on 2 separate occasions. She was awakened, extubated, and transferred to the recovery room in satisfactory condition.       ____________________________________   Houston Pickett MD  DD: 4/16/2021  12:29 PM    CC:  [facility];

## 2021-04-16 NOTE — OR NURSING
1206- Pt arrives to PACU from OR on 6L of oxygen via mask with OPA in place. Report received. Bandaids to abdomen lap stabs x4, CDI. Ice pack applied.     1209- OPA removed, pt states pain 7/10 and nausea, medicated per MAR.     1300- Pt friend Ruby called and updated on pt status and POC.     1600- Report called to Alexandr CALLE. Signed and held orders discussed.     1620- Pt taken to room by this RN. Bedside handoff given to Alexandr CALLE, pt transported on 6L of oxygen with full tank.

## 2021-04-16 NOTE — ANESTHESIA PROCEDURE NOTES
Airway    Date/Time: 4/16/2021 11:15 AM  Performed by: Saul Trujillo M.D.  Authorized by: Saul Trujillo M.D.     Location:  OR  Urgency:  Elective  Indications for Airway Management:  Anesthesia      Spontaneous Ventilation: absent    Sedation Level:  Deep  Preoxygenated: Yes    Patient Position:  Sniffing  Mask Difficulty Assessment:  0 - not attempted  Final Airway Type:  Endotracheal airway  Final Endotracheal Airway:  ETT  Cuffed: Yes    Technique Used for Successful ETT Placement:  Direct laryngoscopy    Insertion Site:  Oral  Blade Type:  Marr  Laryngoscope Blade/Videolaryngoscope Blade Size:  2  ETT Size (mm):  7.5  Measured from:  Teeth  ETT to Teeth (cm):  22  Placement Verified by: auscultation and capnometry    Cormack-Lehane Classification:  Grade IIa - partial view of glottis  Number of Attempts at Approach:  1

## 2021-04-17 LAB
ALBUMIN SERPL BCP-MCNC: 4 G/DL (ref 3.2–4.9)
ALBUMIN/GLOB SERPL: 1.5 G/DL
ALP SERPL-CCNC: 43 U/L (ref 30–99)
ALT SERPL-CCNC: 36 U/L (ref 2–50)
ANION GAP SERPL CALC-SCNC: 5 MMOL/L (ref 7–16)
AST SERPL-CCNC: 40 U/L (ref 12–45)
BILIRUB SERPL-MCNC: 0.5 MG/DL (ref 0.1–1.5)
BUN SERPL-MCNC: 11 MG/DL (ref 8–22)
CALCIUM SERPL-MCNC: 8.8 MG/DL (ref 8.5–10.5)
CHLORIDE SERPL-SCNC: 98 MMOL/L (ref 96–112)
CO2 SERPL-SCNC: 31 MMOL/L (ref 20–33)
CREAT SERPL-MCNC: 0.82 MG/DL (ref 0.5–1.4)
ERYTHROCYTE [DISTWIDTH] IN BLOOD BY AUTOMATED COUNT: 44.2 FL (ref 35.9–50)
GLOBULIN SER CALC-MCNC: 2.6 G/DL (ref 1.9–3.5)
GLUCOSE BLD-MCNC: 118 MG/DL (ref 65–99)
GLUCOSE BLD-MCNC: 142 MG/DL (ref 65–99)
GLUCOSE BLD-MCNC: 86 MG/DL (ref 65–99)
GLUCOSE BLD-MCNC: 90 MG/DL (ref 65–99)
GLUCOSE BLD-MCNC: 92 MG/DL (ref 65–99)
GLUCOSE SERPL-MCNC: 100 MG/DL (ref 65–99)
HCT VFR BLD AUTO: 41.8 % (ref 37–47)
HGB BLD-MCNC: 14.2 G/DL (ref 12–16)
MCH RBC QN AUTO: 32.8 PG (ref 27–33)
MCHC RBC AUTO-ENTMCNC: 34 G/DL (ref 33.6–35)
MCV RBC AUTO: 96.5 FL (ref 81.4–97.8)
PLATELET # BLD AUTO: 297 K/UL (ref 164–446)
PMV BLD AUTO: 10.1 FL (ref 9–12.9)
POTASSIUM SERPL-SCNC: 3.8 MMOL/L (ref 3.6–5.5)
PROT SERPL-MCNC: 6.6 G/DL (ref 6–8.2)
RBC # BLD AUTO: 4.33 M/UL (ref 4.2–5.4)
SODIUM SERPL-SCNC: 134 MMOL/L (ref 135–145)
WBC # BLD AUTO: 18.2 K/UL (ref 4.8–10.8)

## 2021-04-17 PROCEDURE — 700102 HCHG RX REV CODE 250 W/ 637 OVERRIDE(OP): Performed by: SURGERY

## 2021-04-17 PROCEDURE — 700105 HCHG RX REV CODE 258: Performed by: PHYSICIAN ASSISTANT

## 2021-04-17 PROCEDURE — 85027 COMPLETE CBC AUTOMATED: CPT

## 2021-04-17 PROCEDURE — 700102 HCHG RX REV CODE 250 W/ 637 OVERRIDE(OP): Performed by: PHYSICIAN ASSISTANT

## 2021-04-17 PROCEDURE — 36415 COLL VENOUS BLD VENIPUNCTURE: CPT

## 2021-04-17 PROCEDURE — 700101 HCHG RX REV CODE 250: Performed by: COLON & RECTAL SURGERY

## 2021-04-17 PROCEDURE — 94640 AIRWAY INHALATION TREATMENT: CPT

## 2021-04-17 PROCEDURE — A9270 NON-COVERED ITEM OR SERVICE: HCPCS | Performed by: PHYSICIAN ASSISTANT

## 2021-04-17 PROCEDURE — 770001 HCHG ROOM/CARE - MED/SURG/GYN PRIV*

## 2021-04-17 PROCEDURE — 82962 GLUCOSE BLOOD TEST: CPT | Mod: 91

## 2021-04-17 PROCEDURE — A9270 NON-COVERED ITEM OR SERVICE: HCPCS | Performed by: SURGERY

## 2021-04-17 PROCEDURE — 80053 COMPREHEN METABOLIC PANEL: CPT

## 2021-04-17 PROCEDURE — 700111 HCHG RX REV CODE 636 W/ 250 OVERRIDE (IP): Performed by: PHYSICIAN ASSISTANT

## 2021-04-17 RX ORDER — POLYETHYLENE GLYCOL 3350 17 G/17G
1 POWDER, FOR SOLUTION ORAL
Status: DISCONTINUED | OUTPATIENT
Start: 2021-04-17 | End: 2021-04-21 | Stop reason: HOSPADM

## 2021-04-17 RX ADMIN — OXYCODONE HYDROCHLORIDE 10 MG: 5 SOLUTION ORAL at 06:22

## 2021-04-17 RX ADMIN — BENZOCAINE AND MENTHOL 1 LOZENGE: 15; 3.6 LOZENGE ORAL at 23:20

## 2021-04-17 RX ADMIN — GABAPENTIN 300 MG: 300 CAPSULE ORAL at 16:31

## 2021-04-17 RX ADMIN — ACETAMINOPHEN 1000 MG: 500 TABLET ORAL at 09:31

## 2021-04-17 RX ADMIN — FAMOTIDINE 20 MG: 10 INJECTION INTRAVENOUS at 06:21

## 2021-04-17 RX ADMIN — OXYCODONE HYDROCHLORIDE 10 MG: 5 SOLUTION ORAL at 09:36

## 2021-04-17 RX ADMIN — ACETAMINOPHEN 1000 MG: 10 INJECTION, SOLUTION INTRAVENOUS at 04:45

## 2021-04-17 RX ADMIN — ENOXAPARIN SODIUM 40 MG: 40 INJECTION SUBCUTANEOUS at 16:31

## 2021-04-17 RX ADMIN — ALBUTEROL SULFATE 2.5 MG: 2.5 SOLUTION RESPIRATORY (INHALATION) at 20:43

## 2021-04-17 RX ADMIN — POTASSIUM CHLORIDE: 149 INJECTION, SOLUTION, CONCENTRATE INTRAVENOUS at 18:41

## 2021-04-17 RX ADMIN — GABAPENTIN 300 MG: 300 CAPSULE ORAL at 13:10

## 2021-04-17 RX ADMIN — ACETAMINOPHEN 1000 MG: 500 TABLET ORAL at 23:21

## 2021-04-17 RX ADMIN — SIMETHICONE 80 MG: 80 TABLET, CHEWABLE ORAL at 14:22

## 2021-04-17 RX ADMIN — ACETAMINOPHEN 1000 MG: 500 TABLET ORAL at 16:31

## 2021-04-17 RX ADMIN — FAMOTIDINE 20 MG: 10 INJECTION INTRAVENOUS at 16:32

## 2021-04-17 RX ADMIN — GABAPENTIN 300 MG: 300 CAPSULE ORAL at 06:21

## 2021-04-17 RX ADMIN — OXYCODONE HYDROCHLORIDE 10 MG: 5 SOLUTION ORAL at 14:17

## 2021-04-17 RX ADMIN — POTASSIUM CHLORIDE: 149 INJECTION, SOLUTION, CONCENTRATE INTRAVENOUS at 00:53

## 2021-04-17 RX ADMIN — LAMOTRIGINE 150 MG: 100 TABLET ORAL at 06:21

## 2021-04-17 RX ADMIN — ONDANSETRON 4 MG: 2 INJECTION INTRAMUSCULAR; INTRAVENOUS at 14:17

## 2021-04-17 RX ADMIN — POTASSIUM CHLORIDE: 149 INJECTION, SOLUTION, CONCENTRATE INTRAVENOUS at 09:26

## 2021-04-17 RX ADMIN — OXYCODONE HYDROCHLORIDE 5 MG: 5 SOLUTION ORAL at 18:50

## 2021-04-17 RX ADMIN — POLYETHYLENE GLYCOL 3350 1 PACKET: 17 POWDER, FOR SOLUTION ORAL at 14:21

## 2021-04-17 ASSESSMENT — ENCOUNTER SYMPTOMS
NAUSEA: 1
COUGH: 0
VOMITING: 0
ABDOMINAL PAIN: 1
FEVER: 0
DIZZINESS: 0
CHILLS: 0

## 2021-04-17 ASSESSMENT — COPD QUESTIONNAIRES
COPD SCREENING SCORE: 2
DO YOU EVER COUGH UP ANY MUCUS OR PHLEGM?: NO/ONLY WITH OCCASIONAL COLDS OR INFECTIONS
DURING THE PAST 4 WEEKS HOW MUCH DID YOU FEEL SHORT OF BREATH: SOME OF THE TIME
HAVE YOU SMOKED AT LEAST 100 CIGARETTES IN YOUR ENTIRE LIFE: NO/DON'T KNOW

## 2021-04-17 ASSESSMENT — PAIN DESCRIPTION - PAIN TYPE
TYPE: SURGICAL PAIN

## 2021-04-17 NOTE — CARE PLAN
Problem: Safety  Goal: Will remain free from injury  Outcome: PROGRESSING AS EXPECTED  Call light in reach. Pt educated to call for assistance. Bed in lowest and locked position.      Problem: Pain Management  Goal: Pain level will decrease to patient's comfort goal  Outcome: PROGRESSING AS EXPECTED  Pain assessed using verbal and nonverbal scales. PRN pain medication given as needed and as ordered.      Problem: Mobility  Goal: Risk for activity intolerance will decrease  Outcome: PROGRESSING AS EXPECTED  Mobility Progress Note    Surgery patient: Yes  Date of surgery: 4/16/2021  Ambulated 50 ft on day of surgery? (N/A if patient did not undergo surgery today): Yes  Number of times ambulated 50 feet or greater today: Six  Patient has been up to chair, edge of bed or HOB 90 degrees for all meals?: Unknown  Goal met? (goal is ambulating at least 50 feet 2 times on day shift, one time on night shift): Yes  If patient did not meet mobility goal, why?: Goal was met

## 2021-04-17 NOTE — PROGRESS NOTES
2 RN skin check    2 RN skin check complete.   Devices in place SCDs, pulse ox,  abd binder.  Skin assessed under devices Yes.  Confirmed pressure ulcers found on Na.  New potential pressure ulcers noted on Na. Wound consult placed Na.   Surgical incision noted to  Mid Upper Abd wall,   The following interventions in place, patient up self.

## 2021-04-17 NOTE — PROGRESS NOTES
Surgical Progress Note    Author: Raphael Thompson P.A.-C. Date & Time created: 2021   7:46 AM     Interval Events:  50 year old female POD #1 Laparoscopic Sleeve Gastrectomy and Left Lobe Liver Biopsy progressing well. VSS, waiting labs for today.  Tolerating clears without nausea/vomiting. Admits to typical incisional abdominal pain, controlled with medication.  Currently on RA 02.  Pt is ambulating and voiding.  Patient is requesting to stay an additional, will re-evaluate later today.      Review of Systems   Constitutional: Negative for chills and fever.   Respiratory: Negative for cough.    Cardiovascular: Negative for chest pain.   Gastrointestinal: Positive for abdominal pain and nausea. Negative for vomiting.   Skin: Negative for rash.   Neurological: Negative for dizziness.     Hemodynamics:  Temp (24hrs), Av.6 °C (97.8 °F), Min:36.1 °C (96.9 °F), Max:37.5 °C (99.5 °F)  Temperature: 37.5 °C (99.5 °F)  Pulse  Av.4  Min: 70  Max: 115   Blood Pressure: 104/65     Respiratory:    Respiration: 16, Pulse Oximetry: 90 %           Neuro:  GCS       Fluids:    Intake/Output Summary (Last 24 hours) at 2021 0746  Last data filed at 2021 0400  Gross per 24 hour   Intake 1000 ml   Output 20 ml   Net 980 ml     Weight: 90.9 kg (200 lb 6.4 oz)  Current Diet Order   Procedures   • Diet Order Diet: Clear Liquid (Sips with meds and ice chips okay on the day of surgery. ); Miscellaneous modifications: (optional): Bariatric     Physical Exam  Constitutional:       Appearance: Normal appearance. She is obese.   HENT:      Head: Normocephalic and atraumatic.      Mouth/Throat:      Mouth: Mucous membranes are moist.   Eyes:      Extraocular Movements: Extraocular movements intact.   Cardiovascular:      Rate and Rhythm: Normal rate and regular rhythm.   Pulmonary:      Effort: Pulmonary effort is normal.   Abdominal:      Palpations: Abdomen is soft. There is no mass.      Tenderness: There is abdominal  tenderness.      Hernia: No hernia is present.   Musculoskeletal:      Cervical back: Normal range of motion.   Skin:     General: Skin is warm and dry.   Neurological:      General: No focal deficit present.      Mental Status: She is alert.       Labs:  Recent Results (from the past 24 hour(s))   Histology Request    Collection Time: 04/16/21 12:03 PM   Result Value Ref Range    Pathology Request Sent to Histo    POCT glucose device results    Collection Time: 04/16/21  6:21 PM   Result Value Ref Range    Glucose - Accu-Ck 152 (H) 65 - 99 mg/dL   POCT glucose device results    Collection Time: 04/17/21 12:26 AM   Result Value Ref Range    Glucose - Accu-Ck 142 (H) 65 - 99 mg/dL   POCT glucose device results    Collection Time: 04/17/21  6:20 AM   Result Value Ref Range    Glucose - Accu-Ck 118 (H) 65 - 99 mg/dL     Medical Decision Making, by Problem:  There are no active hospital problems to display for this patient.    Plan:  Pt is alert and oriented, NAD. Breathing unlabored. Tolerating PO.  Incisions ok. VS stable. Waiting labs.  Encouraged ambulation and incentive spirometry.  Currently on room air. Pt requesting to stay another night,  will see how the day progresses. Pt also seen and examined by Dr. Pickett.    Quality Measures:  Quality-Core Measures   Reviewed items::  Labs reviewed  Salinas catheter::  No Salinas  DVT prophylaxis pharmacological::  Enoxaparin (Lovenox)  DVT prophylaxis - mechanical:  SCDs  Ulcer Prophylaxis::  Yes      Discussed patient condition with Patient and Dr. Pickett

## 2021-04-18 ENCOUNTER — APPOINTMENT (OUTPATIENT)
Dept: RADIOLOGY | Facility: MEDICAL CENTER | Age: 51
DRG: 619 | End: 2021-04-18
Attending: PHYSICIAN ASSISTANT
Payer: COMMERCIAL

## 2021-04-18 LAB
ALBUMIN SERPL BCP-MCNC: 3.6 G/DL (ref 3.2–4.9)
ALBUMIN/GLOB SERPL: 1.4 G/DL
ALP SERPL-CCNC: 40 U/L (ref 30–99)
ALT SERPL-CCNC: 30 U/L (ref 2–50)
ANION GAP SERPL CALC-SCNC: 6 MMOL/L (ref 7–16)
AST SERPL-CCNC: 28 U/L (ref 12–45)
BILIRUB SERPL-MCNC: 0.5 MG/DL (ref 0.1–1.5)
BUN SERPL-MCNC: 9 MG/DL (ref 8–22)
CALCIUM SERPL-MCNC: 8.4 MG/DL (ref 8.5–10.5)
CHLORIDE SERPL-SCNC: 98 MMOL/L (ref 96–112)
CO2 SERPL-SCNC: 30 MMOL/L (ref 20–33)
CREAT SERPL-MCNC: 0.73 MG/DL (ref 0.5–1.4)
ERYTHROCYTE [DISTWIDTH] IN BLOOD BY AUTOMATED COUNT: 46.5 FL (ref 35.9–50)
GLOBULIN SER CALC-MCNC: 2.6 G/DL (ref 1.9–3.5)
GLUCOSE BLD-MCNC: 72 MG/DL (ref 65–99)
GLUCOSE BLD-MCNC: 82 MG/DL (ref 65–99)
GLUCOSE BLD-MCNC: 88 MG/DL (ref 65–99)
GLUCOSE BLD-MCNC: 94 MG/DL (ref 65–99)
GLUCOSE SERPL-MCNC: 103 MG/DL (ref 65–99)
HCT VFR BLD AUTO: 40.5 % (ref 37–47)
HGB BLD-MCNC: 13.2 G/DL (ref 12–16)
MCH RBC QN AUTO: 32.4 PG (ref 27–33)
MCHC RBC AUTO-ENTMCNC: 32.6 G/DL (ref 33.6–35)
MCV RBC AUTO: 99.5 FL (ref 81.4–97.8)
PLATELET # BLD AUTO: 260 K/UL (ref 164–446)
PMV BLD AUTO: 10.1 FL (ref 9–12.9)
POTASSIUM SERPL-SCNC: 3.6 MMOL/L (ref 3.6–5.5)
PROT SERPL-MCNC: 6.2 G/DL (ref 6–8.2)
RBC # BLD AUTO: 4.07 M/UL (ref 4.2–5.4)
SARS-COV-2 RNA RESP QL NAA+PROBE: NOTDETECTED
SODIUM SERPL-SCNC: 134 MMOL/L (ref 135–145)
SPECIMEN SOURCE: NORMAL
WBC # BLD AUTO: 10.8 K/UL (ref 4.8–10.8)

## 2021-04-18 PROCEDURE — 700101 HCHG RX REV CODE 250: Performed by: COLON & RECTAL SURGERY

## 2021-04-18 PROCEDURE — 71046 X-RAY EXAM CHEST 2 VIEWS: CPT

## 2021-04-18 PROCEDURE — U0003 INFECTIOUS AGENT DETECTION BY NUCLEIC ACID (DNA OR RNA); SEVERE ACUTE RESPIRATORY SYNDROME CORONAVIRUS 2 (SARS-COV-2) (CORONAVIRUS DISEASE [COVID-19]), AMPLIFIED PROBE TECHNIQUE, MAKING USE OF HIGH THROUGHPUT TECHNOLOGIES AS DESCRIBED BY CMS-2020-01-R: HCPCS

## 2021-04-18 PROCEDURE — 700111 HCHG RX REV CODE 636 W/ 250 OVERRIDE (IP): Performed by: PHYSICIAN ASSISTANT

## 2021-04-18 PROCEDURE — U0005 INFEC AGEN DETEC AMPLI PROBE: HCPCS

## 2021-04-18 PROCEDURE — A9270 NON-COVERED ITEM OR SERVICE: HCPCS | Performed by: SURGERY

## 2021-04-18 PROCEDURE — 700102 HCHG RX REV CODE 250 W/ 637 OVERRIDE(OP): Performed by: PHYSICIAN ASSISTANT

## 2021-04-18 PROCEDURE — 36415 COLL VENOUS BLD VENIPUNCTURE: CPT

## 2021-04-18 PROCEDURE — 82962 GLUCOSE BLOOD TEST: CPT | Mod: 91

## 2021-04-18 PROCEDURE — 700102 HCHG RX REV CODE 250 W/ 637 OVERRIDE(OP): Performed by: SURGERY

## 2021-04-18 PROCEDURE — 85027 COMPLETE CBC AUTOMATED: CPT

## 2021-04-18 PROCEDURE — 80053 COMPREHEN METABOLIC PANEL: CPT

## 2021-04-18 PROCEDURE — 700105 HCHG RX REV CODE 258: Performed by: PHYSICIAN ASSISTANT

## 2021-04-18 PROCEDURE — 770001 HCHG ROOM/CARE - MED/SURG/GYN PRIV*

## 2021-04-18 PROCEDURE — A9270 NON-COVERED ITEM OR SERVICE: HCPCS | Performed by: PHYSICIAN ASSISTANT

## 2021-04-18 PROCEDURE — 94640 AIRWAY INHALATION TREATMENT: CPT

## 2021-04-18 RX ORDER — SENNOSIDES A AND B 8.6 MG/1
8.6 TABLET, FILM COATED ORAL 2 TIMES DAILY PRN
Status: DISCONTINUED | OUTPATIENT
Start: 2021-04-18 | End: 2021-04-20

## 2021-04-18 RX ORDER — AMOXICILLIN AND CLAVULANATE POTASSIUM 600; 42.9 MG/5ML; MG/5ML
500 POWDER, FOR SUSPENSION ORAL EVERY 12 HOURS
Status: DISCONTINUED | OUTPATIENT
Start: 2021-04-18 | End: 2021-04-18

## 2021-04-18 RX ADMIN — OXYCODONE HYDROCHLORIDE 10 MG: 5 SOLUTION ORAL at 05:27

## 2021-04-18 RX ADMIN — ACETAMINOPHEN 1000 MG: 500 TABLET ORAL at 17:24

## 2021-04-18 RX ADMIN — SIMETHICONE 80 MG: 80 TABLET, CHEWABLE ORAL at 10:33

## 2021-04-18 RX ADMIN — POTASSIUM CHLORIDE: 149 INJECTION, SOLUTION, CONCENTRATE INTRAVENOUS at 23:51

## 2021-04-18 RX ADMIN — PROMETHAZINE HYDROCHLORIDE 25 MG: 25 SUPPOSITORY RECTAL at 12:33

## 2021-04-18 RX ADMIN — POTASSIUM CHLORIDE: 149 INJECTION, SOLUTION, CONCENTRATE INTRAVENOUS at 12:15

## 2021-04-18 RX ADMIN — OXYCODONE HYDROCHLORIDE 10 MG: 5 SOLUTION ORAL at 09:27

## 2021-04-18 RX ADMIN — HYDROMORPHONE HYDROCHLORIDE 0.5 MG: 1 INJECTION, SOLUTION INTRAMUSCULAR; INTRAVENOUS; SUBCUTANEOUS at 20:49

## 2021-04-18 RX ADMIN — LAMOTRIGINE 150 MG: 100 TABLET ORAL at 05:27

## 2021-04-18 RX ADMIN — HALOPERIDOL LACTATE 1 MG: 5 INJECTION, SOLUTION INTRAMUSCULAR at 18:25

## 2021-04-18 RX ADMIN — OXYCODONE HYDROCHLORIDE 10 MG: 5 SOLUTION ORAL at 15:36

## 2021-04-18 RX ADMIN — ACETAMINOPHEN 1000 MG: 500 TABLET ORAL at 12:26

## 2021-04-18 RX ADMIN — FAMOTIDINE 20 MG: 10 INJECTION INTRAVENOUS at 05:27

## 2021-04-18 RX ADMIN — ONDANSETRON 4 MG: 2 INJECTION INTRAMUSCULAR; INTRAVENOUS at 15:32

## 2021-04-18 RX ADMIN — GABAPENTIN 300 MG: 300 CAPSULE ORAL at 17:25

## 2021-04-18 RX ADMIN — ACETAMINOPHEN 1000 MG: 500 TABLET ORAL at 22:35

## 2021-04-18 RX ADMIN — ACETAMINOPHEN 1000 MG: 500 TABLET ORAL at 05:28

## 2021-04-18 RX ADMIN — SENNOSIDES 8.6 MG: 8.6 TABLET, FILM COATED ORAL at 17:24

## 2021-04-18 RX ADMIN — OXYCODONE HYDROCHLORIDE 10 MG: 5 SOLUTION ORAL at 22:35

## 2021-04-18 RX ADMIN — ENOXAPARIN SODIUM 40 MG: 40 INJECTION SUBCUTANEOUS at 17:25

## 2021-04-18 RX ADMIN — POTASSIUM CHLORIDE: 149 INJECTION, SOLUTION, CONCENTRATE INTRAVENOUS at 02:48

## 2021-04-18 RX ADMIN — GABAPENTIN 300 MG: 300 CAPSULE ORAL at 12:26

## 2021-04-18 RX ADMIN — ONDANSETRON 4 MG: 2 INJECTION INTRAMUSCULAR; INTRAVENOUS at 23:51

## 2021-04-18 RX ADMIN — OXYCODONE HYDROCHLORIDE 10 MG: 5 SOLUTION ORAL at 12:27

## 2021-04-18 RX ADMIN — GABAPENTIN 300 MG: 300 CAPSULE ORAL at 05:28

## 2021-04-18 RX ADMIN — FAMOTIDINE 20 MG: 10 INJECTION INTRAVENOUS at 17:24

## 2021-04-18 RX ADMIN — ALBUTEROL SULFATE 2.5 MG: 2.5 SOLUTION RESPIRATORY (INHALATION) at 22:09

## 2021-04-18 RX ADMIN — OXYCODONE HYDROCHLORIDE 5 MG: 5 SOLUTION ORAL at 00:04

## 2021-04-18 RX ADMIN — ONDANSETRON 4 MG: 2 INJECTION INTRAMUSCULAR; INTRAVENOUS at 09:27

## 2021-04-18 RX ADMIN — POLYETHYLENE GLYCOL 3350 1 PACKET: 17 POWDER, FOR SOLUTION ORAL at 10:31

## 2021-04-18 RX ADMIN — OXYCODONE HYDROCHLORIDE 10 MG: 5 SOLUTION ORAL at 18:32

## 2021-04-18 RX ADMIN — ONDANSETRON 4 MG: 2 INJECTION INTRAMUSCULAR; INTRAVENOUS at 00:04

## 2021-04-18 ASSESSMENT — PAIN DESCRIPTION - PAIN TYPE
TYPE: SURGICAL PAIN
TYPE: SURGICAL PAIN
TYPE: ACUTE PAIN;SURGICAL PAIN
TYPE: SURGICAL PAIN
TYPE: ACUTE PAIN;SURGICAL PAIN
TYPE: SURGICAL PAIN
TYPE: SURGICAL PAIN

## 2021-04-18 ASSESSMENT — ENCOUNTER SYMPTOMS
ABDOMINAL PAIN: 1
NAUSEA: 1
COUGH: 0
CHILLS: 0
VOMITING: 0
DIZZINESS: 0
FEVER: 0

## 2021-04-18 NOTE — PROGRESS NOTES
Pt reported to this RN about blood tinged mucus from mouth and nose in small amounts during morning medication pass. This RN observed mucus. Held morning Lovenox out of precaution until speaking with MD. Attempted to page Dr. Pickett's office at 0712 and 0720 with no answer. Passed onto day RN to update MD.

## 2021-04-18 NOTE — PROGRESS NOTES
Surgical Progress Note    Author: Raphael Thompson P.A.-C. Date & Time created: 2021   9:01 AM     Interval Events:  50 year old female POD #2 Laparoscopic Sleeve Gastrectomy and Left Lobe Liver Biopsy progressing well. VSS, labs stable. Tolerating clears without nausea/vomiting. Admits to typical incisional abdominal pain, controlled with medication.  Feelings worse this AM. Seems she experienced some blood tinged sputum recently. Okay to continue inpatient lovenox shots. O2 requirements increasing now on 3L O2 with cough - will get chest x-ray.  Pt is ambulating and voiding. Patient has some concerns about discharge home.     Review of Systems   Constitutional: Negative for chills and fever.   Respiratory: Negative for cough.    Cardiovascular: Negative for chest pain.   Gastrointestinal: Positive for abdominal pain and nausea. Negative for vomiting.   Skin: Negative for rash.   Neurological: Negative for dizziness.     Hemodynamics:  Temp (24hrs), Av.9 °C (98.4 °F), Min:36.1 °C (97 °F), Max:37.4 °C (99.4 °F)  Temperature: 36.1 °C (97 °F)  Pulse  Av.6  Min: 70  Max: 115   Blood Pressure: 113/76     Respiratory:    Respiration: 16, Pulse Oximetry: 92 %     Given By:: Mouthpiece  RUL Breath Sounds: Diminished, RML Breath Sounds: Diminished, RLL Breath Sounds: Diminished, ABNER Breath Sounds: Diminished, LLL Breath Sounds: Diminished  Neuro:  GCS       Fluids:    Intake/Output Summary (Last 24 hours) at 2021 0901  Last data filed at 2021 0540  Gross per 24 hour   Intake 630 ml   Output --   Net 630 ml        Current Diet Order   Procedures   • Diet Order Diet: Clear Liquid (Sips with meds and ice chips okay on the day of surgery. ); Miscellaneous modifications: (optional): Bariatric     Physical Exam  Constitutional:       Appearance: Normal appearance. She is obese.   HENT:      Head: Normocephalic and atraumatic.      Mouth/Throat:      Mouth: Mucous membranes are moist.   Eyes:       Extraocular Movements: Extraocular movements intact.   Cardiovascular:      Rate and Rhythm: Normal rate and regular rhythm.   Pulmonary:      Effort: Pulmonary effort is normal.   Abdominal:      Palpations: Abdomen is soft. There is no mass.      Tenderness: There is abdominal tenderness.      Hernia: No hernia is present.   Musculoskeletal:      Cervical back: Normal range of motion.   Skin:     General: Skin is warm and dry.   Neurological:      General: No focal deficit present.      Mental Status: She is alert.       Labs:  Recent Results (from the past 24 hour(s))   CBC without Differential (blood)    Collection Time: 04/17/21 10:49 AM   Result Value Ref Range    WBC 18.2 (H) 4.8 - 10.8 K/uL    RBC 4.33 4.20 - 5.40 M/uL    Hemoglobin 14.2 12.0 - 16.0 g/dL    Hematocrit 41.8 37.0 - 47.0 %    MCV 96.5 81.4 - 97.8 fL    MCH 32.8 27.0 - 33.0 pg    MCHC 34.0 33.6 - 35.0 g/dL    RDW 44.2 35.9 - 50.0 fL    Platelet Count 297 164 - 446 K/uL    MPV 10.1 9.0 - 12.9 fL   Comp Metabolic Panel (CMP)    Collection Time: 04/17/21 10:49 AM   Result Value Ref Range    Sodium 134 (L) 135 - 145 mmol/L    Potassium 3.8 3.6 - 5.5 mmol/L    Chloride 98 96 - 112 mmol/L    Co2 31 20 - 33 mmol/L    Anion Gap 5.0 (L) 7.0 - 16.0    Glucose 100 (H) 65 - 99 mg/dL    Bun 11 8 - 22 mg/dL    Creatinine 0.82 0.50 - 1.40 mg/dL    Calcium 8.8 8.5 - 10.5 mg/dL    AST(SGOT) 40 12 - 45 U/L    ALT(SGPT) 36 2 - 50 U/L    Alkaline Phosphatase 43 30 - 99 U/L    Total Bilirubin 0.5 0.1 - 1.5 mg/dL    Albumin 4.0 3.2 - 4.9 g/dL    Total Protein 6.6 6.0 - 8.2 g/dL    Globulin 2.6 1.9 - 3.5 g/dL    A-G Ratio 1.5 g/dL   ESTIMATED GFR    Collection Time: 04/17/21 10:49 AM   Result Value Ref Range    GFR If African American >60 >60 mL/min/1.73 m 2    GFR If Non African American >60 >60 mL/min/1.73 m 2   POCT glucose device results    Collection Time: 04/17/21  1:08 PM   Result Value Ref Range    Glucose - Accu-Ck 90 65 - 99 mg/dL   POCT glucose device  results    Collection Time: 04/17/21  6:36 PM   Result Value Ref Range    Glucose - Accu-Ck 92 65 - 99 mg/dL   POCT glucose device results    Collection Time: 04/17/21 11:27 PM   Result Value Ref Range    Glucose - Accu-Ck 86 65 - 99 mg/dL   POCT glucose device results    Collection Time: 04/18/21  5:38 AM   Result Value Ref Range    Glucose - Accu-Ck 72 65 - 99 mg/dL   CBC without Differential (blood)    Collection Time: 04/18/21  6:03 AM   Result Value Ref Range    WBC 10.8 4.8 - 10.8 K/uL    RBC 4.07 (L) 4.20 - 5.40 M/uL    Hemoglobin 13.2 12.0 - 16.0 g/dL    Hematocrit 40.5 37.0 - 47.0 %    MCV 99.5 (H) 81.4 - 97.8 fL    MCH 32.4 27.0 - 33.0 pg    MCHC 32.6 (L) 33.6 - 35.0 g/dL    RDW 46.5 35.9 - 50.0 fL    Platelet Count 260 164 - 446 K/uL    MPV 10.1 9.0 - 12.9 fL   Comp Metabolic Panel (CMP)    Collection Time: 04/18/21  6:03 AM   Result Value Ref Range    Sodium 134 (L) 135 - 145 mmol/L    Potassium 3.6 3.6 - 5.5 mmol/L    Chloride 98 96 - 112 mmol/L    Co2 30 20 - 33 mmol/L    Anion Gap 6.0 (L) 7.0 - 16.0    Glucose 103 (H) 65 - 99 mg/dL    Bun 9 8 - 22 mg/dL    Creatinine 0.73 0.50 - 1.40 mg/dL    Calcium 8.4 (L) 8.5 - 10.5 mg/dL    AST(SGOT) 28 12 - 45 U/L    ALT(SGPT) 30 2 - 50 U/L    Alkaline Phosphatase 40 30 - 99 U/L    Total Bilirubin 0.5 0.1 - 1.5 mg/dL    Albumin 3.6 3.2 - 4.9 g/dL    Total Protein 6.2 6.0 - 8.2 g/dL    Globulin 2.6 1.9 - 3.5 g/dL    A-G Ratio 1.4 g/dL   ESTIMATED GFR    Collection Time: 04/18/21  6:03 AM   Result Value Ref Range    GFR If African American >60 >60 mL/min/1.73 m 2    GFR If Non African American >60 >60 mL/min/1.73 m 2   POCT glucose device results    Collection Time: 04/18/21  7:19 AM   Result Value Ref Range    Glucose - Accu-Ck 94 65 - 99 mg/dL     Medical Decision Making, by Problem:  There are no active hospital problems to display for this patient.    Plan:    Pt is alert and oriented, NAD. Breathing unlabored now requiring 3L O2 with new cough. Tolerating PO.   Incisions ok. VS stable. Labs stable.  Encouraged ambulation and incentive spirometry. Will get chest x-ray to evaluate higher O2 requirements and new cough. Discharge on hold for now. Okay to continue inpatient lovenox for DVT prophyaxis. Pt also seen and examined by Dr. Pickett.    Quality Measures:  Quality-Core Measures    Discussed patient condition with patient and Dr. Pickett

## 2021-04-18 NOTE — CARE PLAN
Problem: Safety  Goal: Will remain free from injury  Outcome: PROGRESSING AS EXPECTED  Bed in lowest and locked position. Call light and belongings in reach. Pt educated to call for assistance.      Problem: Venous Thromboembolism (VTW)/Deep Vein Thrombosis (DVT) Prevention:  Goal: Patient will participate in Venous Thrombosis (VTE)/Deep Vein Thrombosis (DVT)Prevention Measures  Outcome: PROGRESSING AS EXPECTED  Pt ambulates frequently.      Problem: Pain Management  Goal: Pain level will decrease to patient's comfort goal  Outcome: PROGRESSING AS EXPECTED  Pain assessed using verbal and nonverbal scales. PRN pain medication given as needed and as ordered. Pt educated about pain management  options such as ice packs.      Problem: Mobility  Goal: Risk for activity intolerance will decrease  Outcome: PROGRESSING AS EXPECTED  Mobility Progress Note    Surgery patient: Yes  Date of surgery: 4/16/2021  Ambulated 50 ft on day of surgery? (N/A if patient did not undergo surgery today): N/A  Number of times ambulated 50 feet or greater today: Greater than Six  Patient has been up to chair, edge of bed or HOB 90 degrees for all meals?: Yes  Goal met? (goal is ambulating at least 50 feet 2 times on day shift, one time on night shift): Yes  If patient did not meet mobility goal, why?: Goal was met

## 2021-04-18 NOTE — CARE PLAN
Problem: Communication  Goal: The ability to communicate needs accurately and effectively will improve  Outcome: PROGRESSING AS EXPECTED  Intervention: Educate patient and significant other/support system about the plan of care, procedures, treatments, medications and allow for questions  Note: Plan of care discussed with patient. All questions answered. Plan for today attempt to wean O2, monitor O2 sats, advance bowel protocol, chest xray, repeat covid test, monitor nausea. DC likely to home tomorrow.        Problem: Bowel/Gastric:  Goal: Normal bowel function is maintained or improved  Outcome: PROGRESSING SLOWER THAN EXPECTED  Intervention: Educate patient and significant other/support system about signs and symptoms of constipation and interventions to implement  Note: Miralax given. PRN stool softeners ordered. Pt on clears. + nausea but no emesis. Zofran and phenergan for nausea. Pt states good relief. Hypoactive bowel sounds. Pt abd soft, tender. Pt denies flatus. Meds crushed in clears. BM on 4/15.

## 2021-04-19 ENCOUNTER — APPOINTMENT (OUTPATIENT)
Dept: RADIOLOGY | Facility: MEDICAL CENTER | Age: 51
DRG: 619 | End: 2021-04-19
Attending: PHYSICIAN ASSISTANT
Payer: COMMERCIAL

## 2021-04-19 PROBLEM — R50.9 FEVER: Status: ACTIVE | Noted: 2021-04-19

## 2021-04-19 PROBLEM — E87.1 HYPONATREMIA: Status: ACTIVE | Noted: 2021-04-19

## 2021-04-19 PROBLEM — J96.01 ACUTE RESPIRATORY FAILURE WITH HYPOXIA (HCC): Status: ACTIVE | Noted: 2021-04-19

## 2021-04-19 PROBLEM — E66.9 OBESITY (BMI 35.0-39.9 WITHOUT COMORBIDITY): Status: ACTIVE | Noted: 2021-04-19

## 2021-04-19 LAB
ALBUMIN SERPL BCP-MCNC: 3.6 G/DL (ref 3.2–4.9)
ALBUMIN/GLOB SERPL: 1.3 G/DL
ALP SERPL-CCNC: 43 U/L (ref 30–99)
ALT SERPL-CCNC: 27 U/L (ref 2–50)
ANION GAP SERPL CALC-SCNC: 8 MMOL/L (ref 7–16)
APPEARANCE UR: CLEAR
AST SERPL-CCNC: 29 U/L (ref 12–45)
BILIRUB SERPL-MCNC: 0.6 MG/DL (ref 0.1–1.5)
BILIRUB UR QL STRIP.AUTO: NEGATIVE
BUN SERPL-MCNC: 9 MG/DL (ref 8–22)
CALCIUM SERPL-MCNC: 8.4 MG/DL (ref 8.5–10.5)
CHLORIDE SERPL-SCNC: 96 MMOL/L (ref 96–112)
CO2 SERPL-SCNC: 29 MMOL/L (ref 20–33)
COLOR UR: YELLOW
CREAT SERPL-MCNC: 0.79 MG/DL (ref 0.5–1.4)
ERYTHROCYTE [DISTWIDTH] IN BLOOD BY AUTOMATED COUNT: 47.2 FL (ref 35.9–50)
GLOBULIN SER CALC-MCNC: 2.8 G/DL (ref 1.9–3.5)
GLUCOSE BLD-MCNC: 100 MG/DL (ref 65–99)
GLUCOSE BLD-MCNC: 103 MG/DL (ref 65–99)
GLUCOSE BLD-MCNC: 108 MG/DL (ref 65–99)
GLUCOSE BLD-MCNC: 115 MG/DL (ref 65–99)
GLUCOSE SERPL-MCNC: 120 MG/DL (ref 65–99)
GLUCOSE UR STRIP.AUTO-MCNC: NEGATIVE MG/DL
HCT VFR BLD AUTO: 40.2 % (ref 37–47)
HGB BLD-MCNC: 13 G/DL (ref 12–16)
KETONES UR STRIP.AUTO-MCNC: NEGATIVE MG/DL
LACTATE BLD-SCNC: 1.1 MMOL/L (ref 0.5–2)
LEUKOCYTE ESTERASE UR QL STRIP.AUTO: NEGATIVE
MCH RBC QN AUTO: 32.9 PG (ref 27–33)
MCHC RBC AUTO-ENTMCNC: 32.3 G/DL (ref 33.6–35)
MCV RBC AUTO: 101.8 FL (ref 81.4–97.8)
MICRO URNS: NORMAL
NITRITE UR QL STRIP.AUTO: NEGATIVE
NT-PROBNP SERPL IA-MCNC: 363 PG/ML (ref 0–125)
PH UR STRIP.AUTO: 7.5 [PH] (ref 5–8)
PLATELET # BLD AUTO: 242 K/UL (ref 164–446)
PMV BLD AUTO: 9.8 FL (ref 9–12.9)
POTASSIUM SERPL-SCNC: 3.8 MMOL/L (ref 3.6–5.5)
PROCALCITONIN SERPL-MCNC: <0.05 NG/ML
PROT SERPL-MCNC: 6.4 G/DL (ref 6–8.2)
PROT UR QL STRIP: NEGATIVE MG/DL
RBC # BLD AUTO: 3.95 M/UL (ref 4.2–5.4)
RBC UR QL AUTO: NEGATIVE
SODIUM SERPL-SCNC: 133 MMOL/L (ref 135–145)
SP GR UR STRIP.AUTO: 1.01
UROBILINOGEN UR STRIP.AUTO-MCNC: 0.2 MG/DL
WBC # BLD AUTO: 10.9 K/UL (ref 4.8–10.8)

## 2021-04-19 PROCEDURE — 71275 CT ANGIOGRAPHY CHEST: CPT

## 2021-04-19 PROCEDURE — 770001 HCHG ROOM/CARE - MED/SURG/GYN PRIV*

## 2021-04-19 PROCEDURE — 700102 HCHG RX REV CODE 250 W/ 637 OVERRIDE(OP): Performed by: COLON & RECTAL SURGERY

## 2021-04-19 PROCEDURE — 700102 HCHG RX REV CODE 250 W/ 637 OVERRIDE(OP): Performed by: SURGERY

## 2021-04-19 PROCEDURE — 36415 COLL VENOUS BLD VENIPUNCTURE: CPT

## 2021-04-19 PROCEDURE — 83880 ASSAY OF NATRIURETIC PEPTIDE: CPT

## 2021-04-19 PROCEDURE — 83605 ASSAY OF LACTIC ACID: CPT

## 2021-04-19 PROCEDURE — 99232 SBSQ HOSP IP/OBS MODERATE 35: CPT | Performed by: STUDENT IN AN ORGANIZED HEALTH CARE EDUCATION/TRAINING PROGRAM

## 2021-04-19 PROCEDURE — 700117 HCHG RX CONTRAST REV CODE 255: Performed by: PHYSICIAN ASSISTANT

## 2021-04-19 PROCEDURE — A9270 NON-COVERED ITEM OR SERVICE: HCPCS | Performed by: PHYSICIAN ASSISTANT

## 2021-04-19 PROCEDURE — A9270 NON-COVERED ITEM OR SERVICE: HCPCS | Performed by: SURGERY

## 2021-04-19 PROCEDURE — 700111 HCHG RX REV CODE 636 W/ 250 OVERRIDE (IP): Performed by: STUDENT IN AN ORGANIZED HEALTH CARE EDUCATION/TRAINING PROGRAM

## 2021-04-19 PROCEDURE — 84145 PROCALCITONIN (PCT): CPT

## 2021-04-19 PROCEDURE — 700111 HCHG RX REV CODE 636 W/ 250 OVERRIDE (IP): Performed by: PHYSICIAN ASSISTANT

## 2021-04-19 PROCEDURE — 700105 HCHG RX REV CODE 258: Performed by: STUDENT IN AN ORGANIZED HEALTH CARE EDUCATION/TRAINING PROGRAM

## 2021-04-19 PROCEDURE — 700102 HCHG RX REV CODE 250 W/ 637 OVERRIDE(OP): Performed by: PHYSICIAN ASSISTANT

## 2021-04-19 PROCEDURE — A9270 NON-COVERED ITEM OR SERVICE: HCPCS | Performed by: COLON & RECTAL SURGERY

## 2021-04-19 PROCEDURE — 82962 GLUCOSE BLOOD TEST: CPT | Mod: 91

## 2021-04-19 PROCEDURE — 85027 COMPLETE CBC AUTOMATED: CPT

## 2021-04-19 PROCEDURE — 87040 BLOOD CULTURE FOR BACTERIA: CPT

## 2021-04-19 PROCEDURE — 81003 URINALYSIS AUTO W/O SCOPE: CPT

## 2021-04-19 PROCEDURE — 80053 COMPREHEN METABOLIC PANEL: CPT

## 2021-04-19 PROCEDURE — 700101 HCHG RX REV CODE 250: Performed by: STUDENT IN AN ORGANIZED HEALTH CARE EDUCATION/TRAINING PROGRAM

## 2021-04-19 RX ORDER — DOXYCYCLINE 100 MG/1
100 TABLET ORAL EVERY 12 HOURS
Status: DISCONTINUED | OUTPATIENT
Start: 2021-04-19 | End: 2021-04-19

## 2021-04-19 RX ORDER — IPRATROPIUM BROMIDE AND ALBUTEROL SULFATE 2.5; .5 MG/3ML; MG/3ML
3 SOLUTION RESPIRATORY (INHALATION)
Status: DISCONTINUED | OUTPATIENT
Start: 2021-04-19 | End: 2021-04-20 | Stop reason: ALTCHOICE

## 2021-04-19 RX ORDER — FUROSEMIDE 10 MG/ML
20 INJECTION INTRAMUSCULAR; INTRAVENOUS EVERY 6 HOURS
Status: COMPLETED | OUTPATIENT
Start: 2021-04-19 | End: 2021-04-19

## 2021-04-19 RX ORDER — ALBUTEROL SULFATE 90 UG/1
2 AEROSOL, METERED RESPIRATORY (INHALATION) EVERY 4 HOURS PRN
Status: DISCONTINUED | OUTPATIENT
Start: 2021-04-19 | End: 2021-04-21 | Stop reason: HOSPADM

## 2021-04-19 RX ORDER — FUROSEMIDE 10 MG/ML
20 INJECTION INTRAMUSCULAR; INTRAVENOUS
Status: DISCONTINUED | OUTPATIENT
Start: 2021-04-20 | End: 2021-04-21

## 2021-04-19 RX ADMIN — CHLORTHALIDONE 25 MG: 25 TABLET ORAL at 04:16

## 2021-04-19 RX ADMIN — OXYCODONE HYDROCHLORIDE 10 MG: 5 SOLUTION ORAL at 18:51

## 2021-04-19 RX ADMIN — FAMOTIDINE 20 MG: 10 INJECTION INTRAVENOUS at 18:33

## 2021-04-19 RX ADMIN — OXYCODONE HYDROCHLORIDE 10 MG: 5 SOLUTION ORAL at 14:47

## 2021-04-19 RX ADMIN — HYDROMORPHONE HYDROCHLORIDE 0.5 MG: 1 INJECTION, SOLUTION INTRAMUSCULAR; INTRAVENOUS; SUBCUTANEOUS at 09:50

## 2021-04-19 RX ADMIN — IOHEXOL 60 ML: 350 INJECTION, SOLUTION INTRAVENOUS at 10:43

## 2021-04-19 RX ADMIN — SODIUM CHLORIDE 3 G: 900 INJECTION INTRAVENOUS at 18:39

## 2021-04-19 RX ADMIN — ACETAMINOPHEN 1000 MG: 500 TABLET ORAL at 03:54

## 2021-04-19 RX ADMIN — GABAPENTIN 300 MG: 300 CAPSULE ORAL at 18:43

## 2021-04-19 RX ADMIN — ONDANSETRON 4 MG: 2 INJECTION INTRAMUSCULAR; INTRAVENOUS at 18:50

## 2021-04-19 RX ADMIN — FUROSEMIDE 20 MG: 10 INJECTION, SOLUTION INTRAMUSCULAR; INTRAVENOUS at 21:50

## 2021-04-19 RX ADMIN — ACETAMINOPHEN 1000 MG: 500 TABLET ORAL at 21:50

## 2021-04-19 RX ADMIN — GABAPENTIN 300 MG: 300 CAPSULE ORAL at 12:08

## 2021-04-19 RX ADMIN — ENOXAPARIN SODIUM 40 MG: 40 INJECTION SUBCUTANEOUS at 08:29

## 2021-04-19 RX ADMIN — FUROSEMIDE 20 MG: 10 INJECTION, SOLUTION INTRAMUSCULAR; INTRAVENOUS at 14:44

## 2021-04-19 RX ADMIN — ONDANSETRON 4 MG: 2 INJECTION INTRAMUSCULAR; INTRAVENOUS at 08:39

## 2021-04-19 RX ADMIN — ONDANSETRON 4 MG: 2 INJECTION INTRAMUSCULAR; INTRAVENOUS at 14:44

## 2021-04-19 RX ADMIN — ALBUTEROL SULFATE 2 PUFF: 90 AEROSOL, METERED RESPIRATORY (INHALATION) at 21:39

## 2021-04-19 RX ADMIN — FAMOTIDINE 20 MG: 10 INJECTION INTRAVENOUS at 04:16

## 2021-04-19 RX ADMIN — POLYETHYLENE GLYCOL 3350 1 PACKET: 17 POWDER, FOR SOLUTION ORAL at 04:08

## 2021-04-19 RX ADMIN — OXYCODONE HYDROCHLORIDE 10 MG: 5 SOLUTION ORAL at 03:51

## 2021-04-19 RX ADMIN — ENOXAPARIN SODIUM 40 MG: 40 INJECTION SUBCUTANEOUS at 18:44

## 2021-04-19 RX ADMIN — LAMOTRIGINE 150 MG: 100 TABLET ORAL at 04:16

## 2021-04-19 RX ADMIN — ONDANSETRON 4 MG: 2 INJECTION INTRAMUSCULAR; INTRAVENOUS at 03:53

## 2021-04-19 RX ADMIN — SENNOSIDES 8.6 MG: 8.6 TABLET, FILM COATED ORAL at 04:16

## 2021-04-19 RX ADMIN — IPRATROPIUM BROMIDE AND ALBUTEROL SULFATE 3 ML: .5; 3 SOLUTION RESPIRATORY (INHALATION) at 22:00

## 2021-04-19 RX ADMIN — ACETAMINOPHEN 1000 MG: 500 TABLET ORAL at 11:30

## 2021-04-19 RX ADMIN — GABAPENTIN 300 MG: 300 CAPSULE ORAL at 04:16

## 2021-04-19 RX ADMIN — ALBUTEROL SULFATE 2 PUFF: 90 AEROSOL, METERED RESPIRATORY (INHALATION) at 10:28

## 2021-04-19 RX ADMIN — ACETAMINOPHEN 1000 MG: 500 TABLET ORAL at 18:55

## 2021-04-19 ASSESSMENT — PAIN DESCRIPTION - PAIN TYPE
TYPE: SURGICAL PAIN
TYPE: ACUTE PAIN;SURGICAL PAIN
TYPE: ACUTE PAIN;SURGICAL PAIN
TYPE: SURGICAL PAIN
TYPE: SURGICAL PAIN

## 2021-04-19 ASSESSMENT — COGNITIVE AND FUNCTIONAL STATUS - GENERAL
HELP NEEDED FOR BATHING: A LITTLE
MOBILITY SCORE: 23
SUGGESTED CMS G CODE MODIFIER DAILY ACTIVITY: CJ
SUGGESTED CMS G CODE MODIFIER MOBILITY: CI
DAILY ACTIVITIY SCORE: 22
DRESSING REGULAR LOWER BODY CLOTHING: A LITTLE
CLIMB 3 TO 5 STEPS WITH RAILING: A LITTLE

## 2021-04-19 ASSESSMENT — ENCOUNTER SYMPTOMS
DIZZINESS: 0
CHILLS: 0
SHORTNESS OF BREATH: 1
NAUSEA: 0
VOMITING: 0
NAUSEA: 1
COUGH: 1
FEVER: 0
ABDOMINAL PAIN: 1
DIARRHEA: 0

## 2021-04-19 NOTE — CARE PLAN
Problem: Infection  Goal: Will remain free from infection  Outcome: PROGRESSING SLOWER THAN EXPECTED  The pt has been having fevers during the last shift and this shift. The MD was paged and updated.   Problem: Knowledge Deficit  Goal: Knowledge of disease process/condition, treatment plan, diagnostic tests, and medications will improve  Outcome: PROGRESSING AS EXPECTED   The pt is educated on her plan of care, treatments, and also her medications. Questions and concerns are answered.   Problem: Respiratory:  Goal: Respiratory status will improve  Outcome: PROGRESSING SLOWER THAN EXPECTED   The pt is now having decreased breath sounds in both lower lobes. MD updated.

## 2021-04-19 NOTE — PROGRESS NOTES
9089 Rounding completed with Raphael BANSAL. Pt still requiring O2. Pt was febrile this a.m. Covid negative yesterday. Awaiting procalcitonin result. S/w respiratory for breathing treatment.

## 2021-04-19 NOTE — PROGRESS NOTES
The pt had a fever of 102.4F at midnight vitals at 2300. The pt was given her nightly tylenol to which her temperature dropped to 99.7F.  This RN paged the on-call Dr. Hamm to inform him of the the pt's current temperature, and he gave the okay to order a CBC for the AM but no other orders were given at this time.

## 2021-04-19 NOTE — CONSULTS
Hospital Medicine Consultation    Date of Service  4/19/2021    Referring Physician  Houston Pickett M.D.    Consulting Physician  Vandana Hernandez M.D.    Reason for Consultation  Fever and hypoxemia    History of Presenting Illness  50 y.o. female with past medical history of asthma, obesity and bipolar, who presented 4/16/2021 with elective lap gastric sleeve surgery. Patient underwent Lap sleeve gastrectomy and left lobe liver biopsy on 4/16. Patient developed spike fever with Temp 102.4 last night and sob requiring 4L NC. She reports having been on IVF since admission. Reports pain in the surgery incision site, no nausea, vomiting, diarrhea. No dysuria. CTA chest showed no evidence of PE. Minimal bilateral pleural effusions and minimal bibasilar atelectasis or pneumonia. Internal medicine is consulted to assist medical managements.     Review of Systems  Review of Systems   Respiratory: Positive for shortness of breath.    Gastrointestinal: Negative for diarrhea, nausea and vomiting.   Genitourinary: Negative for dysuria.   All other systems reviewed and are negative.      Past Medical History   has a past medical history of Asthma, Bipolar 1 disorder, depressed (HCC), Heart burn, High cholesterol (04/08/2021), Hypertension (04/08/2021), and Hypothyroidism.    Surgical History   has a past surgical history that includes hysterectomy, total abdominal; pr lap, pedro restrict proc, longitudinal gas* (4/16/2021); and pr unlisted laparoscopic proc, liver (4/16/2021).    Family History  Reviewed, not pertinent    Social History   reports that she has quit smoking. She quit after 25.00 years of use. She has never used smokeless tobacco. She reports that she does not drink alcohol and does not use drugs.    Medications  Prior to Admission Medications   Prescriptions Last Dose Informant Patient Reported? Taking?   Respiratory Therapy Supplies (NEBULIZER COMPRESSOR) Kit   No No   Sig: Dispense nebulizer machine and all  associated supplies.   albuterol (PROVENTIL) 2.5mg/3ml Nebu Soln solution for nebulization   No No   Sig: 3 mL by Nebulization route every four hours as needed for Shortness of Breath.   albuterol 108 (90 Base) MCG/ACT Aero Soln inhalation aerosol Not Taking at Unknown time  No No   Sig: Inhale 2 Puffs by mouth every 6 hours as needed for Shortness of Breath.   Patient not taking: Reported on 2021   benzonatate (TESSALON) 200 MG capsule   No No   Si CAP BY MOUTH UP TO 3 TIMES A DAY ONLY IF NEEDED FOR COUGH.   Patient not taking: Reported on 2021   chlorthalidone (HYGROTON) 25 MG Tab 4/15/2021 at 1600  No No   Sig: Take 1 Tab by mouth every day.   fluticasone (FLONASE) 50 MCG/ACT nasal spray 4/15/2021 at 1600  No No   Sig: Spray 2 Sprays in nose every day.   lamotrigine (LAMICTAL) 150 MG tablet 4/15/2021 at 1600  Yes No   Sig: Take 150 mg by mouth every day.   metFORMIN (GLUCOPHAGE) 500 MG Tab 2021 at stopped  Yes No   Sig: Take 500 mg by mouth every day.   omeprazole (PRILOSEC) 40 MG delayed-release capsule 4/15/2021 at 1600  No No   Sig: Take 1 Cap by mouth every day.   scopolamine (TRANSDERM-SCOP) 1 mg/72hr PATCH 72 HR 4/15/2021 at 2200 on  Yes Yes   Sig: Place 1 Patch on the skin every 72 hours.   traMADol (ULTRAM) 50 MG Tab   Yes No   Sig: Take 50 mg by mouth every four hours as needed.      Facility-Administered Medications: None       Allergies  No Known Allergies    Physical Exam  Temp:  [36.6 °C (97.8 °F)-39.1 °C (102.4 °F)] 37.1 °C (98.8 °F)  Pulse:  [75-99] 81  Resp:  [17-20] 17  BP: (108-126)/(73-81) 115/79  SpO2:  [91 %-97 %] 97 %    Physical Exam  Vitals reviewed.   Constitutional:       General: She is not in acute distress.     Appearance: Normal appearance. She is obese. She is not toxic-appearing.   HENT:      Head: Normocephalic and atraumatic.      Nose: Nose normal.      Mouth/Throat:      Pharynx: Oropharynx is clear.   Eyes:      Extraocular Movements: Extraocular movements  intact.      Conjunctiva/sclera: Conjunctivae normal.      Pupils: Pupils are equal, round, and reactive to light.   Cardiovascular:      Rate and Rhythm: Normal rate and regular rhythm.      Pulses: Normal pulses.      Heart sounds: Normal heart sounds.   Pulmonary:      Effort: Pulmonary effort is normal.      Breath sounds: Wheezing present.      Comments: On oxygen supplements  Abdominal:      General: Abdomen is flat. Bowel sounds are normal.      Palpations: Abdomen is soft.   Musculoskeletal:         General: No swelling or tenderness. Normal range of motion.      Cervical back: Normal range of motion and neck supple.   Skin:     General: Skin is warm.   Neurological:      General: No focal deficit present.      Mental Status: She is alert and oriented to person, place, and time. Mental status is at baseline.   Psychiatric:         Mood and Affect: Mood normal.         Behavior: Behavior normal.         Fluids  Date 04/19/21 0700 - 04/20/21 0659   Shift 8482-7249 0288-6590 9808-7160 24 Hour Total   INTAKE   P.O. 120   120   Shift Total 120   120   OUTPUT   Shift Total       Weight (kg) 90.9 90.9 90.9 90.9       Laboratory  Recent Labs     04/17/21  1049 04/18/21  0603 04/19/21  0552   WBC 18.2* 10.8 10.9*   RBC 4.33 4.07* 3.95*   HEMOGLOBIN 14.2 13.2 13.0   HEMATOCRIT 41.8 40.5 40.2   MCV 96.5 99.5* 101.8*   MCH 32.8 32.4 32.9   MCHC 34.0 32.6* 32.3*   RDW 44.2 46.5 47.2   PLATELETCT 297 260 242   MPV 10.1 10.1 9.8     Recent Labs     04/17/21  1049 04/18/21  0603 04/19/21  0552   SODIUM 134* 134* 133*   POTASSIUM 3.8 3.6 3.8   CHLORIDE 98 98 96   CO2 31 30 29   GLUCOSE 100* 103* 120*   BUN 11 9 9   CREATININE 0.82 0.73 0.79   CALCIUM 8.8 8.4* 8.4*                     Imaging  CT-CTA CHEST PULMONARY ARTERY W/ RECONS   Final Result      1.  No evidence of pulmonary embolism.      2.  Minimal bilateral pleural effusions and minimal bibasilar atelectasis or pneumonia.      3.  Cholecystectomy.             DX-CHEST-2 VIEWS   Final Result      1.  Development of minimal perihilar and interstitial opacifications could indicate pneumonitis or bronchitis or edema.      2.  No consolidations identified.          Assessment/Plan  * Acute respiratory failure with hypoxia (HCC)  Assessment & Plan  Post op. Not on home oxygen  Chest CTA personally reviewed: no evidence of PE. Minimal bilateral pleural effusions and bibasilar atelectasis  Procal: normal  Ddx include pneumonitis, pneumonia, atelectasis, fluid overload giving she has been on IVF since admission and wheezing on examination or reactive airway disease. PE ruled out by CTA    Check proBNP  Encourage IS  Start lasix 20mg iv q6h for 2 doses and monitor any improvements  I will start unasyn empirically given she had spike fever and requires O2, though her procalcitonin is normal.   Duoneb  RT protocol  Wean O2 as tolerated    Fever  Assessment & Plan  Srikanth fever on 4/18 post op  procal normal  Check blood culture, UA, lactic acid  Will start unasyn empirically and adjust it according to clinical symptoms and final culture results    Obesity (BMI 35.0-39.9 without comorbidity)  Assessment & Plan  S/p Lap sleeve gastrectomy on 4/16  Life style modifications include plant based diet and regular exercise recommended    Gastroesophageal reflux disease- (present on admission)  Assessment & Plan  Cont pepcid    Essential hypertension- (present on admission)  Assessment & Plan  Cont chlorthalidone    Bipolar affective disorder, currently depressed, mild (HCC)- (present on admission)  Assessment & Plan  Cont Lamotrigine

## 2021-04-19 NOTE — ASSESSMENT & PLAN NOTE
S/p Lap sleeve gastrectomy on 4/16  Life style modifications include plant based diet and regular exercise recommended

## 2021-04-19 NOTE — ASSESSMENT & PLAN NOTE
Srikanth fever on 4/18 post op  procal normal  Check blood culture, UA NGT   Abdominal CT negative for infection   Continue to switch to augmentin on discharge. 7 days total treatment

## 2021-04-19 NOTE — ASSESSMENT & PLAN NOTE
Post op. Not on home oxygen  Chest CTA personally reviewed: no evidence of PE. Minimal bilateral pleural effusions and bibasilar atelectasis  Procal: normal  Ddx include pneumonitis, pneumonia, atelectasis, fluid overload giving she has been on IVF since admission and wheezing on examination or reactive airway disease. PE ruled out by CTA    Check proBNP  Encourage IS  continue lasix 20mg iv q6h for 2 doses and monitor any improvements  Continue unasyn (fever/hypoxia)   Duoneb  RT protocol  Wean O2 as tolerated

## 2021-04-19 NOTE — CARE PLAN
"  Problem: Infection  Goal: Will remain free from infection  Outcome: PROGRESSING SLOWER THAN EXPECTED  Intervention: Assess signs and symptoms of infection  Note: Pt states \"I feel like I have a fever.\" Pt has been afebrile this shift. IV abx ordered. Blood cultures ordered. UA sent.      Problem: Respiratory:  Goal: Respiratory status will improve  Outcome: PROGRESSING SLOWER THAN EXPECTED  Intervention: Educate and encourage incentive spirometry usage  Note: IS up to 1500. Strong effort. Pt still requiring O2. Negative for PE. SOB with exertion. Respiratory consult. Albuterol inhaler PRN.     "

## 2021-04-19 NOTE — PROGRESS NOTES
Surgical Progress Note    Author: Raphael Thompson P.A.-C. Date & Time created: 2021   1:15 PM     Interval Events:  50 year old female POD #3 Laparoscopic Sleeve Gastrectomy and Left Lobe Liver Biopsy progressing well. VSS requiring up to 4L NC and spiked a Fever of 102.4 last night, labs stable.    Tolerating clears without nausea/vomiting. Admits to typical incisional abdominal pain, controlled with medication.    Feelings worse this AM. X-ray negative, COVID negative. Will get CT to rule out PE. Procalcitonin negative, low suspicion for PNA but unable to rule out.         Will continue to monitor.  Will consult medicine service.   Review of Systems   Constitutional: Negative for chills and fever.   Respiratory: Positive for cough.    Cardiovascular: Negative for chest pain.   Gastrointestinal: Positive for abdominal pain and nausea. Negative for vomiting.   Skin: Negative for rash.   Neurological: Negative for dizziness.     Hemodynamics:  Temp (24hrs), Av.6 °C (99.7 °F), Min:36.6 °C (97.8 °F), Max:39.1 °C (102.4 °F)  Temperature: (Q8 per RN)  Pulse  Av.2  Min: 70  Max: 115   Blood Pressure: 115/79     Respiratory:    Respiration: 17, Pulse Oximetry: 97 %     Given By:: Mouthpiece, Work Of Breathing / Effort: Mild  RUL Breath Sounds: Clear, RML Breath Sounds: Clear, RLL Breath Sounds: Clear;Expiratory Wheezes, ABNER Breath Sounds: Clear, LLL Breath Sounds: Clear;Expiratory Wheezes  Neuro:  GCS       Fluids:    Intake/Output Summary (Last 24 hours) at 2021 1315  Last data filed at 2021 1000  Gross per 24 hour   Intake 120 ml   Output --   Net 120 ml        Current Diet Order   Procedures   • Diet Order Diet: Full Liquid (Bariatric fulls please.  )     Physical Exam  Constitutional:       Appearance: Normal appearance. She is obese.   HENT:      Head: Normocephalic and atraumatic.      Mouth/Throat:      Mouth: Mucous membranes are moist.   Eyes:      Extraocular Movements: Extraocular  movements intact.   Cardiovascular:      Rate and Rhythm: Normal rate and regular rhythm.   Pulmonary:      Effort: Pulmonary effort is normal.      Comments: 3L NC  Abdominal:      Palpations: Abdomen is soft. There is no mass.      Tenderness: There is abdominal tenderness.      Hernia: No hernia is present.   Musculoskeletal:      Cervical back: Normal range of motion.   Skin:     General: Skin is warm and dry.   Neurological:      General: No focal deficit present.      Mental Status: She is alert.       Labs:  Recent Results (from the past 24 hour(s))   SARS-CoV-2 PCR (24 hour In-House): Collect NP swab in [place]    Collection Time: 04/18/21  3:27 PM    Specimen: Nasopharyngeal; Respirate   Result Value Ref Range    SARS-CoV-2 Source NP Swab     SARS-CoV-2 by PCR NotDetected    POCT glucose device results    Collection Time: 04/18/21  5:33 PM   Result Value Ref Range    Glucose - Accu-Ck 82 65 - 99 mg/dL   POCT glucose device results    Collection Time: 04/19/21 12:32 AM   Result Value Ref Range    Glucose - Accu-Ck 115 (H) 65 - 99 mg/dL   CBC without Differential (blood)    Collection Time: 04/19/21  5:52 AM   Result Value Ref Range    WBC 10.9 (H) 4.8 - 10.8 K/uL    RBC 3.95 (L) 4.20 - 5.40 M/uL    Hemoglobin 13.0 12.0 - 16.0 g/dL    Hematocrit 40.2 37.0 - 47.0 %    .8 (H) 81.4 - 97.8 fL    MCH 32.9 27.0 - 33.0 pg    MCHC 32.3 (L) 33.6 - 35.0 g/dL    RDW 47.2 35.9 - 50.0 fL    Platelet Count 242 164 - 446 K/uL    MPV 9.8 9.0 - 12.9 fL   Comp Metabolic Panel (CMP)    Collection Time: 04/19/21  5:52 AM   Result Value Ref Range    Sodium 133 (L) 135 - 145 mmol/L    Potassium 3.8 3.6 - 5.5 mmol/L    Chloride 96 96 - 112 mmol/L    Co2 29 20 - 33 mmol/L    Anion Gap 8.0 7.0 - 16.0    Glucose 120 (H) 65 - 99 mg/dL    Bun 9 8 - 22 mg/dL    Creatinine 0.79 0.50 - 1.40 mg/dL    Calcium 8.4 (L) 8.5 - 10.5 mg/dL    AST(SGOT) 29 12 - 45 U/L    ALT(SGPT) 27 2 - 50 U/L    Alkaline Phosphatase 43 30 - 99 U/L    Total  Bilirubin 0.6 0.1 - 1.5 mg/dL    Albumin 3.6 3.2 - 4.9 g/dL    Total Protein 6.4 6.0 - 8.2 g/dL    Globulin 2.8 1.9 - 3.5 g/dL    A-G Ratio 1.3 g/dL   PROCALCITONIN    Collection Time: 04/19/21  5:52 AM   Result Value Ref Range    Procalcitonin <0.05 <0.25 ng/mL   ESTIMATED GFR    Collection Time: 04/19/21  5:52 AM   Result Value Ref Range    GFR If African American >60 >60 mL/min/1.73 m 2    GFR If Non African American >60 >60 mL/min/1.73 m 2   POCT glucose device results    Collection Time: 04/19/21  6:36 AM   Result Value Ref Range    Glucose - Accu-Ck 100 (H) 65 - 99 mg/dL   POCT glucose device results    Collection Time: 04/19/21 12:10 PM   Result Value Ref Range    Glucose - Accu-Ck 103 (H) 65 - 99 mg/dL     Medical Decision Making, by Problem:  There are no active hospital problems to display for this patient.    Plan:  Pt is alert and oriented, NAD. Breathing unlabored now requiring 3-4L O2 with new cough. Tolerating PO.  Incisions ok. VS stable. Labs stable.  Encouraged ambulation and incentive spirometry. Chest X-ray low yield, CT negative for PE, possibly PNA. Fever last night, and lateral WBC. Procalcitonin is negative.     Will plan to consult medicine service and we greatly appreciate their help. Plan of care discussed with Dr. Pickett.    Discharge planning on hold.     Quality Measures:  Quality-Core Measures    Discussed patient condition with Patient and Dr. Pickett

## 2021-04-20 ENCOUNTER — APPOINTMENT (OUTPATIENT)
Dept: RADIOLOGY | Facility: MEDICAL CENTER | Age: 51
DRG: 619 | End: 2021-04-20
Attending: INTERNAL MEDICINE
Payer: COMMERCIAL

## 2021-04-20 LAB
ALBUMIN SERPL BCP-MCNC: 4 G/DL (ref 3.2–4.9)
ALBUMIN/GLOB SERPL: 1.4 G/DL
ALP SERPL-CCNC: 53 U/L (ref 30–99)
ALT SERPL-CCNC: 29 U/L (ref 2–50)
ANION GAP SERPL CALC-SCNC: 8 MMOL/L (ref 7–16)
AST SERPL-CCNC: 22 U/L (ref 12–45)
BILIRUB SERPL-MCNC: 0.4 MG/DL (ref 0.1–1.5)
BUN SERPL-MCNC: 9 MG/DL (ref 8–22)
CALCIUM SERPL-MCNC: 9.2 MG/DL (ref 8.5–10.5)
CHLORIDE SERPL-SCNC: 90 MMOL/L (ref 96–112)
CO2 SERPL-SCNC: 35 MMOL/L (ref 20–33)
CREAT SERPL-MCNC: 0.86 MG/DL (ref 0.5–1.4)
ERYTHROCYTE [DISTWIDTH] IN BLOOD BY AUTOMATED COUNT: 44.9 FL (ref 35.9–50)
GLOBULIN SER CALC-MCNC: 2.9 G/DL (ref 1.9–3.5)
GLUCOSE BLD-MCNC: 113 MG/DL (ref 65–99)
GLUCOSE BLD-MCNC: 87 MG/DL (ref 65–99)
GLUCOSE BLD-MCNC: 97 MG/DL (ref 65–99)
GLUCOSE BLD-MCNC: 99 MG/DL (ref 65–99)
GLUCOSE SERPL-MCNC: 103 MG/DL (ref 65–99)
HCT VFR BLD AUTO: 42.1 % (ref 37–47)
HGB BLD-MCNC: 13.9 G/DL (ref 12–16)
MCH RBC QN AUTO: 32.6 PG (ref 27–33)
MCHC RBC AUTO-ENTMCNC: 33 G/DL (ref 33.6–35)
MCV RBC AUTO: 98.8 FL (ref 81.4–97.8)
PLATELET # BLD AUTO: 255 K/UL (ref 164–446)
PMV BLD AUTO: 10.2 FL (ref 9–12.9)
POTASSIUM SERPL-SCNC: 3.2 MMOL/L (ref 3.6–5.5)
PROCALCITONIN SERPL-MCNC: <0.05 NG/ML
PROT SERPL-MCNC: 6.9 G/DL (ref 6–8.2)
RBC # BLD AUTO: 4.26 M/UL (ref 4.2–5.4)
SODIUM SERPL-SCNC: 133 MMOL/L (ref 135–145)
WBC # BLD AUTO: 9 K/UL (ref 4.8–10.8)

## 2021-04-20 PROCEDURE — 74177 CT ABD & PELVIS W/CONTRAST: CPT

## 2021-04-20 PROCEDURE — 700111 HCHG RX REV CODE 636 W/ 250 OVERRIDE (IP): Performed by: STUDENT IN AN ORGANIZED HEALTH CARE EDUCATION/TRAINING PROGRAM

## 2021-04-20 PROCEDURE — 700102 HCHG RX REV CODE 250 W/ 637 OVERRIDE(OP): Performed by: PHYSICIAN ASSISTANT

## 2021-04-20 PROCEDURE — 80053 COMPREHEN METABOLIC PANEL: CPT

## 2021-04-20 PROCEDURE — 770001 HCHG ROOM/CARE - MED/SURG/GYN PRIV*

## 2021-04-20 PROCEDURE — 84145 PROCALCITONIN (PCT): CPT

## 2021-04-20 PROCEDURE — A9270 NON-COVERED ITEM OR SERVICE: HCPCS | Performed by: PHYSICIAN ASSISTANT

## 2021-04-20 PROCEDURE — 85027 COMPLETE CBC AUTOMATED: CPT

## 2021-04-20 PROCEDURE — 36415 COLL VENOUS BLD VENIPUNCTURE: CPT

## 2021-04-20 PROCEDURE — 99233 SBSQ HOSP IP/OBS HIGH 50: CPT | Performed by: INTERNAL MEDICINE

## 2021-04-20 PROCEDURE — 700117 HCHG RX CONTRAST REV CODE 255: Performed by: INTERNAL MEDICINE

## 2021-04-20 PROCEDURE — 700105 HCHG RX REV CODE 258: Performed by: STUDENT IN AN ORGANIZED HEALTH CARE EDUCATION/TRAINING PROGRAM

## 2021-04-20 PROCEDURE — 82962 GLUCOSE BLOOD TEST: CPT

## 2021-04-20 PROCEDURE — 700111 HCHG RX REV CODE 636 W/ 250 OVERRIDE (IP): Performed by: PHYSICIAN ASSISTANT

## 2021-04-20 RX ORDER — POTASSIUM CHLORIDE 20 MEQ/1
40 TABLET, EXTENDED RELEASE ORAL ONCE
Status: COMPLETED | OUTPATIENT
Start: 2021-04-20 | End: 2021-04-20

## 2021-04-20 RX ORDER — POTASSIUM CHLORIDE 20 MEQ/1
40 TABLET, EXTENDED RELEASE ORAL ONCE
Status: DISCONTINUED | OUTPATIENT
Start: 2021-04-20 | End: 2021-04-20

## 2021-04-20 RX ORDER — BISACODYL 10 MG
10 SUPPOSITORY, RECTAL RECTAL
Status: DISCONTINUED | OUTPATIENT
Start: 2021-04-20 | End: 2021-04-21 | Stop reason: HOSPADM

## 2021-04-20 RX ORDER — SENNOSIDES A AND B 8.6 MG/1
8.6 TABLET, FILM COATED ORAL 2 TIMES DAILY PRN
Status: DISCONTINUED | OUTPATIENT
Start: 2021-04-20 | End: 2021-04-21 | Stop reason: HOSPADM

## 2021-04-20 RX ADMIN — SIMETHICONE 80 MG: 80 TABLET, CHEWABLE ORAL at 10:39

## 2021-04-20 RX ADMIN — SODIUM CHLORIDE 3 G: 900 INJECTION INTRAVENOUS at 05:52

## 2021-04-20 RX ADMIN — SODIUM CHLORIDE 3 G: 900 INJECTION INTRAVENOUS at 12:57

## 2021-04-20 RX ADMIN — SODIUM CHLORIDE 3 G: 900 INJECTION INTRAVENOUS at 17:53

## 2021-04-20 RX ADMIN — OXYCODONE HYDROCHLORIDE 10 MG: 5 SOLUTION ORAL at 10:38

## 2021-04-20 RX ADMIN — SODIUM CHLORIDE 3 G: 900 INJECTION INTRAVENOUS at 23:44

## 2021-04-20 RX ADMIN — SODIUM CHLORIDE 3 G: 900 INJECTION INTRAVENOUS at 00:53

## 2021-04-20 RX ADMIN — FUROSEMIDE 20 MG: 10 INJECTION, SOLUTION INTRAMUSCULAR; INTRAVENOUS at 05:10

## 2021-04-20 RX ADMIN — OXYCODONE HYDROCHLORIDE 5 MG: 5 SOLUTION ORAL at 18:43

## 2021-04-20 RX ADMIN — FAMOTIDINE 20 MG: 10 INJECTION INTRAVENOUS at 05:10

## 2021-04-20 RX ADMIN — CHLORTHALIDONE 25 MG: 25 TABLET ORAL at 05:11

## 2021-04-20 RX ADMIN — ENOXAPARIN SODIUM 40 MG: 40 INJECTION SUBCUTANEOUS at 05:58

## 2021-04-20 RX ADMIN — GABAPENTIN 300 MG: 300 CAPSULE ORAL at 05:10

## 2021-04-20 RX ADMIN — ALBUTEROL SULFATE 2 PUFF: 90 AEROSOL, METERED RESPIRATORY (INHALATION) at 10:38

## 2021-04-20 RX ADMIN — BENZOCAINE AND MENTHOL 1 LOZENGE: 15; 3.6 LOZENGE ORAL at 10:41

## 2021-04-20 RX ADMIN — ENOXAPARIN SODIUM 40 MG: 40 INJECTION SUBCUTANEOUS at 17:54

## 2021-04-20 RX ADMIN — ACETAMINOPHEN 1000 MG: 500 TABLET ORAL at 23:51

## 2021-04-20 RX ADMIN — ONDANSETRON 4 MG: 2 INJECTION INTRAMUSCULAR; INTRAVENOUS at 10:39

## 2021-04-20 RX ADMIN — OXYCODONE HYDROCHLORIDE 10 MG: 5 SOLUTION ORAL at 05:41

## 2021-04-20 RX ADMIN — POTASSIUM CHLORIDE 40 MEQ: 1500 TABLET, EXTENDED RELEASE ORAL at 07:46

## 2021-04-20 RX ADMIN — IOHEXOL 100 ML: 350 INJECTION, SOLUTION INTRAVENOUS at 12:57

## 2021-04-20 RX ADMIN — GABAPENTIN 300 MG: 300 CAPSULE ORAL at 17:58

## 2021-04-20 RX ADMIN — FUROSEMIDE 20 MG: 10 INJECTION, SOLUTION INTRAMUSCULAR; INTRAVENOUS at 17:43

## 2021-04-20 RX ADMIN — ACETAMINOPHEN 1000 MG: 500 TABLET ORAL at 15:16

## 2021-04-20 RX ADMIN — ACETAMINOPHEN 1000 MG: 500 TABLET ORAL at 05:11

## 2021-04-20 RX ADMIN — FAMOTIDINE 20 MG: 10 INJECTION INTRAVENOUS at 17:43

## 2021-04-20 RX ADMIN — MAGNESIUM HYDROXIDE 30 ML: 400 SUSPENSION ORAL at 09:37

## 2021-04-20 RX ADMIN — GABAPENTIN 300 MG: 300 CAPSULE ORAL at 13:01

## 2021-04-20 RX ADMIN — LAMOTRIGINE 150 MG: 100 TABLET ORAL at 05:11

## 2021-04-20 RX ADMIN — OXYCODONE HYDROCHLORIDE 10 MG: 5 SOLUTION ORAL at 02:05

## 2021-04-20 RX ADMIN — ONDANSETRON 4 MG: 2 INJECTION INTRAMUSCULAR; INTRAVENOUS at 05:41

## 2021-04-20 ASSESSMENT — PAIN DESCRIPTION - PAIN TYPE
TYPE: ACUTE PAIN
TYPE: SURGICAL PAIN
TYPE: ACUTE PAIN
TYPE: SURGICAL PAIN
TYPE: ACUTE PAIN
TYPE: ACUTE PAIN
TYPE: CHRONIC PAIN
TYPE: SURGICAL PAIN
TYPE: SURGICAL PAIN

## 2021-04-20 ASSESSMENT — ENCOUNTER SYMPTOMS
COUGH: 1
DIARRHEA: 0
DIZZINESS: 0
SORE THROAT: 0
SPUTUM PRODUCTION: 0
BACK PAIN: 1
COUGH: 0
INSOMNIA: 1
WHEEZING: 0
HEADACHES: 0
CHILLS: 0
PALPITATIONS: 0
ABDOMINAL PAIN: 1
NAUSEA: 0
ABDOMINAL PAIN: 0
NAUSEA: 1
VOMITING: 0
SHORTNESS OF BREATH: 1
NERVOUS/ANXIOUS: 1
FEVER: 0

## 2021-04-20 NOTE — PROGRESS NOTES
Mobility Progress Note    Surgery patient: yes  Date of surgery: 4/16/21  Ambulated 50 ft on day of surgery? (N/A if patient did not undergo surgery today): n/a  Number of times ambulated 50 feet or greater today: multiple times, over 10 times  Patient has been up to chair, edge of bed or HOB 90 degrees for all meals?: yes  Goal met? (goal is ambulating at least 50 feet 2 times on day shift, one time on night shift): yes  If patient did not meet mobility goal, why?: pt is ambulating often

## 2021-04-20 NOTE — PROGRESS NOTES
Surgical Progress Note    Author: Raphael Thompson P.A.-C. Date & Time created: 2021   7:06 AM     Interval Events:  50 year old female POD #4 Laparoscopic Sleeve Gastrectomy and Left Lobe Liver Biopsy progressing well.     Spiked a fever 48 hrs ago with associated SOB, and consult with medicine was placed for medical management. Lasix was given and patient is feeling much better this AM. She is interested in going home.    VSS. Labs reviewed. 40 of K was given this AM for marginal hypokalemia. VSS improving requiring up to 3L NC. Will trial home O2 evaluation.      She is tolerating FLD without nausea/vomiting. Admits to improving incisional abdominal pain, controlled with medication.     Review of Systems   Constitutional: Negative for chills and fever.   Respiratory: Positive for shortness of breath (improving). Negative for cough.    Cardiovascular: Negative for chest pain.   Gastrointestinal: Positive for abdominal pain and nausea. Negative for diarrhea and vomiting.   Genitourinary: Negative for dysuria.   Skin: Negative for rash.   Neurological: Negative for dizziness.   All other systems reviewed and are negative.    Hemodynamics:  Temp (24hrs), Av.6 °C (97.9 °F), Min:36.1 °C (97 °F), Max:37.1 °C (98.8 °F)  Temperature: 36.2 °C (97.2 °F)  Pulse  Av.5  Min: 70  Max: 115   Blood Pressure: 101/68     Respiratory:    Respiration: 16, Pulse Oximetry: 91 %     Work Of Breathing / Effort: Mild  RUL Breath Sounds: Clear, RML Breath Sounds: Clear, RLL Breath Sounds: Clear;Expiratory Wheezes, ABNER Breath Sounds: Clear, LLL Breath Sounds: Clear;Expiratory Wheezes  Neuro:  GCS       Fluids:    Intake/Output Summary (Last 24 hours) at 2021 0706  Last data filed at 2021 1400  Gross per 24 hour   Intake 240 ml   Output --   Net 240 ml        Current Diet Order   Procedures   • Diet Order Diet: Full Liquid (Bariatric fulls please.  )     Physical Exam  Vitals reviewed.   Constitutional:        General: She is not in acute distress.     Appearance: Normal appearance. She is obese. She is not toxic-appearing.   HENT:      Head: Normocephalic and atraumatic.      Nose: Nose normal.      Mouth/Throat:      Pharynx: Oropharynx is clear.   Eyes:      Extraocular Movements: Extraocular movements intact.      Conjunctiva/sclera: Conjunctivae normal.      Pupils: Pupils are equal, round, and reactive to light.   Cardiovascular:      Rate and Rhythm: Normal rate and regular rhythm.      Pulses: Normal pulses.      Heart sounds: Normal heart sounds.   Pulmonary:      Effort: Pulmonary effort is normal.      Breath sounds: No wheezing.      Comments: On oxygen supplements  Abdominal:      General: Abdomen is flat. Bowel sounds are normal.      Palpations: Abdomen is soft.   Musculoskeletal:         General: No swelling or tenderness. Normal range of motion.      Cervical back: Normal range of motion and neck supple.   Skin:     General: Skin is warm.   Neurological:      General: No focal deficit present.      Mental Status: She is alert and oriented to person, place, and time. Mental status is at baseline.   Psychiatric:         Mood and Affect: Mood normal.         Behavior: Behavior normal.       Labs:  Recent Results (from the past 24 hour(s))   POCT glucose device results    Collection Time: 04/19/21 12:10 PM   Result Value Ref Range    Glucose - Accu-Ck 103 (H) 65 - 99 mg/dL   URINALYSIS    Collection Time: 04/19/21  3:45 PM    Specimen: Urine, Clean Catch   Result Value Ref Range    Color Yellow     Character Clear     Specific Gravity 1.009 <1.035    Ph 7.5 5.0 - 8.0    Glucose Negative Negative mg/dL    Ketones Negative Negative mg/dL    Protein Negative Negative mg/dL    Bilirubin Negative Negative    Urobilinogen, Urine 0.2 Negative    Nitrite Negative Negative    Leukocyte Esterase Negative Negative    Occult Blood Negative Negative    Micro Urine Req see below    LACTIC ACID    Collection Time: 04/19/21   4:27 PM   Result Value Ref Range    Lactic Acid 1.1 0.5 - 2.0 mmol/L   proBrain Natriuretic Peptide, NT    Collection Time: 04/19/21  4:27 PM   Result Value Ref Range    NT-proBNP 363 (H) 0 - 125 pg/mL   POCT glucose device results    Collection Time: 04/19/21  5:49 PM   Result Value Ref Range    Glucose - Accu-Ck 108 (H) 65 - 99 mg/dL   POCT glucose device results    Collection Time: 04/20/21 12:59 AM   Result Value Ref Range    Glucose - Accu-Ck 97 65 - 99 mg/dL   CBC without Differential (blood)    Collection Time: 04/20/21  3:21 AM   Result Value Ref Range    WBC 9.0 4.8 - 10.8 K/uL    RBC 4.26 4.20 - 5.40 M/uL    Hemoglobin 13.9 12.0 - 16.0 g/dL    Hematocrit 42.1 37.0 - 47.0 %    MCV 98.8 (H) 81.4 - 97.8 fL    MCH 32.6 27.0 - 33.0 pg    MCHC 33.0 (L) 33.6 - 35.0 g/dL    RDW 44.9 35.9 - 50.0 fL    Platelet Count 255 164 - 446 K/uL    MPV 10.2 9.0 - 12.9 fL   Comp Metabolic Panel (CMP)    Collection Time: 04/20/21  3:21 AM   Result Value Ref Range    Sodium 133 (L) 135 - 145 mmol/L    Potassium 3.2 (L) 3.6 - 5.5 mmol/L    Chloride 90 (L) 96 - 112 mmol/L    Co2 35 (H) 20 - 33 mmol/L    Anion Gap 8.0 7.0 - 16.0    Glucose 103 (H) 65 - 99 mg/dL    Bun 9 8 - 22 mg/dL    Creatinine 0.86 0.50 - 1.40 mg/dL    Calcium 9.2 8.5 - 10.5 mg/dL    AST(SGOT) 22 12 - 45 U/L    ALT(SGPT) 29 2 - 50 U/L    Alkaline Phosphatase 53 30 - 99 U/L    Total Bilirubin 0.4 0.1 - 1.5 mg/dL    Albumin 4.0 3.2 - 4.9 g/dL    Total Protein 6.9 6.0 - 8.2 g/dL    Globulin 2.9 1.9 - 3.5 g/dL    A-G Ratio 1.4 g/dL   ESTIMATED GFR    Collection Time: 04/20/21  3:21 AM   Result Value Ref Range    GFR If African American >60 >60 mL/min/1.73 m 2    GFR If Non African American >60 >60 mL/min/1.73 m 2   POCT glucose device results    Collection Time: 04/20/21  5:57 AM   Result Value Ref Range    Glucose - Accu-Ck 113 (H) 65 - 99 mg/dL     Medical Decision Making, by Problem:  Active Hospital Problems    Diagnosis    • Fever [R50.9]      Priority: High    • Acute respiratory failure with hypoxia (HCC) [J96.01]    • Obesity (BMI 35.0-39.9 without comorbidity) [E66.9]    • Gastroesophageal reflux disease [K21.9]    • Bipolar affective disorder, currently depressed, mild (HCC) [F31.31]    • Essential hypertension [I10]      Plan:    Pt is alert and oriented, NAD. Breathing unlabored and improving o2 saturation requiring 0-3L NC. SOB improving. CT negative for PE, procalcitonin negative, covid negative.    Tolerating PO.  Incisions ok. VS stable. Labs stable - did replete Potassium with 40 of K. Encouraged ambulation and incentive spirometry.     We greatly appreciate medicine for their expertise. Plan of care discussed with Dr. Pickett. We will communicate with hospitalist for discharge planning.     Quality Measures:  Quality-Core Measures    Discussed patient condition with Patient and Dr. Pickett

## 2021-04-20 NOTE — FACE TO FACE
"Face to Face Note  -  Durable Medical Equipment    Raphael Thompson P.A.-C. - NPI: [id number]  I certify that this patient is under my care and that they had a durable medical equipment(DME)face to face encounter by the physician assistant working collaboratively with me that meets the physician DME face-to-face encounter requirements with this patient on:    Date of encounter:   Patient:                    MRN:                       YOB: 2019  Margareth SMITH  [id number]  1970     The encounter with the patient was in whole, or in part, for the following medical condition, which is the primary reason for durable medical equipment:  Asthma and Post-Op Surgery    I certify that, based on my findings, the following durable medical equipment is medically necessary:  Oxygen.    HOME O2 Saturation Measurements:(Values must be present for Home Oxygen orders)  Room air sat at rest: 82  Room air sat with amb: 72  With liters of O2: 2, O2 sat at rest with O2: 93  With Liters of O2: 4, O2 sat with amb with O2 : 91  Is the patient mobile?: Yes    My Clinical findings support the need for the above equipment due to:  Hypoxia    Supporting Symptoms: The patient requires supplemental oxygen, as the following interventions have been tried with limited or no improvement: \"Incentive spirometry    If patient feels more short of breath, they can go up to 6 liters per minute and contact healthcare provider.  "

## 2021-04-20 NOTE — HOSPITAL COURSE
50 y.o. female with past medical history of asthma, obesity and bipolar, who presented 4/16/2021 with elective lap gastric sleeve surgery. Patient underwent Lap sleeve gastrectomy and left lobe liver biopsy on 4/16. Patient developed spike fever with Temp 102.4 last night and sob requiring 4L NC. She reports having been on IVF since admission. Reports pain in the surgery incision site, no nausea, vomiting, diarrhea. No dysuria. CTA chest showed no evidence of PE. Minimal bilateral pleural effusions and minimal bibasilar atelectasis or pneumonia. Internal medicine is consulted to assist medical managements.

## 2021-04-20 NOTE — PROGRESS NOTES
[facility] Medicine Daily Progress Note    Date of Service  4/20/2021    Chief Complaint  50 y.o. female admitted 4/16/2021 with shortness of breath, cough, fever     Hospital Course  50 y.o. female with past medical history of asthma, obesity and bipolar, who presented 4/16/2021 with elective lap gastric sleeve surgery. Patient underwent Lap sleeve gastrectomy and left lobe liver biopsy on 4/16. Patient developed spike fever with Temp 102.4 last night and sob requiring 4L NC. She reports having been on IVF since admission. Reports pain in the surgery incision site, no nausea, vomiting, diarrhea. No dysuria. CTA chest showed no evidence of PE. Minimal bilateral pleural effusions and minimal bibasilar atelectasis or pneumonia. Internal medicine is consulted to assist medical managements.       Interval Problem Update  Pt feeling better, but still sob. She is on unasyn. IV lasix pt still volume overload     Consultants/Specialty  Surgery general     Code Status  Full Code    Disposition  Inpatient     Review of Systems  Review of Systems   Constitutional: Positive for malaise/fatigue. Negative for chills and fever.   HENT: Negative for sore throat.    Respiratory: Positive for cough and shortness of breath. Negative for sputum production and wheezing.    Cardiovascular: Negative for chest pain, palpitations and leg swelling.   Gastrointestinal: Negative for abdominal pain, diarrhea, nausea and vomiting.   Genitourinary: Negative for urgency.   Musculoskeletal: Positive for back pain.   Neurological: Negative for dizziness and headaches.   Psychiatric/Behavioral: The patient is nervous/anxious and has insomnia.         Physical Exam  Temp:  [36.1 °C (97 °F)-36.9 °C (98.5 °F)] 36.4 °C (97.6 °F)  Pulse:  [79-83] 82  Resp:  [16-20] 18  BP: ()/(61-78) 94/61  SpO2:  [91 %-99 %] 94 %    Physical Exam  Constitutional:       Appearance: Normal appearance. She is obese. She is ill-appearing.   HENT:      Head: Normocephalic  and atraumatic.   Eyes:      General: No scleral icterus.  Cardiovascular:      Rate and Rhythm: Normal rate.      Heart sounds: No murmur.   Pulmonary:      Effort: Pulmonary effort is normal. No respiratory distress.      Breath sounds: Rales present. No wheezing.   Abdominal:      General: There is distension.      Palpations: Abdomen is soft.      Tenderness: There is abdominal tenderness. There is no guarding.   Musculoskeletal:         General: No swelling.   Skin:     General: Skin is dry.      Coloration: Skin is not jaundiced.   Neurological:      General: No focal deficit present.      Mental Status: She is alert and oriented to person, place, and time.   Psychiatric:         Mood and Affect: Mood normal.         Behavior: Behavior normal.         Thought Content: Thought content normal.         Judgment: Judgment normal.         Fluids  No intake or output data in the 24 hours ending 04/20/21 1547    Laboratory  Recent Labs     04/18/21  0603 04/19/21  0552 04/20/21  0321   WBC 10.8 10.9* 9.0   RBC 4.07* 3.95* 4.26   HEMOGLOBIN 13.2 13.0 13.9   HEMATOCRIT 40.5 40.2 42.1   MCV 99.5* 101.8* 98.8*   MCH 32.4 32.9 32.6   MCHC 32.6* 32.3* 33.0*   RDW 46.5 47.2 44.9   PLATELETCT 260 242 255   MPV 10.1 9.8 10.2     Recent Labs     04/18/21  0603 04/19/21  0552 04/20/21  0321   SODIUM 134* 133* 133*   POTASSIUM 3.6 3.8 3.2*   CHLORIDE 98 96 90*   CO2 30 29 35*   GLUCOSE 103* 120* 103*   BUN 9 9 9   CREATININE 0.73 0.79 0.86   CALCIUM 8.4* 8.4* 9.2                   Imaging  CT-ABDOMEN-PELVIS WITH   Final Result      Postsurgical changes status post gastrectomy. No abscess is identified.      Hepatic lesions likely represent multiple hemangiomas, the largest in the left lobe of the liver.      Liver is hypodense compatible with hepatic steatosis.      Status post cholecystectomy.      Mild atherosclerotic plaque.      2.5 cm left ovarian cyst.      Subcutaneous edema in the left abdomen.      Trace pleural fluid  on the right and mild bibasilar atelectasis.      CT-CTA CHEST PULMONARY ARTERY W/ RECONS   Final Result      1.  No evidence of pulmonary embolism.      2.  Minimal bilateral pleural effusions and minimal bibasilar atelectasis or pneumonia.      3.  Cholecystectomy.            DX-CHEST-2 VIEWS   Final Result      1.  Development of minimal perihilar and interstitial opacifications could indicate pneumonitis or bronchitis or edema.      2.  No consolidations identified.           Assessment/Plan  * Acute respiratory failure with hypoxia (HCC)  Assessment & Plan  Post op. Not on home oxygen  Chest CTA personally reviewed: no evidence of PE. Minimal bilateral pleural effusions and bibasilar atelectasis  Procal: normal  Ddx include pneumonitis, pneumonia, atelectasis, fluid overload giving she has been on IVF since admission and wheezing on examination or reactive airway disease. PE ruled out by CTA    Check proBNP  Encourage IS  continue lasix 20mg iv q6h for 2 doses and monitor any improvements  Continue unasyn (fever/hypoxia)   Duoneb  RT protocol  Wean O2 as tolerated    Fever- (present on admission)  Assessment & Plan  Srikanth fever on 4/18 post op  procal normal  Check blood culture, UA NGT   Abdominal CT negative for infection   Continue to switch to augmentin on discharge. 7 days total treatment     Obesity (BMI 35.0-39.9 without comorbidity)  Assessment & Plan  S/p Lap sleeve gastrectomy on 4/16  Life style modifications include plant based diet and regular exercise recommended    Gastroesophageal reflux disease- (present on admission)  Assessment & Plan  Cont pepcid    Essential hypertension- (present on admission)  Assessment & Plan  Cont chlorthalidone    Bipolar affective disorder, currently depressed, mild (HCC)- (present on admission)  Assessment & Plan  Cont Lamotrigine        VTE prophylaxis: lovenox

## 2021-04-20 NOTE — DISCHARGE PLANNING
Anticipated Discharge Disposition:   Home with Home O2    Action:   RN notified CM that pt will need home O2.  Met with pt who signed the choice for [facility].   Faxed choice to [facility].   RN confirmed that pt has no therapy needs. Pt is ambulatory.     Barriers to Discharge:   Medical clearance  Home O2 set up     Plan:   Follow up with [facility]  Update RN and pt.       Addendum: Face to face available but no DME order. Message sent to JIMI BANSAL.

## 2021-04-20 NOTE — DIETARY
Nutrition Services:   RD contacted by nutrition representative and RN regarding pt concern over diet order s/p gastric sleeve surgery on 4/16.     Pt reported that she is not receiving snacks between meals and also is supposed to be receiving a Boost supplement each day.     RD noted full liquid, gastric bypass diet was not ordered correctly. RD updated diet order and added appropriate snacks TID.      RD additionally added 120 ml Boost glucose control and empty cup TID w/ meals.     Pt expressed understanding of needing to take small sips of liquids throughout the day.     RD to monitor per department guidelines.

## 2021-04-20 NOTE — CARE PLAN
Problem: Bowel/Gastric:  Goal: Normal bowel function is maintained or improved  Outcome: PROGRESSING SLOWER THAN EXPECTED     Problem: Respiratory:  Goal: Respiratory status will improve  Outcome: PROGRESSING SLOWER THAN EXPECTED  Intervention: Educate and encourage incentive spirometry usage  Note: Pt encouraged to use IS 10x/hr while awake. IS up to 1500. Pt still requiring O2. See desat flowsheet.      Problem: Bowel/Gastric:  Goal: Will not experience complications related to bowel motility  Intervention: Assess baseline bowel pattern  Note: Hyperactive bowel sounds, abd soft but slightly distended, patient complains of nausea, pt states she is passing flatus. MOM given. Will give suppository this afternoon if no BM. Last BM 4/15/21. Jay BANSAL notified.

## 2021-04-20 NOTE — DISCHARGE PLANNING
Met with pt. Pt lives with BF and she has a friend who will be staying with her also. Pt has been approved for home O2 by [facility]. She is independent with ADLs and IADLs. Pt received an advance directive booklet.       Pt is aware that home O2 has been approved.     Care Transition Team Assessment    Information Source  Orientation : Oriented x 4  Information Given By: Patient  Who is responsible for making decisions for patient? : Patient    Readmission Evaluation  Is this a readmission?: No    Elopement Risk  Legal Hold: No  Ambulatory or Self Mobile in Wheelchair: Yes  Disoriented: No  Psychiatric Symptoms: None  History of Wandering: No  Elopement this Admit: No  Vocalizing Wanting to Leave: No  Displays Behaviors, Body Language Wanting to Leave: No-Not at Risk for Elopement  Elopement Risk: Not at Risk for Elopement    Interdisciplinary Discharge Planning  Does Admitting Nurse Feel This Could be a Complex Discharge?: No  Primary Care Physician: Becky ARRINGTON  Lives with - Patient's Self Care Capacity: Significant Other, Friends  Patient or legal guardian wants to designate a caregiver: No  Support Systems: Friends / Neighbors  Housing / Facility: [address]  Do You Take your Prescribed Medications Regularly: No  Able to Return to Previous ADL's: Yes  Mobility Issues: No  Prior Services: None, Home-Independent  Patient Prefers to be Discharged to:: Home  Assistance Needed: No  Durable Medical Equipment: Not Applicable    Discharge Preparedness  What is your plan after discharge?: Home with help  What are your discharge supports?: Spouse, Other (comment)(friend)  Prior Functional Level: Ambulatory, Drives Self, Independent with Activities of Daily Living, Independent with Medication Management  Difficulity with ADLs: None  Difficulity with IADLs: None    Functional Assesment  Prior Functional Level: Ambulatory, Drives Self, Independent with Activities of Daily Living, Independent with Medication  Management    Finances  Financial Barriers to Discharge: No  Prescription Coverage: Yes    Vision / Hearing Impairment  Right Eye Vision: Impaired, Wears Glasses  Left Eye Vision: Impaired, Wears Glasses    Advance Directive  Advance Directive?: None    Domestic Abuse  Have you ever been the victim of abuse or violence?: No  Physical Abuse or Sexual Abuse: No  Verbal Abuse or Emotional Abuse: No  Possible Abuse/Neglect Reported to:: Not Applicable    Discharge Risks or Barriers  Discharge risks or barriers?: No  Patient risk factors: Other (comment)    Anticipated Discharge Information  Discharge Disposition: Still a Patient (30)

## 2021-04-20 NOTE — PROGRESS NOTES
IV infiltrate with approx 20ml of contrast in left forearm. Hot pack applied & IV dc'd. Pt tolerated well. CT scan completed using 22g in right arm. Pt returned to room via transport.

## 2021-04-20 NOTE — PROGRESS NOTES
0836   Desat study completed. Pt will require home O2. Pt hasn't had a BM since 4/15. MOM and dulcolax suppository ordered. Will MOM this a.m. Pt still complaining of nausea. Pt abd soft, slightly distended, hyperactive bowel sounds, pt states she is passing flatus.     1225   Pt taken to CT.    1400  Pt very tearful because of new O2 requirement and postop complications. Sat with patient and attempted to answer any questions. Dr. Baer notified and at bedside to answer further questions. Dietician notified to speak with patient about full bariatric diet at the hospital. Will continue to provide emotional support and communicate clear plan of care with patient. Home O2 at bedside.

## 2021-04-20 NOTE — DISCHARGE PLANNING
Agency/Facility Name: [facility]  Spoke To: Zarina  Outcome: Patient accepted onto service, will bring portable tank within an hour/hour and a half.     BEHZAD Box notified

## 2021-04-20 NOTE — CARE PLAN
Problem: Communication  Goal: The ability to communicate needs accurately and effectively will improve  Outcome: PROGRESSING AS EXPECTED  Note: Instructed on and encouraged call light use; received understanding of call light; was able to answer questions and attain needed items.      Problem: Safety  Goal: Will remain free from injury  Outcome: PROGRESSING AS EXPECTED  Note: *Non-slip socks on   *Bed locked and in lowest position  *Belongings and call light within reach; educated on call light use and received understanding from patient   *Room free of clutter and spills; no injuries sustained during shift.   Goal: Will remain free from falls  Outcome: PROGRESSING AS EXPECTED  Note: *Non-slip socks on   *Bed locked and in lowest position  *Belongings and call light within reach; educated on call light use and received understanding from patient   *Room free of clutter and spills; no falls sustained during shift.

## 2021-04-20 NOTE — DISCHARGE PLANNING
Received Choice form at 1000  Agency/Facility Name: [facility]  Referral sent per Choice form @ 1935

## 2021-04-21 VITALS
BODY MASS INDEX: 36.88 KG/M2 | HEART RATE: 80 BPM | WEIGHT: 200.4 LBS | TEMPERATURE: 98.1 F | HEIGHT: 62 IN | DIASTOLIC BLOOD PRESSURE: 66 MMHG | SYSTOLIC BLOOD PRESSURE: 93 MMHG | OXYGEN SATURATION: 96 % | RESPIRATION RATE: 18 BRPM

## 2021-04-21 PROBLEM — R05.9 COUGH: Status: RESOLVED | Noted: 2019-02-19 | Resolved: 2021-04-21

## 2021-04-21 PROBLEM — R50.9 FEVER: Status: RESOLVED | Noted: 2021-04-19 | Resolved: 2021-04-21

## 2021-04-21 LAB
ALBUMIN SERPL BCP-MCNC: 3.7 G/DL (ref 3.2–4.9)
ALBUMIN/GLOB SERPL: 1.2 G/DL
ALP SERPL-CCNC: 52 U/L (ref 30–99)
ALT SERPL-CCNC: 22 U/L (ref 2–50)
ANION GAP SERPL CALC-SCNC: 7 MMOL/L (ref 7–16)
AST SERPL-CCNC: 17 U/L (ref 12–45)
BILIRUB SERPL-MCNC: 0.5 MG/DL (ref 0.1–1.5)
BUN SERPL-MCNC: 9 MG/DL (ref 8–22)
CALCIUM SERPL-MCNC: 9.3 MG/DL (ref 8.5–10.5)
CHLORIDE SERPL-SCNC: 91 MMOL/L (ref 96–112)
CO2 SERPL-SCNC: 36 MMOL/L (ref 20–33)
CREAT SERPL-MCNC: 0.84 MG/DL (ref 0.5–1.4)
ERYTHROCYTE [DISTWIDTH] IN BLOOD BY AUTOMATED COUNT: 44.6 FL (ref 35.9–50)
GLOBULIN SER CALC-MCNC: 3 G/DL (ref 1.9–3.5)
GLUCOSE BLD-MCNC: 109 MG/DL (ref 65–99)
GLUCOSE BLD-MCNC: 89 MG/DL (ref 65–99)
GLUCOSE BLD-MCNC: 92 MG/DL (ref 65–99)
GLUCOSE SERPL-MCNC: 97 MG/DL (ref 65–99)
HCT VFR BLD AUTO: 41.7 % (ref 37–47)
HGB BLD-MCNC: 13.8 G/DL (ref 12–16)
MCH RBC QN AUTO: 32.4 PG (ref 27–33)
MCHC RBC AUTO-ENTMCNC: 33.1 G/DL (ref 33.6–35)
MCV RBC AUTO: 97.9 FL (ref 81.4–97.8)
PLATELET # BLD AUTO: 268 K/UL (ref 164–446)
PMV BLD AUTO: 9.8 FL (ref 9–12.9)
POTASSIUM SERPL-SCNC: 3.6 MMOL/L (ref 3.6–5.5)
PROT SERPL-MCNC: 6.7 G/DL (ref 6–8.2)
RBC # BLD AUTO: 4.26 M/UL (ref 4.2–5.4)
SODIUM SERPL-SCNC: 134 MMOL/L (ref 135–145)
WBC # BLD AUTO: 7.9 K/UL (ref 4.8–10.8)

## 2021-04-21 PROCEDURE — 700102 HCHG RX REV CODE 250 W/ 637 OVERRIDE(OP): Performed by: PHYSICIAN ASSISTANT

## 2021-04-21 PROCEDURE — 99239 HOSP IP/OBS DSCHRG MGMT >30: CPT | Performed by: INTERNAL MEDICINE

## 2021-04-21 PROCEDURE — 700111 HCHG RX REV CODE 636 W/ 250 OVERRIDE (IP): Performed by: PHYSICIAN ASSISTANT

## 2021-04-21 PROCEDURE — 36415 COLL VENOUS BLD VENIPUNCTURE: CPT

## 2021-04-21 PROCEDURE — 700111 HCHG RX REV CODE 636 W/ 250 OVERRIDE (IP): Performed by: STUDENT IN AN ORGANIZED HEALTH CARE EDUCATION/TRAINING PROGRAM

## 2021-04-21 PROCEDURE — 700102 HCHG RX REV CODE 250 W/ 637 OVERRIDE(OP): Performed by: INTERNAL MEDICINE

## 2021-04-21 PROCEDURE — A9270 NON-COVERED ITEM OR SERVICE: HCPCS | Performed by: PHYSICIAN ASSISTANT

## 2021-04-21 PROCEDURE — A9270 NON-COVERED ITEM OR SERVICE: HCPCS | Performed by: INTERNAL MEDICINE

## 2021-04-21 PROCEDURE — 700105 HCHG RX REV CODE 258: Performed by: STUDENT IN AN ORGANIZED HEALTH CARE EDUCATION/TRAINING PROGRAM

## 2021-04-21 PROCEDURE — 82962 GLUCOSE BLOOD TEST: CPT

## 2021-04-21 PROCEDURE — 80053 COMPREHEN METABOLIC PANEL: CPT

## 2021-04-21 PROCEDURE — 85027 COMPLETE CBC AUTOMATED: CPT

## 2021-04-21 RX ORDER — AMOXICILLIN AND CLAVULANATE POTASSIUM 875; 125 MG/1; MG/1
1 TABLET, FILM COATED ORAL EVERY 12 HOURS
Status: DISCONTINUED | OUTPATIENT
Start: 2021-04-21 | End: 2021-04-21 | Stop reason: HOSPADM

## 2021-04-21 RX ORDER — AMOXICILLIN AND CLAVULANATE POTASSIUM 875; 125 MG/1; MG/1
1 TABLET, FILM COATED ORAL EVERY 12 HOURS
Qty: 10 TABLET | Refills: 0 | Status: SHIPPED | OUTPATIENT
Start: 2021-04-21 | End: 2021-04-26

## 2021-04-21 RX ORDER — PREDNISONE 20 MG/1
40 TABLET ORAL DAILY
Qty: 10 TABLET | Refills: 0 | Status: SHIPPED | OUTPATIENT
Start: 2021-04-21 | End: 2021-04-26

## 2021-04-21 RX ADMIN — LAMOTRIGINE 150 MG: 100 TABLET ORAL at 05:53

## 2021-04-21 RX ADMIN — AMOXICILLIN AND CLAVULANATE POTASSIUM 1 TABLET: 875; 125 TABLET, FILM COATED ORAL at 07:58

## 2021-04-21 RX ADMIN — CHLORTHALIDONE 25 MG: 25 TABLET ORAL at 05:53

## 2021-04-21 RX ADMIN — SODIUM CHLORIDE 3 G: 900 INJECTION INTRAVENOUS at 05:56

## 2021-04-21 RX ADMIN — FAMOTIDINE 20 MG: 10 INJECTION INTRAVENOUS at 05:54

## 2021-04-21 RX ADMIN — ONDANSETRON 4 MG: 2 INJECTION INTRAMUSCULAR; INTRAVENOUS at 02:05

## 2021-04-21 RX ADMIN — ENOXAPARIN SODIUM 40 MG: 40 INJECTION SUBCUTANEOUS at 05:53

## 2021-04-21 RX ADMIN — ALBUTEROL SULFATE 2 PUFF: 90 AEROSOL, METERED RESPIRATORY (INHALATION) at 12:08

## 2021-04-21 RX ADMIN — FUROSEMIDE 20 MG: 10 INJECTION, SOLUTION INTRAMUSCULAR; INTRAVENOUS at 05:54

## 2021-04-21 RX ADMIN — GABAPENTIN 300 MG: 300 CAPSULE ORAL at 12:01

## 2021-04-21 RX ADMIN — GABAPENTIN 300 MG: 300 CAPSULE ORAL at 05:53

## 2021-04-21 RX ADMIN — ACETAMINOPHEN 1000 MG: 500 TABLET ORAL at 05:53

## 2021-04-21 RX ADMIN — OXYCODONE HYDROCHLORIDE 10 MG: 5 SOLUTION ORAL at 02:03

## 2021-04-21 ASSESSMENT — ENCOUNTER SYMPTOMS
HEARTBURN: 0
COUGH: 0
SHORTNESS OF BREATH: 0
DIARRHEA: 0
CHILLS: 0
FEVER: 0
PALPITATIONS: 0
NAUSEA: 0
VOMITING: 0
ABDOMINAL PAIN: 1

## 2021-04-21 ASSESSMENT — PAIN DESCRIPTION - PAIN TYPE
TYPE: ACUTE PAIN
TYPE: ACUTE PAIN

## 2021-04-21 NOTE — CARE PLAN
Problem: Safety  Goal: Will remain free from injury  Outcome: PROGRESSING AS EXPECTED  Note: Safety precaution in effect. Non skid socks in use. Call light within reach. Reminded patient to call for assist. HOulry rounds in effect. Verbalized understanding.      Problem: Infection  Goal: Will remain free from infection  Outcome: PROGRESSING AS EXPECTED  Note: Implement Standard precaution. Hand washing every encounter & before & after patient care. Verbalized understanding.     Problem: Knowledge Deficit  Goal: Knowledge of disease process/condition, treatment plan, diagnostic tests, and medications will improve  Outcome: PROGRESSING AS EXPECTED  Note: Discussed Plan of care. Discharge instructions. Questions answered. Verbalized understanding.     Problem: Pain Management  Goal: Pain level will decrease to patient's comfort goal  Outcome: PROGRESSING AS EXPECTED  Note: Educated on pain scale. Encourage to verrbalize pain. Will medicate as per MAR..

## 2021-04-21 NOTE — DISCHARGE INSTRUCTIONS
Discharge Instructions    Discharged to home by car with significant other. Discharged via wheelchair, hospital escort: Yes.  Special equipment needed: Oxygen    Be sure to schedule a follow-up appointment with your primary care doctor or any specialists as instructed.     Discharge Plan:        I understand that a diet low in cholesterol, fat, and sodium is recommended for good health. Unless I have been given specific instructions below for another diet, I accept this instruction as my diet prescription.   Other diet: Full liquid diet gastric bypass      Special Instructions: Follow up with Dr Pickett, call for appointment.                                    O2 @ 3l per nasal cannula continuously.    · Is patient discharged on Warfarin / Coumadin?   No     Depression / Suicide Risk    As you are discharged from this [facility] facility, it is important to learn how to keep safe from harming yourself.    Recognize the warning signs:  · Abrupt changes in personality, positive or negative- including increase in energy   · Giving away possessions  · Change in eating patterns- significant weight changes-  positive or negative  · Change in sleeping patterns- unable to sleep or sleeping all the time   · Unwillingness or inability to communicate  · Depression  · Unusual sadness, discouragement and loneliness  · Talk of wanting to die  · Neglect of personal appearance   · Rebelliousness- reckless behavior  · Withdrawal from people/activities they love  · Confusion- inability to concentrate     If you or a loved one observes any of these behaviors or has concerns about self-harm, here's what you can do:  · Talk about it- your feelings and reasons for harming yourself  · Remove any means that you might use to hurt yourself (examples: pills, rope, extension cords, firearm)  · Get professional help from the community (Mental Health, Substance Abuse, psychological counseling)  · Do not be alone:Call your Safe Contact- someone  whom you trust who will be there for you.  · Call your local CRISIS HOTLINE 420-4148 or 465-308-9154  · Call your local Children's Mobile Crisis Response Team [place] (256) 749-2133 or www.TerraPerks  · Call the toll free National Suicide Prevention Hotlines   · National Suicide Prevention Lifeline 278-787-PLVU (2625)  · [place] Hope Line Network 800-SUICIDE (640-1125)        Acute Respiratory Failure, Adult    Acute respiratory failure occurs when there is not enough oxygen passing from your lungs to your body. When this happens, your lungs have trouble removing carbon dioxide from the blood. This causes your blood oxygen level to drop too low as carbon dioxide builds up.  Acute respiratory failure is a medical emergency. It can develop quickly, but it is temporary if treated promptly. Your lung capacity, or how much air your lungs can hold, may improve with time, exercise, and treatment.  What are the causes?  There are many possible causes of acute respiratory failure, including:  · Lung injury.  · Chest injury or damage to the ribs or tissues near the lungs.  · Lung conditions that affect the flow of air and blood into and out of the lungs, such as pneumonia, acute respiratory distress syndrome, and cystic fibrosis.  · Medical conditions, such as strokes or spinal cord injuries, that affect the muscles and nerves that control breathing.  · Blood infection (sepsis).  · Inflammation of the pancreas (pancreatitis).  · A blood clot in the lungs (pulmonary embolism).  · A large-volume blood transfusion.  · Burns.  · Near-drowning.  · Seizure.  · Smoke inhalation.  · Reaction to medicines.  · Alcohol or drug overdose.  What increases the risk?  This condition is more likely to develop in people who have:  · A blocked airway.  · Asthma.  · A condition or disease that damages or weakens the muscles, nerves, bones, or tissues that are involved in breathing.  · A serious infection.  · A health problem that  blocks the unconscious reflex that is involved in breathing, such as hypothyroidism or sleep apnea.  · A lung injury or trauma.  What are the signs or symptoms?  Trouble breathing is the main symptom of acute respiratory failure. Symptoms may also include:  · Rapid breathing.  · Restlessness or anxiety.  · Skin, lips, or fingernails that appear blue (cyanosis).  · Rapid heart rate.  · Abnormal heart rhythms (arrhythmias).  · Confusion or changes in behavior.  · Tiredness or loss of energy.  · Feeling sleepy or having a loss of consciousness.  How is this diagnosed?  Your health care provider can diagnose acute respiratory failure with a medical history and physical exam. During the exam, your health care provider will listen to your heart and check for crackling or wheezing sounds in your lungs. Your may also have tests to confirm the diagnosis and determine what is causing respiratory failure. These tests may include:  · Measuring the amount of oxygen in your blood (pulse oximetry). The measurement comes from a small device that is placed on your finger, earlobe, or toe.  · Other blood tests to measure blood gases and to look for signs of infection.  · Sampling your cerebral spinal fluid or tracheal fluid to check for infections.  · Chest X-ray to look for fluid in spaces that should be filled with air.  · Electrocardiogram (ECG) to look at the heart's electrical activity.  How is this treated?  Treatment for this condition usually takes places in a hospital intensive care unit (ICU). Treatment depends on what is causing the condition. It may include one or more treatments until your symptoms improve. Treatment may include:  · Supplemental oxygen. Extra oxygen is given through a tube in the nose, a face mask, or a shepard.  · A device such as a continuous positive airway pressure (CPAP) or bi-level positive airway pressure (BiPAP or BPAP) machine. This treatment uses mild air pressure to keep the airways open. A mask  or other device will be placed over your nose or mouth. A tube that is connected to a motor will deliver oxygen through the mask.  · Ventilator. This treatment helps move air into and out of the lungs. This may be done with a bag and mask or a machine. For this treatment, a tube is placed in your windpipe (trachea) so air and oxygen can flow to the lungs.  · Extracorporeal membrane oxygenation (ECMO). This treatment temporarily takes over the function of the heart and lungs, supplying oxygen and removing carbon dioxide. ECMO gives the lungs a chance to recover. It may be used if a ventilator is not effective.  · Tracheostomy. This is a procedure that creates a hole in the neck to insert a breathing tube.  · Receiving fluids and medicines.  · Rocking the bed to help breathing.  Follow these instructions at home:  · Take over-the-counter and prescription medicines only as told by your health care provider.  · Return to normal activities as told by your health care provider. Ask your health care provider what activities are safe for you.  · Keep all follow-up visits as told by your health care provider. This is important.  How is this prevented?  Treating infections and medical conditions that may lead to acute respiratory failure can help prevent the condition from developing.  Contact a health care provider if:  · You have a fever.  · Your symptoms do not improve or they get worse.  Get help right away if:  · You are having trouble breathing.  · You lose consciousness.  · Your have cyanosis or turn blue.  · You develop a rapid heart rate.  · You are confused.  These symptoms may represent a serious problem that is an emergency. Do not wait to see if the symptoms will go away. Get medical help right away. Call your local emergency services (911 in the [place] Do not drive yourself to the hospital.  This information is not intended to replace advice given to you by your health care provider. Make sure you discuss any  questions you have with your health care provider.  Document Released: 12/23/2014 Document Revised: 11/30/2018 Document Reviewed: 07/05/2017  [employer] Patient Education © 2020 [employer]        Discharge Instructions per Santa Baer M.D.  Continue antibiotic for 5 days   Follow up with Kati Pickett as scheduled   Follow up with primary care about low oxygen at night, sleep studies   Continue same home medications otherwise     DIET: healthy, as instructed by Dr. Pickett team     ACTIVITY: as tolerated     DIAGNOSIS: acute respiratory failure    Return to ER if worsening shortness of breath, chest pain, fever, confusion, unusual bleeding, severe headache, severe abdominal pain, persistent diarrhea, persistent cough, Oxygen sat lower then 88 %

## 2021-04-21 NOTE — DISCHARGE SUMMARY
Discharge Summary    CHIEF COMPLAINT ON ADMISSION  No chief complaint on file.      Reason for Admission  MORBID OBESITY     Admission Date  4/16/2021    CODE STATUS  Full Code    HPI & HOSPITAL COURSE    50 y.o. female with past medical history of asthma, obesity and bipolar, who presented 4/16/2021 with elective lap gastric sleeve surgery. Patient underwent Lap sleeve gastrectomy and left lobe liver biopsy on 4/16.  Pathology report benign tissue. patient spiked fever with Temp 102.4 on 4/18 and sob requiring 4L NC. She reports having been on IVF since admission. Reports pain in the surgery incision site, no nausea, vomiting, diarrhea. No dysuria. CTA chest showed no evidence of PE. Minimal bilateral pleural effusions and minimal bibasilar atelectasis or pneumonia.  She was started IV Lasix.  Procalcitonin negative x2.  CT abdomen pelvis 4/20/2021 - for acute infection. she was started empiric antibiotics for possible pneumonia/bronchitis. She still requires oxygen.   I did advise pt to continue antibiotic and steroid as prescribed. Continue inhaler at home as prescribed. She will continue to follow up with surgeon Dr. Pickett    Therefore, she is discharged in good and stable condition to home with close outpatient follow-up.    The patient met 2-midnight criteria for an inpatient stay at the time of discharge.    Discharge Date  04/21/2021    FOLLOW UP ITEMS POST DISCHARGE  None     DISCHARGE DIAGNOSES  Principal Problem:    Acute respiratory failure with hypoxia (HCC) POA: Yes  Active Problems:    Bipolar affective disorder, currently depressed, mild (HCC) POA: Yes    Essential hypertension POA: Yes    Gastroesophageal reflux disease POA: Yes    Obesity (BMI 35.0-39.9 without comorbidity) POA: Yes  Resolved Problems:    Fever POA: Yes      FOLLOW UP  Future Appointments   Date Time Provider Department Center   4/28/2021  2:00 PM Eunice Grissom M.D. [facility] JOAQUIN Daily, A.P.R.N.  [facility] [facility]  [address] [place] [id number] [phone]    In 2 weeks      [facility] [place] [id number] [facility] [address]  [facility] [address] [phone]  Call  For oxygen concentrator at discharge.       MEDICATIONS ON DISCHARGE     Medication List      START taking these medications      Instructions   amoxicillin-clavulanate 875-125 MG Tabs  Commonly known as: AUGMENTIN   Take 1 tablet by mouth every 12 hours for 5 days.  Dose: 1 tablet     benzocaine-menthol 15-3.6 MG Lozg  Commonly known as: CEPACOL   Dissolve 1 Lozenge in the mouth every four hours as needed.  Dose: 1 Lozenge     predniSONE 20 MG Tabs  Commonly known as: DELTASONE   Take 2 Tablets by mouth every day for 5 days.  Dose: 40 mg        CONTINUE taking these medications      Instructions   * albuterol 2.5mg/3ml Nebu solution for nebulization  Commonly known as: PROVENTIL   3 mL by Nebulization route every four hours as needed for Shortness of Breath.  Dose: 1 Bullet     * albuterol 108 (90 Base) MCG/ACT Aers inhalation aerosol   Inhale 2 Puffs by mouth every 6 hours as needed for Shortness of Breath.  Dose: 2 Puff     benzonatate 200 MG capsule  Commonly known as: TESSALON   1 CAP BY MOUTH UP TO 3 TIMES A DAY ONLY IF NEEDED FOR COUGH.     chlorthalidone 25 MG Tabs  Commonly known as: HYGROTON   Take 1 Tab by mouth every day.  Dose: 25 mg     fluticasone 50 MCG/ACT nasal spray  Commonly known as: FLONASE   Spray 2 Sprays in nose every day.  Dose: 2 Spray     lamotrigine 150 MG tablet  Commonly known as: LAMICTAL   Take 150 mg by mouth every day.  Dose: 150 mg     metFORMIN 500 MG Tabs  Commonly known as: GLUCOPHAGE   Take 500 mg by mouth every day.  Dose: 500 mg     Nebulizer Compressor Kit   Dispense nebulizer machine and all associated supplies.     omeprazole 40 MG delayed-release capsule  Commonly known as: PRILOSEC   Take 1 Cap by mouth every day.  Dose: 40 mg     scopolamine 1 mg/72hr Pt72  Commonly known as: TRANSDERM-SCOP   Place 1  Patch on the skin every 72 hours.  Dose: 1 Patch     traMADol 50 MG Tabs  Commonly known as: ULTRAM   Take 50 mg by mouth every four hours as needed.  Dose: 50 mg         * This list has 2 medication(s) that are the same as other medications prescribed for you. Read the directions carefully, and ask your doctor or other care provider to review them with you.                Allergies  No Known Allergies    DIET  Orders Placed This Encounter   Procedures   • Diet Order Diet: Full Liquid; Miscellaneous modifications: (optional): Bariatric     Sips with meds and ice chips okay on the day of surgery.     Standing Status:   Standing     Number of Occurrences:   1     Order Specific Question:   Diet:     Answer:   Full Liquid [11]     Order Specific Question:   Miscellaneous modifications: (optional)     Answer:   Bariatric [3]       ACTIVITY  As tolerated.  Weight bearing as tolerated    CONSULTATIONS  Surgeon     PROCEDURES  Gastric by pass     LABORATORY  Lab Results   Component Value Date    SODIUM 134 (L) 04/21/2021    POTASSIUM 3.6 04/21/2021    CHLORIDE 91 (L) 04/21/2021    CO2 36 (H) 04/21/2021    GLUCOSE 97 04/21/2021    BUN 9 04/21/2021    CREATININE 0.84 04/21/2021        Lab Results   Component Value Date    WBC 7.9 04/21/2021    HEMOGLOBIN 13.8 04/21/2021    HEMATOCRIT 41.7 04/21/2021    PLATELETCT 268 04/21/2021        Total time of the discharge process exceeds 35 minutes.

## 2021-04-21 NOTE — PROGRESS NOTES
Surgical Progress Note    Author: Princess Clifford P.A.-C. Date & Time created: 2021   9:33 AM     Interval Events:  POD#5 Sleeve gastrectomy. Improving respiratory. Pt feeling better, but anxious about going home with lots of questions/concerns about the need for home O2, living along, an hour from the area. Questions answered, long talk with patient at the bedside. She is walking and using IS. BM yesterday.   Review of Systems   Constitutional: Negative for chills and fever.   Respiratory: Negative for cough and shortness of breath.    Cardiovascular: Negative for chest pain and palpitations.   Gastrointestinal: Positive for abdominal pain (incisional). Negative for diarrhea, heartburn, nausea and vomiting.   Genitourinary: Negative for dysuria.     Hemodynamics:  Temp (24hrs), Av.6 °C (97.9 °F), Min:36.6 °C (97.8 °F), Max:36.7 °C (98.1 °F)  Temperature: 36.7 °C (98.1 °F)  Pulse  Av  Min: 61  Max: 115   Blood Pressure: (!) 93/66     Respiratory:    Respiration: 18, Pulse Oximetry: 96 %     Work Of Breathing / Effort: Mild  RUL Breath Sounds: Expiratory Wheezes, RML Breath Sounds: Expiratory Wheezes, RLL Breath Sounds: Expiratory Wheezes, ABNER Breath Sounds: Expiratory Wheezes, LLL Breath Sounds: Expiratory Wheezes  Neuro:  GCS       Fluids:  No intake or output data in the 24 hours ending 21 0933     Current Diet Order   Procedures   • Diet Order Diet: Full Liquid; Miscellaneous modifications: (optional): Bariatric     Physical Exam  Constitutional:       Appearance: She is obese.   HENT:      Head: Normocephalic and atraumatic.      Mouth/Throat:      Pharynx: Oropharynx is clear.   Eyes:      Conjunctiva/sclera: Conjunctivae normal.   Cardiovascular:      Rate and Rhythm: Normal rate and regular rhythm.   Pulmonary:      Effort: Pulmonary effort is normal.   Abdominal:      General: There is distension (mild).      Palpations: Abdomen is soft. There is no mass.      Tenderness: There is abdominal  tenderness (incisional). There is no guarding or rebound.   Musculoskeletal:         General: Normal range of motion.      Cervical back: Normal range of motion.   Skin:     General: Skin is warm and dry.      Findings: No erythema or rash.      Comments: Incisions with minimal serosanguinous ooze   Neurological:      Mental Status: She is alert and oriented to person, place, and time.   Psychiatric:         Mood and Affect: Mood normal.       Labs:  Recent Results (from the past 24 hour(s))   POCT glucose device results    Collection Time: 04/20/21 12:07 PM   Result Value Ref Range    Glucose - Accu-Ck 99 65 - 99 mg/dL   POCT glucose device results    Collection Time: 04/20/21  5:57 PM   Result Value Ref Range    Glucose - Accu-Ck 87 65 - 99 mg/dL   POCT glucose device results    Collection Time: 04/20/21 11:58 PM   Result Value Ref Range    Glucose - Accu-Ck 92 65 - 99 mg/dL   CBC without Differential (blood)    Collection Time: 04/21/21  2:25 AM   Result Value Ref Range    WBC 7.9 4.8 - 10.8 K/uL    RBC 4.26 4.20 - 5.40 M/uL    Hemoglobin 13.8 12.0 - 16.0 g/dL    Hematocrit 41.7 37.0 - 47.0 %    MCV 97.9 (H) 81.4 - 97.8 fL    MCH 32.4 27.0 - 33.0 pg    MCHC 33.1 (L) 33.6 - 35.0 g/dL    RDW 44.6 35.9 - 50.0 fL    Platelet Count 268 164 - 446 K/uL    MPV 9.8 9.0 - 12.9 fL   Comp Metabolic Panel (CMP)    Collection Time: 04/21/21  2:25 AM   Result Value Ref Range    Sodium 134 (L) 135 - 145 mmol/L    Potassium 3.6 3.6 - 5.5 mmol/L    Chloride 91 (L) 96 - 112 mmol/L    Co2 36 (H) 20 - 33 mmol/L    Anion Gap 7.0 7.0 - 16.0    Glucose 97 65 - 99 mg/dL    Bun 9 8 - 22 mg/dL    Creatinine 0.84 0.50 - 1.40 mg/dL    Calcium 9.3 8.5 - 10.5 mg/dL    AST(SGOT) 17 12 - 45 U/L    ALT(SGPT) 22 2 - 50 U/L    Alkaline Phosphatase 52 30 - 99 U/L    Total Bilirubin 0.5 0.1 - 1.5 mg/dL    Albumin 3.7 3.2 - 4.9 g/dL    Total Protein 6.7 6.0 - 8.2 g/dL    Globulin 3.0 1.9 - 3.5 g/dL    A-G Ratio 1.2 g/dL   ESTIMATED GFR    Collection  Time: 04/21/21  2:25 AM   Result Value Ref Range    GFR If African American >60 >60 mL/min/1.73 m 2    GFR If Non African American >60 >60 mL/min/1.73 m 2   POCT glucose device results    Collection Time: 04/21/21  6:04 AM   Result Value Ref Range    Glucose - Accu-Ck 89 65 - 99 mg/dL     Medical Decision Making, by Problem:  Active Hospital Problems    Diagnosis    • Acute respiratory failure with hypoxia (HCC) [J96.01]    • Obesity (BMI 35.0-39.9 without comorbidity) [E66.9]    • Gastroesophageal reflux disease [K21.9]    • Bipolar affective disorder, currently depressed, mild (HCC) [F31.31]    • Essential hypertension [I10]      Plan:  Pt is alert and oriented, NAD. Breathing unlabored. Tolerating PO.  Incisions ok. VS stable. Labs reviewed, unremarkable. Encouraged ambulation and incentive spirometry.  Pt okay for discharge home today on home O2 if cleared by Hospitalist. Home O2 supplies at the bedside. Appreciate Hospitalist consulting. Prescriptions sent at preop. Will send prescription for Augmentin. She will be followed closely by our office.  Discussed with Dr. Pickett.      Quality Measures:  Quality-Core Measures   Reviewed items::  Labs reviewed  Salinas catheter::  No Salinas  DVT prophylaxis pharmacological::  Enoxaparin (Lovenox)  DVT prophylaxis - mechanical:  SCDs      Discussed patient condition with Hospitalist, RN, Patient and Dr. Pickett

## 2021-04-24 LAB
BACTERIA BLD CULT: NORMAL
BACTERIA BLD CULT: NORMAL
SIGNIFICANT IND 70042: NORMAL
SIGNIFICANT IND 70042: NORMAL
SITE SITE: NORMAL
SITE SITE: NORMAL
SOURCE SOURCE: NORMAL
SOURCE SOURCE: NORMAL

## 2021-04-28 ENCOUNTER — OFFICE VISIT (OUTPATIENT)
Dept: MEDICAL GROUP | Facility: PHYSICIAN GROUP | Age: 51
End: 2021-04-28
Payer: COMMERCIAL

## 2021-04-28 VITALS
SYSTOLIC BLOOD PRESSURE: 124 MMHG | OXYGEN SATURATION: 95 % | HEART RATE: 101 BPM | DIASTOLIC BLOOD PRESSURE: 82 MMHG | WEIGHT: 187 LBS | RESPIRATION RATE: 16 BRPM | BODY MASS INDEX: 34.41 KG/M2 | HEIGHT: 62 IN | TEMPERATURE: 96.6 F

## 2021-04-28 DIAGNOSIS — E66.9 OBESITY (BMI 30-39.9): ICD-10-CM

## 2021-04-28 DIAGNOSIS — E03.9 HYPOTHYROIDISM, UNSPECIFIED TYPE: ICD-10-CM

## 2021-04-28 DIAGNOSIS — Z23 NEED FOR VACCINATION: ICD-10-CM

## 2021-04-28 DIAGNOSIS — Z12.31 ENCOUNTER FOR SCREENING MAMMOGRAM FOR MALIGNANT NEOPLASM OF BREAST: ICD-10-CM

## 2021-04-28 DIAGNOSIS — F31.31 BIPOLAR AFFECTIVE DISORDER, CURRENTLY DEPRESSED, MILD (HCC): ICD-10-CM

## 2021-04-28 DIAGNOSIS — I10 ESSENTIAL HYPERTENSION: ICD-10-CM

## 2021-04-28 DIAGNOSIS — Z98.84 S/P BARIATRIC SURGERY: ICD-10-CM

## 2021-04-28 PROBLEM — E05.90 HYPERTHYROIDISM: Status: RESOLVED | Noted: 2019-04-09 | Resolved: 2021-04-28

## 2021-04-28 PROCEDURE — 99214 OFFICE O/P EST MOD 30 MIN: CPT | Mod: 25 | Performed by: FAMILY MEDICINE

## 2021-04-28 PROCEDURE — 90471 IMMUNIZATION ADMIN: CPT | Performed by: FAMILY MEDICINE

## 2021-04-28 PROCEDURE — 90732 PPSV23 VACC 2 YRS+ SUBQ/IM: CPT | Performed by: FAMILY MEDICINE

## 2021-04-28 RX ORDER — OMEPRAZOLE 20 MG/1
CAPSULE, DELAYED RELEASE ORAL
COMMUNITY
Start: 2021-04-14 | End: 2021-04-28

## 2021-04-28 RX ORDER — ONDANSETRON 4 MG/1
TABLET, ORALLY DISINTEGRATING ORAL
COMMUNITY
Start: 2021-04-26 | End: 2022-03-04

## 2021-04-28 ASSESSMENT — FIBROSIS 4 INDEX: FIB4 SCORE: 0.65

## 2021-04-28 NOTE — ASSESSMENT & PLAN NOTE
2 weeks ago pt had bariatric surgery gastric sleeve for HTN, hypertriglycerides, prediabetes, obesity. She has already lost 16 lbs from 202 to 186  She is following the diet, she is exercising regularly walking over a mile a day.    at [employer].

## 2021-04-28 NOTE — ASSESSMENT & PLAN NOTE
She in on chlorthalidone 25 mg daily.  BP has improved after gastric sleeve surgery  148/109 before her surgery and diet.   Sodium slightly decreased but stable.

## 2021-04-28 NOTE — ASSESSMENT & PLAN NOTE
Chronic condition,   Followed by psychiatry and behavioral therapy though doctors on demand Dr. Guo

## 2021-04-28 NOTE — ASSESSMENT & PLAN NOTE
Normal tsh on recent labs with 8 months of no thyroid supplement,   Constipation has resolved.   Will resolve this problem

## 2021-04-29 NOTE — PROGRESS NOTES
Subjective:   Margareth SMITH is a 50 y.o. female here today for evaluation and management of:     S/P bariatric surgery  2 weeks ago pt had bariatric surgery gastric sleeve for HTN, hypertriglycerides, prediabetes, obesity. She has already lost 16 lbs from 202 to 186  She is following the diet, she is exercising regularly walking over a mile a day.    at [employer].     Bipolar affective disorder, currently depressed, mild (HCC)  Chronic condition,   Followed by psychiatry and behavioral therapy though doctors on demand Dr. Guo      Essential hypertension  She in on chlorthalidone 25 mg daily.  BP has improved after gastric sleeve surgery  148/109 before her surgery and diet.   Sodium slightly decreased but stable.       Hypothyroidism  Normal tsh on recent labs with 8 months of no thyroid supplement,   Constipation has resolved.   Will resolve this problem           Current medicines (including changes today)  Current Outpatient Medications   Medication Sig Dispense Refill   • ondansetron (ZOFRAN ODT) 4 MG TABLET DISPERSIBLE      • lamotrigine (LAMICTAL) 150 MG tablet Take 150 mg by mouth every day.     • omeprazole (PRILOSEC) 40 MG delayed-release capsule Take 1 Cap by mouth every day. 90 Cap 0   • albuterol 108 (90 Base) MCG/ACT Aero Soln inhalation aerosol Inhale 2 Puffs by mouth every 6 hours as needed for Shortness of Breath. 8.5 g 2   • chlorthalidone (HYGROTON) 25 MG Tab Take 1 Tab by mouth every day. 90 Tab 0   • fluticasone (FLONASE) 50 MCG/ACT nasal spray Spray 2 Sprays in nose every day. 16 g 2     No current facility-administered medications for this visit.     She  has a past medical history of Asthma, Bipolar 1 disorder, depressed (HCC), Heart burn, High cholesterol (04/08/2021), Hypertension (04/08/2021), and Hypothyroidism.    ROS  No chest pain, no shortness of breath, no abdominal pain       Objective:     /82   Pulse (!) 101   Temp 35.9 °C (96.6  "°F) (Temporal)   Resp 16   Ht 1.575 m (5' 2\")   Wt 84.8 kg (187 lb)   SpO2 95%  Body mass index is 34.2 kg/m².   Physical Exam:  Constitutional: Alert, no distress.  Skin: Warm, dry, good turgor, no rashes in visible areas.  Eye: Equal, round and reactive, conjunctiva clear, lids normal.  ENMT: Lips without lesions, good dentition, oropharynx clear.  Neck: Trachea midline, no masses, no thyromegaly. No cervical or supraclavicular lymphadenopathy  Respiratory: Unlabored respiratory effort, lungs clear to auscultation, no wheezes, no ronchi.  Cardiovascular: Normal S1, S2, no murmur, no edema.  Abdomen: Soft, non-tender, no masses, no hepatosplenomegaly.  Psych: Alert and oriented x3, normal affect and mood.        Assessment and Plan:   The following treatment plan was discussed    1. Encounter for screening mammogram for malignant neoplasm of breast    - MA-SCREENING MAMMO BILAT W/CAD; Future    2. Need for vaccination    - Pneumovax Vaccine (PPSV23)    3. S/P bariatric surgery      4. Bipolar affective disorder, currently depressed, mild (HCC)      5. Essential hypertension      6. Hypothyroidism, unspecified type      7. Obesity (BMI 30-39.9)    - Patient identified as having weight management issue.  Appropriate orders and counseling given.      Followup: Return in about 6 months (around 10/28/2021), or if symptoms worsen or fail to improve, for Tuesdays.         "

## 2021-05-13 ENCOUNTER — TELEPHONE (OUTPATIENT)
Dept: MEDICAL GROUP | Facility: PHYSICIAN GROUP | Age: 51
End: 2021-05-13

## 2021-05-13 NOTE — TELEPHONE ENCOUNTER
1. Caller Name: Margareth                        Call Back Number: [phone]      How would the patient prefer to be contacted with a response: Phone call OK to leave a detailed message      Patient is requesting Vitamin deficiency labs(all vitamins if possible)- she had a gastric surgery and feels very lethargic. She'd like this as soon as possible. She also complains of headaches.    Please advise.

## 2021-05-18 DIAGNOSIS — R53.82 CHRONIC FATIGUE: ICD-10-CM

## 2021-05-18 DIAGNOSIS — Z98.84 S/P BARIATRIC SURGERY: ICD-10-CM

## 2021-05-21 ENCOUNTER — HOSPITAL ENCOUNTER (OUTPATIENT)
Dept: LAB | Facility: MEDICAL CENTER | Age: 51
End: 2021-05-21
Attending: FAMILY MEDICINE
Payer: COMMERCIAL

## 2021-05-21 DIAGNOSIS — Z98.84 S/P BARIATRIC SURGERY: ICD-10-CM

## 2021-05-21 DIAGNOSIS — R53.82 CHRONIC FATIGUE: ICD-10-CM

## 2021-05-21 LAB
ALBUMIN SERPL BCP-MCNC: 4.6 G/DL (ref 3.2–4.9)
ALBUMIN/GLOB SERPL: 1.4 G/DL
ALP SERPL-CCNC: 54 U/L (ref 30–99)
ALT SERPL-CCNC: 20 U/L (ref 2–50)
ANION GAP SERPL CALC-SCNC: 12 MMOL/L (ref 7–16)
AST SERPL-CCNC: 26 U/L (ref 12–45)
BASOPHILS # BLD AUTO: 1 % (ref 0–1.8)
BASOPHILS # BLD: 0.07 K/UL (ref 0–0.12)
BILIRUB SERPL-MCNC: 0.7 MG/DL (ref 0.1–1.5)
BUN SERPL-MCNC: 13 MG/DL (ref 8–22)
CALCIUM SERPL-MCNC: 9.9 MG/DL (ref 8.5–10.5)
CHLORIDE SERPL-SCNC: 101 MMOL/L (ref 96–112)
CO2 SERPL-SCNC: 26 MMOL/L (ref 20–33)
CREAT SERPL-MCNC: 0.92 MG/DL (ref 0.5–1.4)
EOSINOPHIL # BLD AUTO: 0.46 K/UL (ref 0–0.51)
EOSINOPHIL NFR BLD: 6.6 % (ref 0–6.9)
ERYTHROCYTE [DISTWIDTH] IN BLOOD BY AUTOMATED COUNT: 45.5 FL (ref 35.9–50)
FOLATE SERPL-MCNC: 14.1 NG/ML
GLOBULIN SER CALC-MCNC: 3.3 G/DL (ref 1.9–3.5)
GLUCOSE SERPL-MCNC: 98 MG/DL (ref 65–99)
HCT VFR BLD AUTO: 52.1 % (ref 37–47)
HGB BLD-MCNC: 17.7 G/DL (ref 12–16)
IMM GRANULOCYTES # BLD AUTO: 0.02 K/UL (ref 0–0.11)
IMM GRANULOCYTES NFR BLD AUTO: 0.3 % (ref 0–0.9)
IRON SATN MFR SERPL: 31 % (ref 15–55)
IRON SERPL-MCNC: 87 UG/DL (ref 40–170)
LYMPHOCYTES # BLD AUTO: 2.57 K/UL (ref 1–4.8)
LYMPHOCYTES NFR BLD: 36.8 % (ref 22–41)
MCH RBC QN AUTO: 32.4 PG (ref 27–33)
MCHC RBC AUTO-ENTMCNC: 34 G/DL (ref 33.6–35)
MCV RBC AUTO: 95.4 FL (ref 81.4–97.8)
MONOCYTES # BLD AUTO: 0.7 K/UL (ref 0–0.85)
MONOCYTES NFR BLD AUTO: 10 % (ref 0–13.4)
NEUTROPHILS # BLD AUTO: 3.16 K/UL (ref 2–7.15)
NEUTROPHILS NFR BLD: 45.3 % (ref 44–72)
NRBC # BLD AUTO: 0 K/UL
NRBC BLD-RTO: 0 /100 WBC
PLATELET # BLD AUTO: 373 K/UL (ref 164–446)
PMV BLD AUTO: 9.6 FL (ref 9–12.9)
POTASSIUM SERPL-SCNC: 3.9 MMOL/L (ref 3.6–5.5)
PROT SERPL-MCNC: 7.9 G/DL (ref 6–8.2)
RBC # BLD AUTO: 5.46 M/UL (ref 4.2–5.4)
SODIUM SERPL-SCNC: 139 MMOL/L (ref 135–145)
TIBC SERPL-MCNC: 278 UG/DL (ref 250–450)
TSH SERPL DL<=0.005 MIU/L-ACNC: 2.27 UIU/ML (ref 0.38–5.33)
UIBC SERPL-MCNC: 191 UG/DL (ref 110–370)
VIT B12 SERPL-MCNC: 674 PG/ML (ref 211–911)
WBC # BLD AUTO: 7 K/UL (ref 4.8–10.8)

## 2021-05-21 PROCEDURE — 36415 COLL VENOUS BLD VENIPUNCTURE: CPT

## 2021-05-21 PROCEDURE — 85025 COMPLETE CBC W/AUTO DIFF WBC: CPT

## 2021-05-21 PROCEDURE — 82607 VITAMIN B-12: CPT

## 2021-05-21 PROCEDURE — 83540 ASSAY OF IRON: CPT

## 2021-05-21 PROCEDURE — 84425 ASSAY OF VITAMIN B-1: CPT

## 2021-05-21 PROCEDURE — 82306 VITAMIN D 25 HYDROXY: CPT

## 2021-05-21 PROCEDURE — 82746 ASSAY OF FOLIC ACID SERUM: CPT

## 2021-05-21 PROCEDURE — 84443 ASSAY THYROID STIM HORMONE: CPT

## 2021-05-21 PROCEDURE — 84630 ASSAY OF ZINC: CPT

## 2021-05-21 PROCEDURE — 80053 COMPREHEN METABOLIC PANEL: CPT

## 2021-05-21 PROCEDURE — 83550 IRON BINDING TEST: CPT

## 2021-05-21 PROCEDURE — 82525 ASSAY OF COPPER: CPT

## 2021-05-24 LAB — 25(OH)D3 SERPL-MCNC: 30 NG/ML (ref 30–80)

## 2021-05-25 LAB — ZINC SERPL-MCNC: 87.2 UG/DL (ref 60–120)

## 2021-05-26 LAB — COPPER SERPL-MCNC: 149.7 UG/DL (ref 80–155)

## 2021-05-27 LAB — VIT B1 BLD-MCNC: 163 NMOL/L (ref 70–180)

## 2021-06-07 ENCOUNTER — TELEPHONE (OUTPATIENT)
Dept: MEDICAL GROUP | Facility: PHYSICIAN GROUP | Age: 51
End: 2021-06-07

## 2021-07-01 RX ORDER — CHLORTHALIDONE 25 MG/1
25 TABLET ORAL DAILY
Qty: 90 TABLET | Refills: 3 | Status: SHIPPED | OUTPATIENT
Start: 2021-07-01 | End: 2022-05-19 | Stop reason: SDUPTHER

## 2021-09-25 ENCOUNTER — APPOINTMENT (OUTPATIENT)
Dept: URGENT CARE | Facility: PHYSICIAN GROUP | Age: 51
End: 2021-09-25
Payer: COMMERCIAL

## 2021-12-29 DIAGNOSIS — R09.82 PND (POST-NASAL DRIP): ICD-10-CM

## 2021-12-29 RX ORDER — OMEPRAZOLE 40 MG/1
40 CAPSULE, DELAYED RELEASE ORAL DAILY
Qty: 90 CAPSULE | Refills: 3 | Status: SHIPPED | OUTPATIENT
Start: 2021-12-29 | End: 2022-05-19 | Stop reason: SDUPTHER

## 2021-12-29 RX ORDER — FLUTICASONE PROPIONATE 50 MCG
2 SPRAY, SUSPENSION (ML) NASAL DAILY
Qty: 16 G | Refills: 2 | Status: SHIPPED | OUTPATIENT
Start: 2021-12-29 | End: 2022-03-16

## 2021-12-29 NOTE — TELEPHONE ENCOUNTER
Received request via: Patient    Was the patient seen in the last year in this department? Yes    Does the patient have an active prescription (recently filled or refills available) for medication(s) requested? No     Last Office Visit;04/28/2021  Last Labs:05/21/2021

## 2022-01-12 ENCOUNTER — HOSPITAL ENCOUNTER (OUTPATIENT)
Dept: LAB | Facility: MEDICAL CENTER | Age: 52
End: 2022-01-12
Attending: PHYSICIAN ASSISTANT
Payer: COMMERCIAL

## 2022-01-12 LAB
25(OH)D3 SERPL-MCNC: 31 NG/ML (ref 30–100)
ALBUMIN SERPL BCP-MCNC: 4.6 G/DL (ref 3.2–4.9)
ALBUMIN/GLOB SERPL: 1.5 G/DL
ALP SERPL-CCNC: 55 U/L (ref 30–99)
ALT SERPL-CCNC: 14 U/L (ref 2–50)
ANION GAP SERPL CALC-SCNC: 13 MMOL/L (ref 7–16)
AST SERPL-CCNC: 18 U/L (ref 12–45)
BASOPHILS # BLD AUTO: 0.7 % (ref 0–1.8)
BASOPHILS # BLD: 0.05 K/UL (ref 0–0.12)
BILIRUB SERPL-MCNC: 0.5 MG/DL (ref 0.1–1.5)
BUN SERPL-MCNC: 21 MG/DL (ref 8–22)
CALCIUM SERPL-MCNC: 9.8 MG/DL (ref 8.5–10.5)
CHLORIDE SERPL-SCNC: 100 MMOL/L (ref 96–112)
CHOLEST SERPL-MCNC: 352 MG/DL (ref 100–199)
CO2 SERPL-SCNC: 24 MMOL/L (ref 20–33)
CREAT SERPL-MCNC: 0.87 MG/DL (ref 0.5–1.4)
EOSINOPHIL # BLD AUTO: 0.21 K/UL (ref 0–0.51)
EOSINOPHIL NFR BLD: 2.9 % (ref 0–6.9)
ERYTHROCYTE [DISTWIDTH] IN BLOOD BY AUTOMATED COUNT: 42.7 FL (ref 35.9–50)
FASTING STATUS PATIENT QL REPORTED: NORMAL
FERRITIN SERPL-MCNC: 276 NG/ML (ref 10–291)
FOLATE SERPL-MCNC: 16.8 NG/ML
GLOBULIN SER CALC-MCNC: 3 G/DL (ref 1.9–3.5)
GLUCOSE SERPL-MCNC: 107 MG/DL (ref 65–99)
HCT VFR BLD AUTO: 50.2 % (ref 37–47)
HDLC SERPL-MCNC: 38 MG/DL
HGB BLD-MCNC: 17.9 G/DL (ref 12–16)
IMM GRANULOCYTES # BLD AUTO: 0.02 K/UL (ref 0–0.11)
IMM GRANULOCYTES NFR BLD AUTO: 0.3 % (ref 0–0.9)
IRON SERPL-MCNC: 116 UG/DL (ref 40–170)
LDLC SERPL CALC-MCNC: ABNORMAL MG/DL
LYMPHOCYTES # BLD AUTO: 2.79 K/UL (ref 1–4.8)
LYMPHOCYTES NFR BLD: 38.3 % (ref 22–41)
MCH RBC QN AUTO: 33.6 PG (ref 27–33)
MCHC RBC AUTO-ENTMCNC: 35.7 G/DL (ref 33.6–35)
MCV RBC AUTO: 94.2 FL (ref 81.4–97.8)
MONOCYTES # BLD AUTO: 0.57 K/UL (ref 0–0.85)
MONOCYTES NFR BLD AUTO: 7.8 % (ref 0–13.4)
NEUTROPHILS # BLD AUTO: 3.64 K/UL (ref 2–7.15)
NEUTROPHILS NFR BLD: 50 % (ref 44–72)
NRBC # BLD AUTO: 0 K/UL
NRBC BLD-RTO: 0 /100 WBC
PLATELET # BLD AUTO: 327 K/UL (ref 164–446)
PMV BLD AUTO: 10.2 FL (ref 9–12.9)
POTASSIUM SERPL-SCNC: 3.6 MMOL/L (ref 3.6–5.5)
PREALB SERPL-MCNC: 34 MG/DL (ref 18–38)
PROT SERPL-MCNC: 7.6 G/DL (ref 6–8.2)
RBC # BLD AUTO: 5.33 M/UL (ref 4.2–5.4)
SODIUM SERPL-SCNC: 137 MMOL/L (ref 135–145)
TRANSFERRIN SERPL-MCNC: 209 MG/DL (ref 200–370)
TRIGL SERPL-MCNC: 424 MG/DL (ref 0–149)
VIT B12 SERPL-MCNC: 704 PG/ML (ref 211–911)
WBC # BLD AUTO: 7.3 K/UL (ref 4.8–10.8)

## 2022-01-12 PROCEDURE — 80053 COMPREHEN METABOLIC PANEL: CPT

## 2022-01-12 PROCEDURE — 83036 HEMOGLOBIN GLYCOSYLATED A1C: CPT

## 2022-01-12 PROCEDURE — 85025 COMPLETE CBC W/AUTO DIFF WBC: CPT

## 2022-01-12 PROCEDURE — 82607 VITAMIN B-12: CPT

## 2022-01-12 PROCEDURE — 84252 ASSAY OF VITAMIN B-2: CPT

## 2022-01-12 PROCEDURE — 82728 ASSAY OF FERRITIN: CPT

## 2022-01-12 PROCEDURE — 80061 LIPID PANEL: CPT

## 2022-01-12 PROCEDURE — 84466 ASSAY OF TRANSFERRIN: CPT

## 2022-01-12 PROCEDURE — 82306 VITAMIN D 25 HYDROXY: CPT

## 2022-01-12 PROCEDURE — 84425 ASSAY OF VITAMIN B-1: CPT

## 2022-01-12 PROCEDURE — 84630 ASSAY OF ZINC: CPT

## 2022-01-12 PROCEDURE — 83540 ASSAY OF IRON: CPT

## 2022-01-12 PROCEDURE — 36415 COLL VENOUS BLD VENIPUNCTURE: CPT

## 2022-01-12 PROCEDURE — 84134 ASSAY OF PREALBUMIN: CPT

## 2022-01-12 PROCEDURE — 82746 ASSAY OF FOLIC ACID SERUM: CPT

## 2022-01-12 PROCEDURE — 84207 ASSAY OF VITAMIN B-6: CPT

## 2022-01-13 LAB
EST. AVERAGE GLUCOSE BLD GHB EST-MCNC: 117 MG/DL
HBA1C MFR BLD: 5.7 % (ref 4–5.6)

## 2022-01-15 LAB — ZINC BLD-MCNC: 552.2 UG/DL (ref 440–860)

## 2022-01-20 LAB — VIT B6 SERPL-MCNC: 156.6 NMOL/L (ref 20–125)

## 2022-01-21 LAB
VIT B1 BLD-MCNC: 191 NMOL/L (ref 70–180)
VIT B2 SERPL-SCNC: 19 NMOL/L (ref 5–50)

## 2022-01-28 ENCOUNTER — HOSPITAL ENCOUNTER (OUTPATIENT)
Dept: LAB | Facility: MEDICAL CENTER | Age: 52
End: 2022-01-28
Attending: FAMILY MEDICINE
Payer: COMMERCIAL

## 2022-01-28 ENCOUNTER — E-CONSULT (OUTPATIENT)
Dept: HEMATOLOGY ONCOLOGY | Facility: MEDICAL CENTER | Age: 52
End: 2022-01-28

## 2022-01-28 ENCOUNTER — OFFICE VISIT (OUTPATIENT)
Dept: MEDICAL GROUP | Facility: PHYSICIAN GROUP | Age: 52
End: 2022-01-28
Payer: COMMERCIAL

## 2022-01-28 VITALS
HEIGHT: 62 IN | WEIGHT: 161 LBS | SYSTOLIC BLOOD PRESSURE: 122 MMHG | OXYGEN SATURATION: 94 % | RESPIRATION RATE: 16 BRPM | BODY MASS INDEX: 29.63 KG/M2 | DIASTOLIC BLOOD PRESSURE: 86 MMHG | TEMPERATURE: 97.4 F | HEART RATE: 76 BPM

## 2022-01-28 DIAGNOSIS — E78.2 MIXED HYPERLIPIDEMIA: ICD-10-CM

## 2022-01-28 DIAGNOSIS — D58.2 ELEVATED HEMOGLOBIN (HCC): ICD-10-CM

## 2022-01-28 DIAGNOSIS — Z23 NEED FOR VACCINATION: ICD-10-CM

## 2022-01-28 DIAGNOSIS — Z71.9 ENCOUNTER FOR CONSULTATION: ICD-10-CM

## 2022-01-28 DIAGNOSIS — Z12.31 ENCOUNTER FOR SCREENING MAMMOGRAM FOR BREAST CANCER: ICD-10-CM

## 2022-01-28 DIAGNOSIS — Z82.49 FAMILY HISTORY OF HEART DISEASE: ICD-10-CM

## 2022-01-28 DIAGNOSIS — R42 DIZZINESS: ICD-10-CM

## 2022-01-28 PROBLEM — E66.3 OVERWEIGHT: Status: ACTIVE | Noted: 2022-01-28

## 2022-01-28 PROBLEM — R73.03 PREDIABETES: Status: ACTIVE | Noted: 2022-01-28

## 2022-01-28 PROBLEM — K76.0 FATTY LIVER: Status: ACTIVE | Noted: 2022-01-28

## 2022-01-28 PROBLEM — K59.00 CONSTIPATION: Status: ACTIVE | Noted: 2022-01-28

## 2022-01-28 PROCEDURE — 82668 ASSAY OF ERYTHROPOIETIN: CPT

## 2022-01-28 PROCEDURE — 90471 IMMUNIZATION ADMIN: CPT | Performed by: FAMILY MEDICINE

## 2022-01-28 PROCEDURE — 99214 OFFICE O/P EST MOD 30 MIN: CPT | Mod: 25 | Performed by: FAMILY MEDICINE

## 2022-01-28 PROCEDURE — 90686 IIV4 VACC NO PRSV 0.5 ML IM: CPT | Performed by: FAMILY MEDICINE

## 2022-01-28 PROCEDURE — 36415 COLL VENOUS BLD VENIPUNCTURE: CPT

## 2022-01-28 PROCEDURE — 81270 JAK2 GENE: CPT

## 2022-01-28 PROCEDURE — 99446 NTRPROF PH1/NTRNET/EHR 5-10: CPT | Performed by: INTERNAL MEDICINE

## 2022-01-28 RX ORDER — BUPROPION HYDROCHLORIDE 100 MG/1
TABLET, EXTENDED RELEASE ORAL
COMMUNITY
Start: 2018-12-29 | End: 2022-03-16

## 2022-01-28 RX ORDER — ATORVASTATIN CALCIUM 40 MG/1
40 TABLET, FILM COATED ORAL NIGHTLY
Qty: 90 TABLET | Refills: 3 | Status: SHIPPED | OUTPATIENT
Start: 2022-01-28 | End: 2022-03-16 | Stop reason: SINTOL

## 2022-01-28 ASSESSMENT — FIBROSIS 4 INDEX: FIB4 SCORE: 0.72

## 2022-01-28 NOTE — PROGRESS NOTES
E-Consult Response     After careful review of the patient's information available in the medical record, the following are my findings and recommendations:    Reason for consult:  Elevated H/H    Summary of data reviewed: Hgb 17.9, Hct 50.2 in a 51 female    Recommendations: You ordered the correct tests. Hopefully the way you ordered the NINA 2 they will reflex to CALR and MPL is negative. This is a common consult for me and I am happy to see her. As long as Hgb less than around 19 there is no urgency - I'm booked out a couple of months.    E-Consult Time: 10 minutes were spent with >50% of the total time spent reviewing items outlined in the summary of data reviewed (Use code [id number])    Bhargav Rosado M.D.

## 2022-01-28 NOTE — ASSESSMENT & PLAN NOTE
Elevated H/H,   She has asthma, does not smoke  She has fatigue even after weight loss surgery  She has elevated TG and LDL been on cholesterol medication since age 13    She has family history with father had a heart attack early and  before age 60.   Patient has been feeling dizzy spells and some shortness of breath this does not happen with activity, usually when she is at her desk not when climbing stairs at work.   ekg in clinic today:      She has been told in the past she needs a stress test.     Will restart lipitor 40 mg recheck lipids and cmp,   Check a JAK2   Consult with hematology and cardiology  Will refer for a sleep study.     She had a gastric sleeve done and had significant improvement in weight and no longer obese but her fatigue and lipids have not improved.     Reviewed EKG results for preop before surgery: only non specific ST changes. No other concerns.

## 2022-01-28 NOTE — PROGRESS NOTES
Subjective:   Margareth SMITH is a 51 y.o. female here today for evaluation and management of:     Elevated hemoglobin (HCC)  Elevated H/H,   She has asthma, does not smoke  She has fatigue even after weight loss surgery  She has elevated TG and LDL been on cholesterol medication since age 13    She has family history with father had a heart attack early and  before age 60.   Patient has been feeling dizzy spells and some shortness of breath this does not happen with activity, usually when she is at her desk not when climbing stairs at work.   ekg in clinic today:      She has been told in the past she needs a stress test.     Will restart lipitor 40 mg recheck lipids and cmp,   Check a JAK2   Consult with hematology and cardiology  Will refer for a sleep study.     She had a gastric sleeve done and had significant improvement in weight and no longer obese but her fatigue and lipids have not improved.     Reviewed EKG results for preop before surgery: only non specific ST changes. No other concerns.          Current medicines (including changes today)  Current Outpatient Medications   Medication Sig Dispense Refill   • buPROPion SR (WELLBUTRIN SR) 100 MG TABLET SR 12 HR      • atorvastatin (LIPITOR) 40 MG Tab Take 1 Tablet by mouth every evening. 90 Tablet 3   • fluticasone (FLONASE) 50 MCG/ACT nasal spray Administer 2 Sprays into affected nostril(S) every day. 16 g 2   • omeprazole (PRILOSEC) 40 MG delayed-release capsule Take 1 Capsule by mouth every day. 90 Capsule 3   • chlorthalidone (HYGROTON) 25 MG Tab Take 1 tablet by mouth every day. 90 tablet 3   • ondansetron (ZOFRAN ODT) 4 MG TABLET DISPERSIBLE      • lamotrigine (LAMICTAL) 150 MG tablet Take 150 mg by mouth every day.     • albuterol 108 (90 Base) MCG/ACT Aero Soln inhalation aerosol Inhale 2 Puffs by mouth every 6 hours as needed for Shortness of Breath. 8.5 g 2     No current facility-administered medications for this visit.     She  has a  "past medical history of Asthma, Bipolar 1 disorder, depressed (HCC), Heart burn, High cholesterol (04/08/2021), Hypertension (04/08/2021), and Hypothyroidism.    ROS  No chest pain, no shortness of breath, no abdominal pain       Objective:     /86   Pulse 76   Temp 36.3 °C (97.4 °F) (Temporal)   Resp 16   Ht 1.575 m (5' 2\")   Wt 73 kg (161 lb)   SpO2 94%  Body mass index is 29.45 kg/m².   Physical Exam:  Constitutional: Alert, no distress.  Skin: Warm, dry, good turgor, no rashes in visible areas.  Eye: Equal, round and reactive, conjunctiva clear, lids normal.  ENMT: Lips without lesions, good dentition, oropharynx clear.  Neck: Trachea midline, no masses, no thyromegaly. No cervical or supraclavicular lymphadenopathy  Respiratory: Unlabored respiratory effort, lungs clear to auscultation, no wheezes, no ronchi.  Cardiovascular: Normal S1, S2, no murmur, no edema.  Abdomen: Soft, non-tender, no masses, no hepatosplenomegaly.  Psych: Alert and oriented x3, normal affect and mood.        Assessment and Plan:   The following treatment plan was discussed    1. Need for vaccination    - INFLUENZA VACCINE QUAD INJ (PF)    2. Elevated hemoglobin (HCC)    - JAK2 GENE MUT RFLX CALR RFLX MPL; Future  - E-consult to Hematology/Oncology  - ERYTHROPOIETIN; Future    3. Mixed hyperlipidemia    - Lipid Profile; Future  - REFERRAL TO CARDIOLOGY    4. Family history of heart disease    - REFERRAL TO CARDIOLOGY  - EKG - Clinic Performed    5. Dizziness    - EKG - Clinic Performed      Followup: Return in about 3 months (around 4/28/2022) for elevated H/H.         "

## 2022-01-31 LAB — EPO SERPL-ACNC: 9 MU/ML (ref 4–27)

## 2022-02-03 DIAGNOSIS — R42 DIZZINESS: ICD-10-CM

## 2022-02-03 DIAGNOSIS — I10 ESSENTIAL HYPERTENSION: ICD-10-CM

## 2022-02-03 LAB
JAK2 P.V617F BLD/T QL: ABNORMAL
SPECIMEN SOURCE: ABNORMAL

## 2022-02-04 DIAGNOSIS — D45 POLYCYTHEMIA VERA (HCC): ICD-10-CM

## 2022-02-04 DIAGNOSIS — Z15.89 JAK-2 GENE MUTATION: ICD-10-CM

## 2022-02-04 NOTE — RESULT ENCOUNTER NOTE
Please advise patient that I reviewed lab results.    The lab that I had ordered for her to evaluate the elevated hemoglobin and hematocrit did shows that she has a JAK2 gene mutation that is causing the higher hemoglobin and hematocrit.  This is not any immediate concern or worry, there is treatment for the elevated hemoglobin and hematocrit, the hematologist (blood specialist) had recommended a referral to them.  I have placed this for her they are booked out a few months so they will be able to see her after a few months.    If Margareth would like an appointment with me in clinic to discuss this before her appointment with me in April please help her schedule a sooner appointment.    Eunice Grissom M.D.

## 2022-03-01 ENCOUNTER — TELEPHONE (OUTPATIENT)
Dept: CARDIOLOGY | Facility: MEDICAL CENTER | Age: 52
End: 2022-03-01
Payer: COMMERCIAL

## 2022-03-01 NOTE — TELEPHONE ENCOUNTER
Spoke to pt in regards to obtaining records for NP telemed appointment with [facility]. Per patient has never been treated by a previous cardiologist. Confirmed all recent notes, labs, and cardiac imaging are in Epic. Patient is getting an EKG done tomorrow 3/2/22 for upcoming appt with [facility]. Confirmed with patient appointment date, time, and location.

## 2022-03-02 ENCOUNTER — APPOINTMENT (OUTPATIENT)
Dept: MEDICAL GROUP | Facility: PHYSICIAN GROUP | Age: 52
End: 2022-03-02
Payer: COMMERCIAL

## 2022-03-03 ASSESSMENT — ENCOUNTER SYMPTOMS
ABDOMINAL PAIN: 0
WHEEZING: 0
COUGH: 0
PALPITATIONS: 0
DIARRHEA: 0
NAUSEA: 0
ORTHOPNEA: 0
SYNCOPE: 0
IRREGULAR HEARTBEAT: 0
NEAR-SYNCOPE: 0
VOMITING: 0
PND: 0
SHORTNESS OF BREATH: 0
FEVER: 0
DIZZINESS: 0
DYSPNEA ON EXERTION: 0
NIGHT SWEATS: 0
WEAKNESS: 0
FOCAL WEAKNESS: 0

## 2022-03-03 NOTE — PROGRESS NOTES
Cardiology Initial Consultation Note/Telemdicine Visit Note    Telemedicine: New Patient   This evaluation was conducted via [employer] using secure and encrypted videoconferencing technology. The patient was physically located at [facility] in [place], [place]. The patient was presented by a medical professional at the originating site.   The patient's identity was confirmed and verbal consent was obtained for this telemedicine encounter.    Date of note:    3/4/2022    Primary Care Provider: Eunice Grissom M.D.  Referring Provider: Eunice Grissom M.D.     Patient Name: Margareth Blake   YOB: 1970  MRN:              [id number]    Chief Complaint: Family history of early CAD    History of Present Illness: Ms. Margareth Blake is a 51 y.o. female whose current medical problems include prediabetes, hypertension, dyslipidemia, and hypothyroidism who is here for cardiac consultation for family history of early CAD.    The patient reports feeling well today. The patient reports that she was diagnosed with dyslipidemia since she was in her 20s, and was told that it was due to weight. However, the patient lost about 60 lbs, and continued to have dyslipidemia.  The patient reports that her father and her siblings all had hypercholesterolemia at young ages as well. She denies any chest pain or shortness of breath on exertion. No orthopnea, PND, or leg swelling. No palpitations. No syncope or presyncopal episodes.       Cardiovascular Risk Factors:  1. Smoking status: Former smoker  2. Type II Diabetes Mellitus: Prediabetes, diet controlled  Lab Results   Component Value Date/Time    HBA1C 5.7 (H) 01/12/2022 06:28 AM    HBA1C 5.6 03/15/2021 09:30 AM     3. Hypertension: On medication  4. Dyslipidemia: On statin  Cholesterol,Tot   Date Value Ref Range Status   01/12/2022 352 (H) 100 - 199 mg/dL Final     LDL   Date Value Ref Range Status   01/12/2022 see below <100 mg/dL Final     Comment:      The calculated LDL value is invalid due to the triglyceride  value of >400 mg/dL.       HDL   Date Value Ref Range Status   01/12/2022 38 (A) >=40 mg/dL Final     Triglycerides   Date Value Ref Range Status   01/12/2022 424 (H) 0 - 149 mg/dL Final     5. Family history of early Coronary Artery Disease in a first degree relative (Male less than 55 years of age; Female less than 65 years of age): Father with MI and high cholesterol, sister and brother with high cholesterol  6.  Obesity and/or Metabolic Syndrome: BMI 28.72  7. Sedentary lifestyle: Active    Review of Systems   Constitutional: Negative for fever, malaise/fatigue and night sweats.   Cardiovascular: Negative for chest pain, dyspnea on exertion, irregular heartbeat, leg swelling, near-syncope, orthopnea, palpitations, paroxysmal nocturnal dyspnea and syncope.   Respiratory: Negative for cough, shortness of breath and wheezing.    Gastrointestinal: Negative for abdominal pain, diarrhea, nausea and vomiting.   Neurological: Negative for dizziness, focal weakness and weakness.       All other systems reviewed and are negative.       Current Outpatient Medications   Medication Sig Dispense Refill   • ondansetron (ZOFRAN ODT) 8 MG TABLET DISPERSIBLE DISSOLVE 1 TABLET IN MOUTH EVERY 12 HOURS AS NEEDED FOR NAUSEA FOR VOMITING     • fluconazole (DIFLUCAN) 150 MG tablet TAKE 1 TABLET BY MOUTH AS NEEDED FOR YEAST INFECTION. DO NOT DRINK ALCOHOL FOR 24 HOURS BEFORE TAKING TO 48 HOURS AFTER TAKING THIS MEDICATION.     • amoxicillin-clavulanate (AUGMENTIN) 875-125 MG Tab TAKE 1 TABLET BY MOUTH TWICE DAILY FOR 7 DAYS     • ezetimibe (ZETIA) 10 MG Tab Take 1 Tablet by mouth every day. 90 Tablet 3   • buPROPion SR (WELLBUTRIN-SR) 100 MG TABLET SR 12 HR      • atorvastatin (LIPITOR) 40 MG Tab Take 1 Tablet by mouth every evening. 90 Tablet 3   • fluticasone (FLONASE) 50 MCG/ACT nasal spray Administer 2 Sprays into affected nostril(S) every day. 16 g 2   • omeprazole  "(PRILOSEC) 40 MG delayed-release capsule Take 1 Capsule by mouth every day. 90 Capsule 3   • chlorthalidone (HYGROTON) 25 MG Tab Take 1 tablet by mouth every day. 90 tablet 3   • lamotrigine (LAMICTAL) 150 MG tablet Take 150 mg by mouth every day.     • albuterol 108 (90 Base) MCG/ACT Aero Soln inhalation aerosol Inhale 2 Puffs by mouth every 6 hours as needed for Shortness of Breath. 8.5 g 2     No current facility-administered medications for this visit.         Allergies   Allergen Reactions   • Dust Mite Extract      Other reaction(s): Unknown       Physical Exam:  Ambulatory Vitals  /78 (BP Location: Left arm, Patient Position: Sitting, BP Cuff Size: Adult)   Pulse 81   Temp 36.1 °C (97 °F) (Temporal)   Ht 1.575 m (5' 2\")   Wt 71.2 kg (157 lb)   SpO2 95%    Oxygen Therapy:  Pulse Oximetry: 95 %  BP Readings from Last 4 Encounters:   03/04/22 115/78   01/28/22 122/86   04/28/21 124/82   04/21/21 (!) 93/66       Weight/BMI: Body mass index is 28.72 kg/m².  Wt Readings from Last 4 Encounters:   03/04/22 71.2 kg (157 lb)   01/28/22 73 kg (161 lb)   04/28/21 84.8 kg (187 lb)   04/16/21 90.9 kg (200 lb 6.4 oz)       General: Well appearing and in no apparent distress  Eyes: nl conjunctiva, no icteric sclera  ENT: wearing a mask, normal external appearance of ears  Neck: no visible JVP,  no carotid bruits  Lungs: normal respiratory effort, CTAB  Heart: RRR, no murmurs, no rubs or gallops,  no edema bilateral lower extremities. No LV/RV heave on cardiac palpatation. + bilateral radial pulses.  + bilateral dp pulses.   Abdomen: soft, non tender, non distended, no masses, normal bowel sounds.  No HSM.  Extremities/MSK: no clubbing, no cyanosis  Neurological: No focal sensory deficits  Psychiatric: Appropriate affect, A/O x 3, intact judgement and insight  Skin: Warm extremities      Lab Data Review:  Lab Results   Component Value Date/Time    CHOLSTRLTOT 352 (H) 01/12/2022 06:28 AM    LDL see below 01/12/2022 " 06:28 AM    HDL 38 (A) 01/12/2022 06:28 AM    TRIGLYCERIDE 424 (H) 01/12/2022 06:28 AM       Lab Results   Component Value Date/Time    SODIUM 137 01/12/2022 06:28 AM    POTASSIUM 3.6 01/12/2022 06:28 AM    CHLORIDE 100 01/12/2022 06:28 AM    CO2 24 01/12/2022 06:28 AM    GLUCOSE 107 (H) 01/12/2022 06:28 AM    BUN 21 01/12/2022 06:28 AM    CREATININE 0.87 01/12/2022 06:28 AM     Lab Results   Component Value Date/Time    ALKPHOSPHAT 55 01/12/2022 06:28 AM    ASTSGOT 18 01/12/2022 06:28 AM    ALTSGPT 14 01/12/2022 06:28 AM    TBILIRUBIN 0.5 01/12/2022 06:28 AM      Lab Results   Component Value Date/Time    WBC 7.3 01/12/2022 06:28 AM     Lab Results   Component Value Date/Time    HBA1C 5.7 (H) 01/12/2022 06:28 AM    HBA1C 5.6 03/15/2021 09:30 AM         Cardiac Imaging and Procedures Review:    EKG dated 3/2/2022: My personal interpretation is Sinus rhythm, borderline T wave abnormalities in anterior leads    No prior echocardiogram    Assessment & Plan     1. Dyslipidemia  Referral to Lipid Clinic    CT-CARDIAC SCORING    ezetimibe (ZETIA) 10 MG Tab   2. Other specified personal risk factors, not elsewhere classified  CT-CARDIAC SCORING    ezetimibe (ZETIA) 10 MG Tab   3. Familial hypercholesterolemia  Referral to Lipid Clinic    CT-CARDIAC SCORING    ezetimibe (ZETIA) 10 MG Tab   4. Family history of early CAD  Referral to Lipid Clinic    CT-CARDIAC SCORING    ezetimibe (ZETIA) 10 MG Tab   5. Essential hypertension           Shared Medical Decision Making:    Family history of early CAD  Familial hypercholesterolemia  Dyslipidemia  Prediabetes  -Obtain coronary calcium score given family history of early CAD as well as likely familial hypercholesterolemia  -Continue high intensity statin  -Start ezetimibe 10 mg daily  -Refer the patient to lipid clinic for consideration of PCSK9 inhibitors.     Hypertension  BP well controlled.   -Continue chlorthaldidone 25mg daily    All of the patient's excellent questions were  answered to the best of my knowledge and to her satisfaction.  It was a pleasure seeing Ms. Margareth Blake in my clinic today. Return in about 3 months (around 6/4/2022). Patient is aware to call the cardiology clinic with any questions or concerns.      Se Dorsey MD  [facility], [address]  [address] [address]  Phone: [phone]  Fax: [phone]

## 2022-03-04 ENCOUNTER — TELEPHONE (OUTPATIENT)
Dept: CARDIOLOGY | Facility: MEDICAL CENTER | Age: 52
End: 2022-03-04

## 2022-03-04 ENCOUNTER — TELEMEDICINE2 (OUTPATIENT)
Dept: CARDIOLOGY | Facility: MEDICAL CENTER | Age: 52
End: 2022-03-04
Attending: FAMILY MEDICINE
Payer: COMMERCIAL

## 2022-03-04 VITALS
HEIGHT: 62 IN | OXYGEN SATURATION: 95 % | TEMPERATURE: 97 F | WEIGHT: 157 LBS | SYSTOLIC BLOOD PRESSURE: 115 MMHG | DIASTOLIC BLOOD PRESSURE: 78 MMHG | HEART RATE: 81 BPM | BODY MASS INDEX: 28.89 KG/M2

## 2022-03-04 DIAGNOSIS — I10 ESSENTIAL HYPERTENSION: ICD-10-CM

## 2022-03-04 DIAGNOSIS — E78.5 DYSLIPIDEMIA: ICD-10-CM

## 2022-03-04 DIAGNOSIS — Z82.49 FAMILY HISTORY OF EARLY CAD: ICD-10-CM

## 2022-03-04 DIAGNOSIS — E78.01 FAMILIAL HYPERCHOLESTEROLEMIA: ICD-10-CM

## 2022-03-04 DIAGNOSIS — Z91.89 OTHER SPECIFIED PERSONAL RISK FACTORS, NOT ELSEWHERE CLASSIFIED: ICD-10-CM

## 2022-03-04 PROCEDURE — 99204 OFFICE O/P NEW MOD 45 MIN: CPT | Mod: 95 | Performed by: STUDENT IN AN ORGANIZED HEALTH CARE EDUCATION/TRAINING PROGRAM

## 2022-03-04 RX ORDER — AMOXICILLIN AND CLAVULANATE POTASSIUM 875; 125 MG/1; MG/1
TABLET, FILM COATED ORAL
COMMUNITY
Start: 2022-02-25 | End: 2022-03-16

## 2022-03-04 RX ORDER — FLUCONAZOLE 150 MG/1
TABLET ORAL
COMMUNITY
Start: 2022-02-25 | End: 2022-03-16

## 2022-03-04 RX ORDER — ONDANSETRON 8 MG/1
TABLET, ORALLY DISINTEGRATING ORAL
COMMUNITY
Start: 2022-02-14 | End: 2022-06-03 | Stop reason: SDUPTHER

## 2022-03-04 RX ORDER — EZETIMIBE 10 MG/1
10 TABLET ORAL DAILY
Qty: 90 TABLET | Refills: 3 | Status: SHIPPED | OUTPATIENT
Start: 2022-03-04 | End: 2022-04-28

## 2022-03-04 ASSESSMENT — FIBROSIS 4 INDEX: FIB4 SCORE: 0.72

## 2022-03-04 NOTE — PATIENT INSTRUCTIONS
Schedule appointment with lipid clinic    Start ezetimibe 10mg daily    Schedule coronary calcium CT scan

## 2022-03-07 ENCOUNTER — TELEPHONE (OUTPATIENT)
Dept: VASCULAR LAB | Facility: MEDICAL CENTER | Age: 52
End: 2022-03-07
Payer: COMMERCIAL

## 2022-03-07 NOTE — PROGRESS NOTES
03/11/22    Subjective    Chief Complaint:  Polycythemia    HPI:  51 female referred for consultation by Dr. Grissom because of an elevated H/H with a low level positive NINA 2 mutation. Erythropoietin level is normal. WBC and platelet counts are normal. She is a non smoker although she does have chronic lung issues that she relates to chemical exposures working at [employer] without a respirator. She had a gastric sleeve operation last year and has lost 50 lbs. Does complain of chronic fatigue.    ROS:    Constitutional: No weight loss  Skin: No rash or jaundice  HENT: No change in eyesight or hearing  Cardiovascular:No chest pain or arrythmia  Respiratory:See PI  GI:No nausea, vomiting, diarrhea, constipation  :No dysuria or frequency  Musculoskeletal:No bone or joint pain  Neuro:No sx's of neuropathy  Psych: No complaints    PMH:      Allergies   Allergen Reactions   • Dust Mite Extract      Other reaction(s): Unknown       Past Medical History:   Diagnosis Date   • Asthma    • Bipolar 1 disorder, depressed (HCC)    • Heart burn    • High cholesterol 04/08/2021   • Hypertension 04/08/2021   • Hypothyroidism         Past Surgical History:   Procedure Laterality Date   • MO LAP, CORINNE RESTRICT PROC, LONGITUDINAL GAS*  4/16/2021    Procedure: GASTRECTOMY, SLEEVE, LAPAROSCOPIC;  Surgeon: Houston Pickett M.D.;  Location: SURGERY [facility];  Service: General   • MO UNLISTED LAPAROSCOPIC PROC, LIVER  4/16/2021    Procedure: BIOPSY, LIVER, LAPAROSCOPIC.;  Surgeon: Houston Pickett M.D.;  Location: SURGERY [facility];  Service: General   • HYSTERECTOMY, TOTAL ABDOMINAL          Medications:    Current Outpatient Medications on File Prior to Visit   Medication Sig Dispense Refill   • ondansetron (ZOFRAN ODT) 8 MG TABLET DISPERSIBLE DISSOLVE 1 TABLET IN MOUTH EVERY 12 HOURS AS NEEDED FOR NAUSEA FOR VOMITING     • fluconazole (DIFLUCAN) 150 MG tablet TAKE 1 TABLET BY MOUTH AS NEEDED FOR YEAST INFECTION. DO NOT DRINK ALCOHOL FOR 24  "HOURS BEFORE TAKING TO 48 HOURS AFTER TAKING THIS MEDICATION.     • amoxicillin-clavulanate (AUGMENTIN) 875-125 MG Tab TAKE 1 TABLET BY MOUTH TWICE DAILY FOR 7 DAYS     • ezetimibe (ZETIA) 10 MG Tab Take 1 Tablet by mouth every day. 90 Tablet 3   • buPROPion SR (WELLBUTRIN-SR) 100 MG TABLET SR 12 HR      • atorvastatin (LIPITOR) 40 MG Tab Take 1 Tablet by mouth every evening. 90 Tablet 3   • fluticasone (FLONASE) 50 MCG/ACT nasal spray Administer 2 Sprays into affected nostril(S) every day. 16 g 2   • omeprazole (PRILOSEC) 40 MG delayed-release capsule Take 1 Capsule by mouth every day. 90 Capsule 3   • chlorthalidone (HYGROTON) 25 MG Tab Take 1 tablet by mouth every day. 90 tablet 3   • lamotrigine (LAMICTAL) 150 MG tablet Take 150 mg by mouth every day.     • albuterol 108 (90 Base) MCG/ACT Aero Soln inhalation aerosol Inhale 2 Puffs by mouth every 6 hours as needed for Shortness of Breath. 8.5 g 2     No current facility-administered medications on file prior to visit.       Social History     Tobacco Use   • Smoking status: Former Smoker     Years: 25.00     Types: Cigarettes     Quit date: 3/4/2017     Years since quittin.0   • Smokeless tobacco: Never Used   • Tobacco comment: 3-4 cigarrette per day   Substance Use Topics   • Alcohol use: No        History reviewed. No pertinent family history.     Objective    Vitals:    /64   Pulse 76   Temp 36.4 °C (97.6 °F) (Temporal)   Resp 16   Ht 1.575 m (5' 2.01\")   Wt 71.9 kg (158 lb 6.4 oz)   SpO2 97%   BMI 28.96 kg/m²     Physical Exam:    Appears well-developed and well-nourished. No distress.    Head -  Normocephalic .   Eyes - Pupils are equal. Conjunctivae  normal. No scleral icterus.   Ears - normal hearing  Neck - Normal range of motion. Neck supple. No thyromegaly  Cardiovascular - Normal rate, regular rhythm, normal heart sounds and intact distal pulses. No  gallop, murmur or rub  Pulmonary - Normal breath sounds.  No wheeze, rales or " rhonci  Breast - symmetrical. No mass on indentation. (had mammogram and is being recalled for diagnostics on right)  Abdominal -Soft. No distension, tenderness, organomegaly or mass  Extremities-  No edema or tenderness.    Nodes - No submental, submandibular, preauricular, cervical, axillary or inguinal adenopathy.    Neurological -   Alert and oriented   Skin - Skin is warm and dry. No rash noted. Not diaphoretic. No erythema. No pallor. No jaundice. Multiple tattoos  Psychiatric -  Normal mood and affect.    Labs:  Results for ERIC SMITH (MRN [id number])    Ref. Range 4/21/2021 02:25 5/21/2021 08:14 1/12/2022 06:28   WBC Latest Ref Range: 4.8 - 10.8 K/uL 7.9 7.0 7.3   RBC Latest Ref Range: 4.20 - 5.40 M/uL 4.26 5.46 (H) 5.33   Hemoglobin Latest Ref Range: 12.0 - 16.0 g/dL 13.8 17.7 (H) 17.9 (H)   Hematocrit Latest Ref Range: 37.0 - 47.0 % 41.7 52.1 (H) 50.2 (H)   MCV Latest Ref Range: 81.4 - 97.8 fL 97.9 (H) 95.4 94.2   MCH Latest Ref Range: 27.0 - 33.0 pg 32.4 32.4 33.6 (H)   MCHC Latest Ref Range: 33.6 - 35.0 g/dL 33.1 (L) 34.0 35.7 (H)   RDW Latest Ref Range: 35.9 - 50.0 fL 44.6 45.5 42.7   Platelet Count Latest Ref Range: 164 - 446 K/uL 268 373 327   Results for ERIC SMITH (MRN [id number])    Ref. Range 1/28/2022 07:38   Erythropoietin Latest Ref Range: 4 - 27 mU/mL 9   A JAK2 V617F T-allele is detected in the sample by ddPCR analysis   but is below the quantifiable limits of the test (less than 0.5   percent).     Assessment  Probably early polycythemia vera plus elevated epo due to lung issues  Imp:    Visit Diagnosis:    1. Polycythemia vera (HCC)       Plan:  Monitor for now - may need therapeutic phlebotomies if Hgb > 18.5 or so.  Gastric sleeve may ultimately lead to Fe deficiency - will monitor iron and B12 as well    Bhargav Rosado M.D.

## 2022-03-07 NOTE — TELEPHONE ENCOUNTER
[facility]    Received lipid pharmacotherapy referral from Dr. Dorsey on 3/4/22    1st call  [facility] to establish care    Insurance: [employer]  PCP: [facility]  Locations to be seen: [facility] at [phone], fax [phone]    Karine Soria, PharmD

## 2022-03-08 ENCOUNTER — HOSPITAL ENCOUNTER (OUTPATIENT)
Dept: LAB | Facility: MEDICAL CENTER | Age: 52
End: 2022-03-08
Attending: FAMILY MEDICINE
Payer: COMMERCIAL

## 2022-03-08 DIAGNOSIS — D58.2 ELEVATED HEMOGLOBIN (HCC): ICD-10-CM

## 2022-03-08 DIAGNOSIS — E78.2 MIXED HYPERLIPIDEMIA: ICD-10-CM

## 2022-03-08 LAB
BASOPHILS # BLD AUTO: 0.6 % (ref 0–1.8)
BASOPHILS # BLD: 0.05 K/UL (ref 0–0.12)
CHOLEST SERPL-MCNC: 212 MG/DL (ref 100–199)
EOSINOPHIL # BLD AUTO: 0.28 K/UL (ref 0–0.51)
EOSINOPHIL NFR BLD: 3.2 % (ref 0–6.9)
ERYTHROCYTE [DISTWIDTH] IN BLOOD BY AUTOMATED COUNT: 42.9 FL (ref 35.9–50)
FASTING STATUS PATIENT QL REPORTED: NORMAL
HCT VFR BLD AUTO: 49.3 % (ref 37–47)
HDLC SERPL-MCNC: 36 MG/DL
HGB BLD-MCNC: 17.2 G/DL (ref 12–16)
IMM GRANULOCYTES # BLD AUTO: 0.04 K/UL (ref 0–0.11)
IMM GRANULOCYTES NFR BLD AUTO: 0.5 % (ref 0–0.9)
LDLC SERPL CALC-MCNC: 118 MG/DL
LYMPHOCYTES # BLD AUTO: 2.71 K/UL (ref 1–4.8)
LYMPHOCYTES NFR BLD: 31.3 % (ref 22–41)
MCH RBC QN AUTO: 33.3 PG (ref 27–33)
MCHC RBC AUTO-ENTMCNC: 34.9 G/DL (ref 33.6–35)
MCV RBC AUTO: 95.4 FL (ref 81.4–97.8)
MONOCYTES # BLD AUTO: 0.7 K/UL (ref 0–0.85)
MONOCYTES NFR BLD AUTO: 8.1 % (ref 0–13.4)
NEUTROPHILS # BLD AUTO: 4.89 K/UL (ref 2–7.15)
NEUTROPHILS NFR BLD: 56.3 % (ref 44–72)
NRBC # BLD AUTO: 0 K/UL
NRBC BLD-RTO: 0 /100 WBC
PLATELET # BLD AUTO: 336 K/UL (ref 164–446)
PMV BLD AUTO: 10 FL (ref 9–12.9)
RBC # BLD AUTO: 5.17 M/UL (ref 4.2–5.4)
TRIGL SERPL-MCNC: 288 MG/DL (ref 0–149)
WBC # BLD AUTO: 8.7 K/UL (ref 4.8–10.8)

## 2022-03-08 PROCEDURE — 85025 COMPLETE CBC W/AUTO DIFF WBC: CPT

## 2022-03-08 PROCEDURE — 36415 COLL VENOUS BLD VENIPUNCTURE: CPT

## 2022-03-08 PROCEDURE — 80061 LIPID PANEL: CPT

## 2022-03-10 ENCOUNTER — HOSPITAL ENCOUNTER (OUTPATIENT)
Dept: RADIOLOGY | Facility: MEDICAL CENTER | Age: 52
End: 2022-03-10
Attending: FAMILY MEDICINE
Payer: COMMERCIAL

## 2022-03-10 DIAGNOSIS — Z12.31 ENCOUNTER FOR SCREENING MAMMOGRAM FOR BREAST CANCER: ICD-10-CM

## 2022-03-10 PROCEDURE — 77063 BREAST TOMOSYNTHESIS BI: CPT

## 2022-03-11 ENCOUNTER — OFFICE VISIT (OUTPATIENT)
Dept: HEMATOLOGY ONCOLOGY | Facility: MEDICAL CENTER | Age: 52
End: 2022-03-11
Payer: COMMERCIAL

## 2022-03-11 VITALS
HEART RATE: 76 BPM | DIASTOLIC BLOOD PRESSURE: 64 MMHG | TEMPERATURE: 97.6 F | BODY MASS INDEX: 29.15 KG/M2 | HEIGHT: 62 IN | OXYGEN SATURATION: 97 % | RESPIRATION RATE: 16 BRPM | SYSTOLIC BLOOD PRESSURE: 118 MMHG | WEIGHT: 158.4 LBS

## 2022-03-11 DIAGNOSIS — D45 POLYCYTHEMIA VERA (HCC): ICD-10-CM

## 2022-03-11 PROCEDURE — 99204 OFFICE O/P NEW MOD 45 MIN: CPT | Performed by: INTERNAL MEDICINE

## 2022-03-11 RX ORDER — ATORVASTATIN CALCIUM 40 MG/1
TABLET, FILM COATED ORAL
COMMUNITY
Start: 2022-01-28 | End: 2022-03-16 | Stop reason: SINTOL

## 2022-03-11 ASSESSMENT — FIBROSIS 4 INDEX: FIB4 SCORE: 0.7

## 2022-03-14 ENCOUNTER — HOSPITAL ENCOUNTER (OUTPATIENT)
Dept: RADIOLOGY | Facility: MEDICAL CENTER | Age: 52
End: 2022-03-14
Attending: STUDENT IN AN ORGANIZED HEALTH CARE EDUCATION/TRAINING PROGRAM
Payer: COMMERCIAL

## 2022-03-14 DIAGNOSIS — E78.5 DYSLIPIDEMIA: ICD-10-CM

## 2022-03-14 DIAGNOSIS — Z82.49 FAMILY HISTORY OF EARLY CAD: ICD-10-CM

## 2022-03-14 DIAGNOSIS — E78.01 FAMILIAL HYPERCHOLESTEROLEMIA: ICD-10-CM

## 2022-03-14 DIAGNOSIS — Z91.89 OTHER SPECIFIED PERSONAL RISK FACTORS, NOT ELSEWHERE CLASSIFIED: ICD-10-CM

## 2022-03-14 PROCEDURE — 4410556 CT-CARDIAC SCORING (SELF PAY ONLY)

## 2022-03-15 NOTE — RESULT ENCOUNTER NOTE
Released to magdaleno Zuluaga,  Your triglyceride levels have improved! Continue with current medications and healthy diet changes.   your Hb and hematocrit are still elevated but I see you have an appointment with the hematologist/oncologist 6/13 to follow up.     Eunice Grissom M.D.

## 2022-03-16 DIAGNOSIS — E78.2 MIXED HYPERLIPIDEMIA: ICD-10-CM

## 2022-03-18 ENCOUNTER — NON-PROVIDER VISIT (OUTPATIENT)
Dept: MEDICAL GROUP | Facility: PHYSICIAN GROUP | Age: 52
End: 2022-03-18
Payer: COMMERCIAL

## 2022-03-18 DIAGNOSIS — E78.2 MIXED HYPERLIPIDEMIA: ICD-10-CM

## 2022-03-18 PROCEDURE — 99402 PREV MED CNSL INDIV APPRX 30: CPT | Performed by: FAMILY MEDICINE

## 2022-03-18 NOTE — RESULT ENCOUNTER NOTE
Please advise patient that she is scheduled 3/23/22 for the diagnostic mammogram to have a closer look at some asymmetry in the right breast. Please don't worry, this is just to be completely safe. Eunice Grissom M.D.

## 2022-03-18 NOTE — Clinical Note
Dr. Bloch, Likely familial hypercholesterolemia, but no direct LDL at her highest triglyceride and CHO level.  Also the start of coronary artery disease, but likely underestimates her calcium score due to her age.  Being that she is high risk with a family history I still started a PCSK9 inhibitor as she is not able to tolerate statins, and has been aggressively trying to get her cholesterol down for approximately 20 years Thanks, Kulwinder

## 2022-03-21 ENCOUNTER — TELEPHONE (OUTPATIENT)
Dept: VASCULAR LAB | Facility: MEDICAL CENTER | Age: 52
End: 2022-03-21
Payer: COMMERCIAL

## 2022-03-21 DIAGNOSIS — E78.2 MIXED HYPERLIPIDEMIA: ICD-10-CM

## 2022-03-21 NOTE — TELEPHONE ENCOUNTER
----- Message from Lupe Christensen sent at 3/21/2022  8:12 AM PDT -----  Regarding: RE: New Repatha prescription  Good Morning All,    Can you please place the order in my que?    Thank you,  Lupe    ----- Message -----  From: Fermín Puentes, PharmD  Sent: 3/18/2022   3:03 PM PDT  To: Karine Soria, MerariD, Lupe Christensen  Subject: New Repatha prescription                         Borderline FH, and the start of CAD.  Ordered Repatha 140 every 2 weeks.    Thanks  Kulwinder

## 2022-03-22 ENCOUNTER — DOCUMENTATION (OUTPATIENT)
Dept: VASCULAR LAB | Facility: MEDICAL CENTER | Age: 52
End: 2022-03-22
Payer: COMMERCIAL

## 2022-03-22 RX ORDER — ALIROCUMAB 150 MG/ML
75 INJECTION, SOLUTION SUBCUTANEOUS
Qty: 6 ML | Refills: 3 | Status: SHIPPED | OUTPATIENT
Start: 2022-03-22 | End: 2022-03-23 | Stop reason: SDUPTHER

## 2022-03-22 NOTE — PROGRESS NOTES
Praluent is the preferred formulary medication. Repatha is a plan exclusion. Contacting the clinic on how to proceed.

## 2022-03-23 ENCOUNTER — DOCUMENTATION (OUTPATIENT)
Dept: VASCULAR LAB | Facility: MEDICAL CENTER | Age: 52
End: 2022-03-23
Payer: COMMERCIAL

## 2022-03-23 ENCOUNTER — HOSPITAL ENCOUNTER (OUTPATIENT)
Dept: RADIOLOGY | Facility: MEDICAL CENTER | Age: 52
End: 2022-03-23
Attending: FAMILY MEDICINE
Payer: COMMERCIAL

## 2022-03-23 DIAGNOSIS — R92.8 ABNORMAL MAMMOGRAM: ICD-10-CM

## 2022-03-23 PROCEDURE — 76642 ULTRASOUND BREAST LIMITED: CPT | Mod: RT

## 2022-03-23 PROCEDURE — G0279 TOMOSYNTHESIS, MAMMO: HCPCS | Mod: RT

## 2022-03-23 RX ORDER — ALIROCUMAB 150 MG/ML
150 INJECTION, SOLUTION SUBCUTANEOUS
Qty: 6 ML | Refills: 3 | Status: SHIPPED | OUTPATIENT
Start: 2022-03-23 | End: 2022-06-03 | Stop reason: SDUPTHER

## 2022-03-23 NOTE — RESULT ENCOUNTER NOTE
Released to magdaleno Zuluaga  Your breast ultrasound was normal. Next screening mammogram due in a year.   Please let me know immediately if you notice any new lumps/rashes/pain/nipple discharge.  Eunice Grissom M.D.

## 2022-03-23 NOTE — RESULT ENCOUNTER NOTE
Released to magdaleno Zuluaga  Your mammogram was normal! Next is due in one year.   Please let me know immediately if you notice any new lumps/rashes/pain/nipple discharge.  Eunice Grissom M.D.

## 2022-03-24 ENCOUNTER — DOCUMENTATION (OUTPATIENT)
Dept: VASCULAR LAB | Facility: MEDICAL CENTER | Age: 52
End: 2022-03-24
Payer: COMMERCIAL

## 2022-03-24 ENCOUNTER — PHARMACY VISIT (OUTPATIENT)
Dept: PHARMACY | Facility: MEDICAL CENTER | Age: 52
End: 2022-03-24
Payer: MEDICARE

## 2022-03-24 PROCEDURE — RXMED WILLOW AMBULATORY MEDICATION CHARGE: Performed by: INTERNAL MEDICINE

## 2022-03-24 NOTE — PROGRESS NOTES
My Praluent Copay Card:    Rx BIN# [id number]  Rx PCN# [id number]  Rx GRP# [id number]  ID# [id number]    Debbie Wilburn  Rx Coordinator   [phone]

## 2022-03-24 NOTE — PROGRESS NOTES
ERIC SMITH (Key: [id number]) - [id number]  Praluent 150MG/ML auto-injectors     Status: PA Response - Approved  Approval Dates 3/23/2022 - 3/23/2023

## 2022-03-31 ENCOUNTER — DOCUMENTATION (OUTPATIENT)
Dept: VASCULAR LAB | Facility: MEDICAL CENTER | Age: 52
End: 2022-03-31
Payer: COMMERCIAL

## 2022-03-31 NOTE — PROGRESS NOTES
Spoke with Margareth today.  She started her PCSK9 inhibitor.  First dose was on Saturday.  Tolerated it well, with only mild nausea, she thinks this may have been due to the injection rather than the medication itself.    I scheduled her a follow-up in [place] after her primary care appointment.  She will get labs prior to her next appointment.    Fermín Puentes, PharmD, MS, BCACP, [facility]  Phone: [phone],  Fax: [phone]  On call: [phone]    This note was created using voice recognition software (Dragon). The accuracy of the dictation is limited by the abilities of the software. I have reviewed the note prior to signing, however some errors in grammar and context are still possible. If you have any questions related to this note please do not hesitate to contact our office.

## 2022-04-06 ENCOUNTER — DOCUMENTATION (OUTPATIENT)
Dept: PHARMACY | Facility: MEDICAL CENTER | Age: 52
End: 2022-04-06
Payer: COMMERCIAL

## 2022-04-11 NOTE — PROGRESS NOTES
PHARMACIST FOLLOW UP - **First Cycle**  Confirmed Start Date: 3/26/22     Txt review (med/dose/freq/cycle): Praluent 150mg/ml 1 pen every 14 days   Med Rec/updated Drug list: none   • New Meds: none   • New OTC: none  New changes related to health (allergies, health conditions): none  Unplanned medical visits (ER, Hospitalization): none  Admin tips/Adherence: Praluent 1 pen SC into the top/front of her thigh. Reminded her to continue to rotate injection sites and avoid last site by at least 2 inches.  Current SE: none   • Mgt: n/a  ADLs: no days missed   Review from initial: none  Additional: Had a VERY brief talk with Margareth who shared everything went well with her first injection of Prlauent and her next dose due on 4/9. No new health conditions, meds or side effects to report at this time.

## 2022-04-19 ENCOUNTER — HOSPITAL ENCOUNTER (OUTPATIENT)
Dept: LAB | Facility: MEDICAL CENTER | Age: 52
End: 2022-04-19
Attending: FAMILY MEDICINE
Payer: COMMERCIAL

## 2022-04-19 ENCOUNTER — HOSPITAL ENCOUNTER (OUTPATIENT)
Dept: LAB | Facility: MEDICAL CENTER | Age: 52
End: 2022-04-19
Attending: INTERNAL MEDICINE
Payer: COMMERCIAL

## 2022-04-19 DIAGNOSIS — E78.2 MIXED HYPERLIPIDEMIA: ICD-10-CM

## 2022-04-19 DIAGNOSIS — D45 POLYCYTHEMIA VERA (HCC): ICD-10-CM

## 2022-04-19 LAB
BASOPHILS # BLD AUTO: 0.5 % (ref 0–1.8)
BASOPHILS # BLD: 0.05 K/UL (ref 0–0.12)
CHOLEST SERPL-MCNC: 186 MG/DL (ref 100–199)
EOSINOPHIL # BLD AUTO: 0.28 K/UL (ref 0–0.51)
EOSINOPHIL NFR BLD: 3.1 % (ref 0–6.9)
ERYTHROCYTE [DISTWIDTH] IN BLOOD BY AUTOMATED COUNT: 43.1 FL (ref 35.9–50)
FASTING STATUS PATIENT QL REPORTED: NORMAL
HCT VFR BLD AUTO: 47.3 % (ref 37–47)
HDLC SERPL-MCNC: 40 MG/DL
HGB BLD-MCNC: 16.4 G/DL (ref 12–16)
IMM GRANULOCYTES # BLD AUTO: 0.02 K/UL (ref 0–0.11)
IMM GRANULOCYTES NFR BLD AUTO: 0.2 % (ref 0–0.9)
LDLC SERPL CALC-MCNC: 97 MG/DL
LYMPHOCYTES # BLD AUTO: 2.69 K/UL (ref 1–4.8)
LYMPHOCYTES NFR BLD: 29.5 % (ref 22–41)
MCH RBC QN AUTO: 33.1 PG (ref 27–33)
MCHC RBC AUTO-ENTMCNC: 34.7 G/DL (ref 33.6–35)
MCV RBC AUTO: 95.4 FL (ref 81.4–97.8)
MONOCYTES # BLD AUTO: 0.69 K/UL (ref 0–0.85)
MONOCYTES NFR BLD AUTO: 7.6 % (ref 0–13.4)
NEUTROPHILS # BLD AUTO: 5.39 K/UL (ref 2–7.15)
NEUTROPHILS NFR BLD: 59.1 % (ref 44–72)
NRBC # BLD AUTO: 0 K/UL
NRBC BLD-RTO: 0 /100 WBC
PLATELET # BLD AUTO: 345 K/UL (ref 164–446)
PMV BLD AUTO: 9.7 FL (ref 9–12.9)
RBC # BLD AUTO: 4.96 M/UL (ref 4.2–5.4)
TRIGL SERPL-MCNC: 243 MG/DL (ref 0–149)
WBC # BLD AUTO: 9.1 K/UL (ref 4.8–10.8)

## 2022-04-19 PROCEDURE — 80061 LIPID PANEL: CPT

## 2022-04-19 PROCEDURE — 36415 COLL VENOUS BLD VENIPUNCTURE: CPT

## 2022-04-19 PROCEDURE — 85025 COMPLETE CBC W/AUTO DIFF WBC: CPT

## 2022-04-19 PROCEDURE — 83695 ASSAY OF LIPOPROTEIN(A): CPT

## 2022-04-21 LAB
LDLC SERPL-MCNC: 103 MG/DL (ref 0–129)
LPA SERPL-MCNC: <6 MG/DL

## 2022-04-22 NOTE — RESULT ENCOUNTER NOTE
Released to magdaleno Zuluaga,  Your cholesterol labs have dramatically improved! LDL and lipoprotein are low which is good!  Eunice Grissom M.D.

## 2022-04-28 ENCOUNTER — OFFICE VISIT (OUTPATIENT)
Dept: MEDICAL GROUP | Facility: PHYSICIAN GROUP | Age: 52
End: 2022-04-28
Payer: COMMERCIAL

## 2022-04-28 VITALS
RESPIRATION RATE: 20 BRPM | TEMPERATURE: 97.2 F | SYSTOLIC BLOOD PRESSURE: 118 MMHG | OXYGEN SATURATION: 97 % | HEART RATE: 81 BPM | BODY MASS INDEX: 28.67 KG/M2 | WEIGHT: 161.8 LBS | DIASTOLIC BLOOD PRESSURE: 70 MMHG | HEIGHT: 63 IN

## 2022-04-28 DIAGNOSIS — F31.31 BIPOLAR AFFECTIVE DISORDER, CURRENTLY DEPRESSED, MILD (HCC): ICD-10-CM

## 2022-04-28 DIAGNOSIS — D45 POLYCYTHEMIA VERA (HCC): ICD-10-CM

## 2022-04-28 DIAGNOSIS — Z23 NEED FOR VACCINATION: ICD-10-CM

## 2022-04-28 DIAGNOSIS — E78.1 HYPERTRIGLYCERIDEMIA: ICD-10-CM

## 2022-04-28 DIAGNOSIS — D18.03 LIVER HEMANGIOMA: ICD-10-CM

## 2022-04-28 DIAGNOSIS — R53.82 CHRONIC FATIGUE: ICD-10-CM

## 2022-04-28 PROBLEM — J96.01 ACUTE RESPIRATORY FAILURE WITH HYPOXIA (HCC): Status: RESOLVED | Noted: 2021-04-19 | Resolved: 2022-04-28

## 2022-04-28 PROCEDURE — 90677 PCV20 VACCINE IM: CPT | Performed by: FAMILY MEDICINE

## 2022-04-28 PROCEDURE — 99214 OFFICE O/P EST MOD 30 MIN: CPT | Mod: 25 | Performed by: FAMILY MEDICINE

## 2022-04-28 PROCEDURE — 90471 IMMUNIZATION ADMIN: CPT | Performed by: FAMILY MEDICINE

## 2022-04-28 RX ORDER — ATOMOXETINE 18 MG/1
18 CAPSULE ORAL EVERY MORNING
COMMUNITY
Start: 2022-04-06 | End: 2022-05-13

## 2022-04-28 RX ORDER — TRAZODONE HYDROCHLORIDE 50 MG/1
50 TABLET ORAL NIGHTLY
Qty: 90 TABLET | Refills: 3 | Status: SHIPPED | OUTPATIENT
Start: 2022-04-28 | End: 2022-05-19 | Stop reason: SDUPTHER

## 2022-04-28 RX ORDER — CLOSTRIDIUM TETANI TOXOID ANTIGEN (FORMALDEHYDE INACTIVATED), CORYNEBACTERIUM DIPHTHERIAE TOXOID ANTIGEN (FORMALDEHYDE INACTIVATED), BORDETELLA PERTUSSIS TOXOID ANTIGEN (GLUTARALDEHYDE INACTIVATED), BORDETELLA PERTUSSIS FILAMENTOUS HEMAGGLUTININ ANTIGEN (FORMALDEHYDE INACTIVATED), BORDETELLA PERTUSSIS PERTACTIN ANTIGEN, AND BORDETELLA PERTUSSIS FIMBRIAE 2/3 ANTIGEN 5; 2; 2.5; 5; 3; 5 [LF]/.5ML; [LF]/.5ML; UG/.5ML; UG/.5ML; UG/.5ML; UG/.5ML
0.5 INJECTION, SUSPENSION INTRAMUSCULAR ONCE
Qty: 0.5 ML | Refills: 0 | Status: SHIPPED
Start: 2022-04-28 | End: 2022-04-28

## 2022-04-28 RX ORDER — OMEPRAZOLE 20 MG/1
CAPSULE, DELAYED RELEASE ORAL
COMMUNITY
Start: 2022-04-08 | End: 2022-04-28

## 2022-04-28 ASSESSMENT — FIBROSIS 4 INDEX: FIB4 SCORE: 0.68

## 2022-04-28 NOTE — PROGRESS NOTES
Subjective:   Margareth Blake is a 51 y.o. female here today for evaluation and management of:     Polycythemia vera (HCC)  Followed by oncology  Her H/H has dropped down closer to normal  She does not smoke  She will have monitoring by oncology every 3 months    Liver hemangioma  She has CT in the past and also CT cardiac scoring showed liver hemangiomas. Reassurance provided regarding benign nature of these.     Hypertriglyceridemia  Significant improvement of TG levels with praluent.   Followed by pharmacology team and very grateful to them.   She is making good healthy diet choices, low in processed foods. She premakes her meals for work.          Current medicines (including changes today)  Current Outpatient Medications   Medication Sig Dispense Refill   • atomoxetine (STRATTERA) 18 MG capsule Take 18 mg by mouth every morning.     • Alirocumab (PRALUENT) 150 MG/ML Solution Auto-injector Inject 150 mg under the skin every 14 days. 6 mL 3   • ondansetron (ZOFRAN ODT) 8 MG TABLET DISPERSIBLE DISSOLVE 1 TABLET IN MOUTH EVERY 12 HOURS AS NEEDED FOR NAUSEA FOR VOMITING     • omeprazole (PRILOSEC) 40 MG delayed-release capsule Take 1 Capsule by mouth every day. 90 Capsule 3   • chlorthalidone (HYGROTON) 25 MG Tab Take 1 tablet by mouth every day. 90 tablet 3   • lamotrigine (LAMICTAL) 150 MG tablet Take 150 mg by mouth every day.     • albuterol 108 (90 Base) MCG/ACT Aero Soln inhalation aerosol Inhale 2 Puffs by mouth every 6 hours as needed for Shortness of Breath. 8.5 g 2   • omeprazole (PRILOSEC) 20 MG delayed-release capsule TAKE 1 CAPSULE BY MOUTH ONCE DAILY IN THE MORNING 30 MINUTES BEFORE BREAKFAST. (Patient not taking: Reported on 4/28/2022)       No current facility-administered medications for this visit.     She  has a past medical history of Asthma, Bipolar 1 disorder, depressed (HCC), Heart burn, High cholesterol (04/08/2021), Hypertension (04/08/2021), and Hypothyroidism.    ROS  No chest  "pain, no shortness of breath, no abdominal pain       Objective:     /70 (BP Location: Right arm, Patient Position: Sitting, BP Cuff Size: Adult)   Pulse 81   Temp 36.2 °C (97.2 °F) (Temporal)   Resp 20   Ht 1.588 m (5' 2.5\")   Wt 73.4 kg (161 lb 12.8 oz)   SpO2 97%  Body mass index is 29.12 kg/m².   Physical Exam:  Constitutional: Alert, no distress.  Skin: Warm, dry, good turgor, no rashes in visible areas.  Eye: Equal, round and reactive, conjunctiva clear, lids normal.  ENMT: Lips without lesions, good dentition, oropharynx clear.  Neck: Trachea midline, no masses, no thyromegaly. No cervical or supraclavicular lymphadenopathy  Respiratory: Unlabored respiratory effort, lungs clear to auscultation, no wheezes, no ronchi.  Cardiovascular: Normal S1, S2, no murmur, no edema.  Abdomen: Soft, non-tender, no masses, no hepatosplenomegaly.  Psych: Alert and oriented x3, normal affect and mood.        Assessment and Plan:   The following treatment plan was discussed    1. Polycythemia vera (HCC)      2. Liver hemangioma      3. Hypertriglyceridemia        Followup: No follow-ups on file.         "

## 2022-04-28 NOTE — ASSESSMENT & PLAN NOTE
She is on lamictal 150 mg daily. She has a lot of fatigue, tried a half tab of lamictal, helped a bit with her fatigue but had a lot of panic attacks so back on 150 mg daily.   Reviewed no anemia (has PV), normal vitd, normal tsh, normal electrolytes.   She has trouble staying asleep. She wakes frequently.   She has one cup of coffee am and some tea during the day. She does not drink alcohol or eat spicy foods. Usually would sleep 5 hrs but now even less. She feels exhausted waking up.     She was ambien in the past, and seroquel. she would wake up eating chocolate bunny and grape juice and gained 20 lbs.

## 2022-04-28 NOTE — ASSESSMENT & PLAN NOTE
She has CT in the past and also CT cardiac scoring showed liver hemangiomas. Reassurance provided regarding benign nature of these.

## 2022-04-28 NOTE — ASSESSMENT & PLAN NOTE
Significant improvement of TG levels with praluent.   Followed by pharmacology team and very grateful to them.   She is making good healthy diet choices, low in processed foods. She premakes her meals for work.

## 2022-04-28 NOTE — ASSESSMENT & PLAN NOTE
Followed by oncology  Her H/H has dropped down closer to normal  She does not smoke  She will have monitoring by oncology every 3 months

## 2022-05-06 ENCOUNTER — NON-PROVIDER VISIT (OUTPATIENT)
Dept: MEDICAL GROUP | Facility: PHYSICIAN GROUP | Age: 52
End: 2022-05-06
Payer: COMMERCIAL

## 2022-05-06 VITALS
SYSTOLIC BLOOD PRESSURE: 118 MMHG | DIASTOLIC BLOOD PRESSURE: 84 MMHG | HEIGHT: 63 IN | WEIGHT: 157 LBS | HEART RATE: 87 BPM | BODY MASS INDEX: 27.82 KG/M2

## 2022-05-06 DIAGNOSIS — E78.2 MIXED HYPERLIPIDEMIA: ICD-10-CM

## 2022-05-06 PROCEDURE — 99401 PREV MED CNSL INDIV APPRX 15: CPT | Performed by: FAMILY MEDICINE

## 2022-05-06 ASSESSMENT — FIBROSIS 4 INDEX: FIB4 SCORE: 0.68

## 2022-05-06 NOTE — PROGRESS NOTES
[facility] - Initial Visit  Date of Service: 05/06/22  Time 125-135pm     Eric Smith has been referred for evaluation and management of dyslipidemia    Referral Source: Dr. Dorsey     Subjective    HPI  History of ASCVD:   Possible due to CAC of 9.3, and likely this underestimates her degree of plaque due to her age.  She is in the 75th percentile.  Other Established (non-atherosclerotic) Vascular Disease, if Present: None  Age at Initial Diagnosis of Dyslipidemia: 20s    Current Prescription Lipid Lowering Medications - including dose:   Statin: None  Non-Statin:  Praluent 150 mg every 14 days  Current Lipid Lowering and Related Supplements:   Vitamin D  Any Current Side Effects Potentially Related to Lipid Lowering therapy?   no  Current Adherence to Lipid Lowering Therapies   complete  Previously Attempted Interventions for Lipids - including outcome  Statin: Crestor 20mg, and Lipitor 40mg    Outcome: muscle pain, N/V, vertigo   Non-Statin: lopid 600mg    Outcome: cannot remember, but thinks it may have made her myalgias worse  Any Previous History of Statin Intolerance?   Yes, Crestor and Lipitor. Also muscle pain with Zetia and Lopid  Baseline Lipids Prior to Treatment:   Results for ERIC SMITH (MRN [id number]) as of 3/18/2022 13:47   Ref. Range 1/12/2022 06:28   Cholesterol,Tot Latest Ref Range: 100 - 199 mg/dL 352 (H)   Triglycerides Latest Ref Range: 0 - 149 mg/dL 424 (H)   HDL Latest Ref Range: >=40 mg/dL 38 (A)   LDL Latest Ref Range: <100 mg/dL see below     Results for ERIC SMITH (MRN [id number]) as of 5/6/2022 13:26   Ref. Range 4/19/2022 06:30   Lipoprotein (a) Latest Ref Range: <=29 mg/dL <6       Other Pertinent History: no    History of other CV risk factors: family history     PAST MEDICAL HISTORY:  has a past medical history of Asthma, Bipolar 1 disorder, depressed (HCC), Heart burn, High cholesterol (04/08/2021), Hypertension (04/08/2021), and  "Hypothyroidism.    PAST SURGICAL HISTORY:  has a past surgical history that includes hysterectomy, total abdominal; pr lap, pedro restrict proc, longitudinal gas* (2021); and pr unlisted laparoscopic proc, liver (2021).    CURRENT MEDICATIONS:   Current Outpatient Medications:   •  atomoxetine, 18 mg, Oral, QAM  •  traZODone, 50 mg, Oral, Nightly  •  Praluent, 150 mg, Subcutaneous, Q14 DAYS  •  ondansetron, DISSOLVE 1 TABLET IN MOUTH EVERY 12 HOURS AS NEEDED FOR NAUSEA FOR VOMITING  •  omeprazole, 40 mg, Oral, DAILY  •  chlorthalidone, 25 mg, Oral, DAILY  •  lamotrigine, 150 mg, Oral, DAILY  •  albuterol, 2 Puff, Inhalation, Q6HRS PRN    ALLERGIES: Dust mite extract    FAMILY HISTORY:   Grandfather- 48 MI, and 52  of MI   Dad -40's, MI    SOCIAL HISTORY   Social History     Tobacco Use   Smoking Status Former Smoker   • Years: 25.00   • Types: Cigarettes   • Quit date: 3/4/2017   • Years since quittin.1   Smokeless Tobacco Never Used   Tobacco Comment    3-4 cigarrette per day     Change in weight: lost about 5lb in 2 months   Exercise habits: moderate regular exercise program  Diet: low carbohydrate, high protein, no fried foods, no red meat     63% lean protein    Objective      Vitals:    22 1324   BP: 118/84   Pulse: 87   Weight: 71.2 kg (157 lb)   Height: 1.588 m (5' 2.5\")      Physical Exam    DATA REVIEW:  Most Recent Lipid Panel:   Lab Results   Component Value Date    CHOLSTRLTOT 186 2022    TRIGLYCERIDE 243 (H) 2022    HDL 40 2022    LDL 97 2022       Other Pertinent Blood Work:   Lab Results   Component Value Date    SODIUM 137 2022    POTASSIUM 3.6 2022    CHLORIDE 100 2022    CO2 24 2022    ANION 13.0 2022    GLUCOSE 107 (H) 2022    BUN 21 2022    CREATININE 0.87 2022    CALCIUM 9.8 2022    ASTSGOT 18 2022    ALTSGPT 14 2022    ALKPHOSPHAT 55 2022    TBILIRUBIN 0.5 2022    ALBUMIN " 4.6 01/12/2022    AGRATIO 1.5 01/12/2022    LIPOPROTA <6 04/19/2022    TSHULTRASEN 2.270 05/21/2021       Other:     Recent Imaging Studies:    Coronary calcification:  LMA - 0.0  LCX - 9.3  LAD - 0.0  RCA - 0.0  PDA - 0.0     Total Calcium Score: 9.3     Percentile: Calcium score is above the 75th percentile for the patient's age and sex.      ASSESSMENT AND PLAN  Patient Type, check all that apply:   likely familial hypercholesterolemia.    Recently had to stop Lipitor due to intolerance.    History of a gastric sleeve.   Established Atherosclerotic Cardiovascular Disease (ASCVD)  Some cardiovascular disease due to a coronary artery calcium score of 9.3, but below the threshold of 300.    This likely underestimates the degree of plaque due to her age.  Other Established (non-atherosclerotic) Vascular Disease, if Present:  none  Evidence of Heterozygous Familial Hypercholesterolemia (FH):   · Possible LDL levels above 190.   · Total cholesterol 352, HDLs 38, non-HDLs = 314. Therefore, LDLs likely >190, but unfortunately no direct LDL was measured at this time.  · Family history of premature coronary artery disease and death in multiple relatives  ACC/AHA Indication for Statin Therapy, eryn all that apply:  LDL-C at baseline >190 mg/dl: Indication for High intensity statin , But appears intolerant after using Crestor and Lipitor  Starting to see plaque in the LCX  Intolerant to Zetia and Lopid  Calculated Risk for ASCVD, if applicable    N/A, lifetime risk greater than 50%  Other Significant Risk Markers, if any, eryn all that apply   elevated coronary calcium score and Family history of premature ASCVD in first degree relative  Goal LDL-C and nonHDL-C based on Clinic Protocol  LDL-C:   <100 mg/dL,  Lifestyle Recommendations From Today’s Visit:    Continue to focus on weight loss and exercise  Statin Therapy Recommendations from Today’s Visit:   Unable to tolerate statins  Non-Statin Medications Recommendations from  Today’s Visit:   Unable to tolerate Zetia   Unable to tolerate Lopid   Declined fenofibrate  Indication for PCSK9 Inhibitor, if applicable:  FH with suboptimal control of LDL-C despite maximally tolerated statin and zetia    Continue Praluent 150 mg every 14 days  Supplements Recommended at this visit:   Continue vitamin D  Recommendations for Other Cardiovascular Risk Factors, eryn all that apply:   Continue to focus on weight loss  Other Issues:    Studies Ordered at Todays Visit:  Blood Work Ordered At Today’s visit: Lipid panel,   Follow-Up: 6 months     Fermín Puentes, PharmD    CC:  Cheruba Abraham, M.D. Dr. Bloch Janeen Abe Ecola Jackson Nathan D. Bozeat, PharmD

## 2022-05-19 ENCOUNTER — TELEPHONE (OUTPATIENT)
Dept: URGENT CARE | Facility: PHYSICIAN GROUP | Age: 52
End: 2022-05-19

## 2022-05-19 DIAGNOSIS — J45.21 MILD INTERMITTENT ASTHMA WITH EXACERBATION: ICD-10-CM

## 2022-05-19 RX ORDER — OMEPRAZOLE 40 MG/1
40 CAPSULE, DELAYED RELEASE ORAL DAILY
Qty: 90 CAPSULE | Refills: 3 | Status: SHIPPED | OUTPATIENT
Start: 2022-05-19 | End: 2022-06-03 | Stop reason: SDUPTHER

## 2022-05-19 RX ORDER — LAMOTRIGINE 150 MG/1
150 TABLET ORAL DAILY
Qty: 90 TABLET | Refills: 0 | Status: SHIPPED | OUTPATIENT
Start: 2022-05-19 | End: 2022-06-03 | Stop reason: SDUPTHER

## 2022-05-19 RX ORDER — TRAZODONE HYDROCHLORIDE 50 MG/1
50 TABLET ORAL NIGHTLY
Qty: 90 TABLET | Refills: 3 | Status: SHIPPED | OUTPATIENT
Start: 2022-05-19 | End: 2022-06-03 | Stop reason: SDUPTHER

## 2022-05-19 RX ORDER — ALBUTEROL SULFATE 90 UG/1
2 AEROSOL, METERED RESPIRATORY (INHALATION) EVERY 6 HOURS PRN
Qty: 8.5 G | Refills: 2 | Status: SHIPPED | OUTPATIENT
Start: 2022-05-19 | End: 2022-06-03 | Stop reason: SDUPTHER

## 2022-05-19 RX ORDER — CHLORTHALIDONE 25 MG/1
25 TABLET ORAL DAILY
Qty: 90 TABLET | Refills: 3 | Status: SHIPPED | OUTPATIENT
Start: 2022-05-19 | End: 2022-06-03 | Stop reason: SDUPTHER

## 2022-06-02 NOTE — PROGRESS NOTES
06/13/22    Subjective    Chief Complaint:  Follow up polycythemia ? vera    HPI:  51 female with polycythemia and a low level positive NINA 2 mutation but a normal erythropoietin level. She does have chronic lung issues. She is s/p bariatric surgery with a gastric sleeve about a year ago. Feels well. No complaints.     ROS:    Constitutional: No weight loss  Skin: No rash or jaundice  HENT: No change in eyesight or hearing  Cardiovascular:No chest pain or arrythmia  Respiratory:No cough or SOB  GI:No nausea, vomiting, diarrhea, constipation  :No dysuria or frequency  Musculoskeletal:No bone or joint pain  Neuro:No sx's of neuropathy  Psych: No complaints    PMH:      Allergies   Allergen Reactions   • Dust Mite Extract Unspecified     Other reaction(s): Unknown   • Penicillins Hives and Unspecified     rash       Past Medical History:   Diagnosis Date   • Asthma    • Bipolar 1 disorder, depressed (HCC)    • Heart burn    • High cholesterol 04/08/2021   • Hypertension 04/08/2021   • Hypothyroidism         Past Surgical History:   Procedure Laterality Date   • MT LAP, CORINNE RESTRICT PROC, LONGITUDINAL GAS*  4/16/2021    Procedure: GASTRECTOMY, SLEEVE, LAPAROSCOPIC;  Surgeon: Houston Pickett M.D.;  Location: SURGERY [facility];  Service: General   • MT UNLISTED LAPAROSCOPIC PROC, LIVER  4/16/2021    Procedure: BIOPSY, LIVER, LAPAROSCOPIC.;  Surgeon: Houston Pickett M.D.;  Location: SURGERY [facility];  Service: General   • HYSTERECTOMY, TOTAL ABDOMINAL          Medications:    Current Outpatient Medications on File Prior to Visit   Medication Sig Dispense Refill   • traZODone (DESYREL) 50 MG Tab Take 1 Tablet by mouth every evening. 90 Tablet 3   • omeprazole (PRILOSEC) 40 MG delayed-release capsule Take 1 Capsule by mouth every day. 90 Capsule 3   • chlorthalidone (HYGROTON) 25 MG Tab Take 1 Tablet by mouth every day. 90 Tablet 3   • albuterol 108 (90 Base) MCG/ACT Aero Soln inhalation aerosol Inhale 2 Puffs every 6  "hours as needed for Shortness of Breath. 8.5 g 5   • Alirocumab (PRALUENT) 150 MG/ML Solution Auto-injector Inject 150 mg under the skin every 14 days. 6 mL 3   • lamotrigine (LAMICTAL) 150 MG tablet Take 1 Tablet by mouth every day. 90 Tablet 3   • ondansetron (ZOFRAN ODT) 8 MG TABLET DISPERSIBLE DISSOLVE 1 TABLET IN MOUTH EVERY 12 HOURS AS NEEDED FOR NAUSEA FOR VOMITING 15 Tablet 3   • atomoxetine (STRATTERA) 18 MG capsule Take 1 Capsule by mouth every morning. 90 Capsule 3     No current facility-administered medications on file prior to visit.       Social History     Tobacco Use   • Smoking status: Former Smoker     Years: 25.00     Types: Cigarettes     Quit date: 3/4/2017     Years since quittin.2   • Smokeless tobacco: Never Used   • Tobacco comment: 3-4 cigarrette per day   Substance Use Topics   • Alcohol use: No        History reviewed. No pertinent family history.     Objective    Vitals:    BP (!) 118/90   Pulse 79   Temp 36.7 °C (98 °F) (Temporal)   Resp 18   Ht 1.588 m (5' 2.5\")   Wt 73 kg (160 lb 15 oz)   SpO2 95%   BMI 28.97 kg/m²     Physical Exam:    Appears well-developed and well-nourished. No distress.    Head -  Normocephalic .   Eyes - Pupils are equal, round, and reactive to light. Conjunctivae and EOM are normal. No scleral icterus.   Ears - normal hearing  Neurological -   Alert and oriented to person, place, and time. No focal findings  Skin - Skin is warm and dry. No rash noted. Not diaphoretic. No erythema. No pallor. No jaundice   Psychiatric -  Normal mood and affect.    Labs:     Latest Reference Range & Units 22 07:26 22 06:31 22 07:43   WBC 4.8 - 10.8 K/uL 8.7 9.1 7.7   RBC 4.20 - 5.40 M/uL 5.17 4.96 5.17   Hemoglobin 12.0 - 16.0 g/dL 17.2 (H) 16.4 (H) 17.3 (H)   Hematocrit 37.0 - 47.0 % 49.3 (H) 47.3 (H) 49.2 (H)   MCV 81.4 - 97.8 fL 95.4 95.4 95.2   MCH 27.0 - 33.0 pg 33.3 (H) 33.1 (H) 33.5 (H)   MCHC 33.6 - 35.0 g/dL 34.9 34.7 35.2 (H)   RDW 35.9 - " 50.0 fL 42.9 43.1 43.1   Platelet Count 164 - 446 K/uL 336 345 337      Latest Reference Range & Units 06/04/22 07:43   Iron 40 - 170 ug/dL 114   Total Iron Binding 250 - 450 ug/dL 241 (L)   % Saturation 15 - 55 % 47     Assessment    Imp:    Visit Diagnosis:    1. Polycythemia vera (HCC)  CBC WITH DIFFERENTIAL     Plan:  6 months with lab prior    Bhargav Rosado M.D.

## 2022-06-03 ENCOUNTER — TELEPHONE (OUTPATIENT)
Dept: VASCULAR LAB | Facility: MEDICAL CENTER | Age: 52
End: 2022-06-03
Payer: COMMERCIAL

## 2022-06-03 ENCOUNTER — DOCUMENTATION (OUTPATIENT)
Dept: VASCULAR LAB | Facility: MEDICAL CENTER | Age: 52
End: 2022-06-03
Payer: COMMERCIAL

## 2022-06-03 DIAGNOSIS — E78.2 MIXED HYPERLIPIDEMIA: ICD-10-CM

## 2022-06-03 DIAGNOSIS — J45.21 MILD INTERMITTENT ASTHMA WITH EXACERBATION: ICD-10-CM

## 2022-06-03 RX ORDER — TRAZODONE HYDROCHLORIDE 50 MG/1
50 TABLET ORAL NIGHTLY
Qty: 90 TABLET | Refills: 3 | Status: SHIPPED | OUTPATIENT
Start: 2022-06-03 | End: 2023-08-01 | Stop reason: SDUPTHER

## 2022-06-03 RX ORDER — OMEPRAZOLE 40 MG/1
40 CAPSULE, DELAYED RELEASE ORAL DAILY
Qty: 90 CAPSULE | Refills: 3 | Status: SHIPPED | OUTPATIENT
Start: 2022-06-03 | End: 2023-08-01 | Stop reason: SDUPTHER

## 2022-06-03 RX ORDER — CHLORTHALIDONE 25 MG/1
25 TABLET ORAL DAILY
Qty: 90 TABLET | Refills: 3 | Status: SHIPPED | OUTPATIENT
Start: 2022-06-03 | End: 2023-08-01 | Stop reason: SDUPTHER

## 2022-06-03 RX ORDER — LAMOTRIGINE 150 MG/1
150 TABLET ORAL DAILY
Qty: 90 TABLET | Refills: 3 | Status: SHIPPED | OUTPATIENT
Start: 2022-06-03 | End: 2023-06-29 | Stop reason: SDUPTHER

## 2022-06-03 RX ORDER — ALIROCUMAB 150 MG/ML
150 INJECTION, SOLUTION SUBCUTANEOUS
Qty: 6 ML | Refills: 3 | Status: SHIPPED | OUTPATIENT
Start: 2022-06-03 | End: 2023-05-08 | Stop reason: SDUPTHER

## 2022-06-03 RX ORDER — ATOMOXETINE 18 MG/1
18 CAPSULE ORAL EVERY MORNING
Qty: 90 CAPSULE | Refills: 3 | Status: SHIPPED | OUTPATIENT
Start: 2022-06-03 | End: 2023-05-12 | Stop reason: SDUPTHER

## 2022-06-03 RX ORDER — ONDANSETRON 8 MG/1
TABLET, ORALLY DISINTEGRATING ORAL
Qty: 15 TABLET | Refills: 3 | Status: SHIPPED | OUTPATIENT
Start: 2022-06-03 | End: 2023-02-09 | Stop reason: SDUPTHER

## 2022-06-03 RX ORDER — ALBUTEROL SULFATE 90 UG/1
2 AEROSOL, METERED RESPIRATORY (INHALATION) EVERY 6 HOURS PRN
Qty: 8.5 G | Refills: 5 | Status: SHIPPED | OUTPATIENT
Start: 2022-06-03

## 2022-06-03 NOTE — TELEPHONE ENCOUNTER
I have spoken to the patient. She is doing well on Praluent. She does not have any questions or concerns at this time for the pharmacist. She has requested delivery for 6/7/2022.

## 2022-06-03 NOTE — PROGRESS NOTES
Gela Grissom,    The patient has requested for refill for the following medications.    Trazodone Tablet  Omeprazole Capsule  Chlorthalidone Tablet  Albuterol Inhaler  Strattera  Lamictal Tablet  Ondansetron Tablet    Can you please provide new prescriptions to [facility]?      Pharmacy Coordinator  Lupe Lockwood  [phone]

## 2022-06-04 ENCOUNTER — HOSPITAL ENCOUNTER (OUTPATIENT)
Dept: LAB | Facility: MEDICAL CENTER | Age: 52
End: 2022-06-04
Attending: INTERNAL MEDICINE
Payer: COMMERCIAL

## 2022-06-04 DIAGNOSIS — D45 POLYCYTHEMIA VERA (HCC): ICD-10-CM

## 2022-06-04 LAB
BASOPHILS # BLD AUTO: 0.8 % (ref 0–1.8)
BASOPHILS # BLD: 0.06 K/UL (ref 0–0.12)
EOSINOPHIL # BLD AUTO: 0.27 K/UL (ref 0–0.51)
EOSINOPHIL NFR BLD: 3.5 % (ref 0–6.9)
ERYTHROCYTE [DISTWIDTH] IN BLOOD BY AUTOMATED COUNT: 43.1 FL (ref 35.9–50)
HCT VFR BLD AUTO: 49.2 % (ref 37–47)
HGB BLD-MCNC: 17.3 G/DL (ref 12–16)
IMM GRANULOCYTES # BLD AUTO: 0.01 K/UL (ref 0–0.11)
IMM GRANULOCYTES NFR BLD AUTO: 0.1 % (ref 0–0.9)
IRON SATN MFR SERPL: 47 % (ref 15–55)
IRON SERPL-MCNC: 114 UG/DL (ref 40–170)
LYMPHOCYTES # BLD AUTO: 2.88 K/UL (ref 1–4.8)
LYMPHOCYTES NFR BLD: 37.5 % (ref 22–41)
MCH RBC QN AUTO: 33.5 PG (ref 27–33)
MCHC RBC AUTO-ENTMCNC: 35.2 G/DL (ref 33.6–35)
MCV RBC AUTO: 95.2 FL (ref 81.4–97.8)
MONOCYTES # BLD AUTO: 0.58 K/UL (ref 0–0.85)
MONOCYTES NFR BLD AUTO: 7.6 % (ref 0–13.4)
NEUTROPHILS # BLD AUTO: 3.88 K/UL (ref 2–7.15)
NEUTROPHILS NFR BLD: 50.5 % (ref 44–72)
NRBC # BLD AUTO: 0 K/UL
NRBC BLD-RTO: 0 /100 WBC
PLATELET # BLD AUTO: 337 K/UL (ref 164–446)
PMV BLD AUTO: 9.8 FL (ref 9–12.9)
RBC # BLD AUTO: 5.17 M/UL (ref 4.2–5.4)
TIBC SERPL-MCNC: 241 UG/DL (ref 250–450)
UIBC SERPL-MCNC: 127 UG/DL (ref 110–370)
VIT B12 SERPL-MCNC: 737 PG/ML (ref 211–911)
WBC # BLD AUTO: 7.7 K/UL (ref 4.8–10.8)

## 2022-06-04 PROCEDURE — 85025 COMPLETE CBC W/AUTO DIFF WBC: CPT

## 2022-06-04 PROCEDURE — 83550 IRON BINDING TEST: CPT

## 2022-06-04 PROCEDURE — RXMED WILLOW AMBULATORY MEDICATION CHARGE: Performed by: FAMILY MEDICINE

## 2022-06-04 PROCEDURE — 36415 COLL VENOUS BLD VENIPUNCTURE: CPT

## 2022-06-04 PROCEDURE — 83540 ASSAY OF IRON: CPT

## 2022-06-04 PROCEDURE — 82607 VITAMIN B-12: CPT

## 2022-06-07 ENCOUNTER — PHARMACY VISIT (OUTPATIENT)
Dept: PHARMACY | Facility: MEDICAL CENTER | Age: 52
End: 2022-06-07
Payer: MEDICARE

## 2022-06-07 ENCOUNTER — PATIENT MESSAGE (OUTPATIENT)
Dept: HEALTH INFORMATION MANAGEMENT | Facility: OTHER | Age: 52
End: 2022-06-07

## 2022-06-07 PROCEDURE — RXMED WILLOW AMBULATORY MEDICATION CHARGE: Performed by: FAMILY MEDICINE

## 2022-06-13 ENCOUNTER — OFFICE VISIT (OUTPATIENT)
Dept: HEMATOLOGY ONCOLOGY | Facility: MEDICAL CENTER | Age: 52
End: 2022-06-13
Payer: COMMERCIAL

## 2022-06-13 VITALS
TEMPERATURE: 98 F | OXYGEN SATURATION: 95 % | DIASTOLIC BLOOD PRESSURE: 90 MMHG | BODY MASS INDEX: 28.52 KG/M2 | RESPIRATION RATE: 18 BRPM | SYSTOLIC BLOOD PRESSURE: 118 MMHG | HEIGHT: 63 IN | WEIGHT: 160.94 LBS | HEART RATE: 79 BPM

## 2022-06-13 DIAGNOSIS — D45 POLYCYTHEMIA VERA (HCC): ICD-10-CM

## 2022-06-13 PROCEDURE — 99213 OFFICE O/P EST LOW 20 MIN: CPT | Performed by: INTERNAL MEDICINE

## 2022-06-13 ASSESSMENT — FIBROSIS 4 INDEX: FIB4 SCORE: 0.7

## 2022-06-13 ASSESSMENT — PAIN SCALES - GENERAL: PAINLEVEL: NO PAIN

## 2022-07-18 ASSESSMENT — ENCOUNTER SYMPTOMS: SLEEP DISTURBANCE: 1

## 2022-07-21 ENCOUNTER — OFFICE VISIT (OUTPATIENT)
Dept: SLEEP MEDICINE | Facility: MEDICAL CENTER | Age: 52
End: 2022-07-21
Payer: COMMERCIAL

## 2022-07-21 VITALS
SYSTOLIC BLOOD PRESSURE: 106 MMHG | OXYGEN SATURATION: 96 % | HEART RATE: 70 BPM | WEIGHT: 160 LBS | BODY MASS INDEX: 28.35 KG/M2 | RESPIRATION RATE: 16 BRPM | HEIGHT: 63 IN | DIASTOLIC BLOOD PRESSURE: 76 MMHG

## 2022-07-21 DIAGNOSIS — R06.83 SNORING: ICD-10-CM

## 2022-07-21 DIAGNOSIS — G47.30 BREATHING-RELATED SLEEP DISORDER: ICD-10-CM

## 2022-07-21 DIAGNOSIS — R06.81 WITNESSED EPISODE OF APNEA: ICD-10-CM

## 2022-07-21 DIAGNOSIS — G47.19 EXCESSIVE DAYTIME SLEEPINESS: ICD-10-CM

## 2022-07-21 PROCEDURE — 99204 OFFICE O/P NEW MOD 45 MIN: CPT | Performed by: PREVENTIVE MEDICINE

## 2022-07-21 ASSESSMENT — FIBROSIS 4 INDEX: FIB4 SCORE: 0.7

## 2022-07-21 NOTE — PROGRESS NOTES
"CHIEF COMPLIANT: \"I guess I need a sleep study.\"  HISTORY OF PRESENT ILLNESS:  Margareth Blake is a 51 y.o. female kindly referred by Eunice Grissom M.D..  She is here for a sleep medicine consultation.Past medical history includes asthma, bipolar affective disorder, essential hypertension, GERD, anxiety, dyslipidemia, status post bariatric surgery, mild obesity, history of elevated hemoglobin, history of polycythemia vera, and history of fatty liver.    Sleep History: This patient was referred by her cardiologist.  She currently works as an .  In the past she has used sleep aids that include Ambien, tramadol and Seroquel.  She reports multiple environmental allergies including molds, dust, pet dander if and other environmental allergens.  She goes to bed at 9 PM and wakes up at 5 AM she takes 30 minutes to fall asleep she wakes up at least 6 times at night and 3 times she uses the bathroom.  She will nap given the opportunity especially if she is home.  She is always exhausted.  She has been told that she snores especially when she drinks alcohol.  She does move her legs and feet a lot when she is in bed.  And she has been told that she grinds her teeth.      Symptom Summary:  Snoring: +  Very loud snoring: -  Witnessed apneas: +  Resuscitative snorts: +  Nocturnal shortness of breath: +  Non-restorative sleep: +  EPWORTH SCORE:   14  /24, this is consistent with EDS  Insomnia: +  Nocturnal awakenings: +  Nocturia: +  EDS: +  Fatigue: +  Falls asleep accidentally: +   Napping or returning to bed after arising:-- when she can  Restless legs: -  Limb movements during sleep: -  Nocturnal headaches: -  Morning Headaches: -    Significant comorbidities and modifying factors: see HPI  PROBLEM LIST:  Patient Active Problem List    Diagnosis Date Noted   • Liver hemangioma 04/28/2022   • Polycythemia vera (HCC) 03/11/2022   • Elevated hemoglobin (HCC) 01/28/2022   • Prediabetes 01/28/2022   • Overweight " 01/28/2022   • Fatty liver 01/28/2022   • Constipation 01/28/2022   • S/P bariatric surgery 04/28/2021   • Obesity (BMI 30-39.9) 04/28/2021   • Hypertriglyceridemia 04/10/2019   • Hallux valgus of left foot 04/10/2019   • Skin lesion 04/10/2019   • Anxiety 02/19/2019   • Gastroesophageal reflux disease 02/19/2019   • Uncomplicated asthma 10/11/2017   • Bipolar affective disorder, currently depressed, mild (HCC) 10/11/2017   • Essential hypertension 10/11/2017     PAST MEDICAL HISTORY:  Past Medical History:   Diagnosis Date   • Asthma    • Bipolar 1 disorder, depressed (HCC)    • Chickenpox    • Constipation    • Coronary heart disease    • Daytime sleepiness    • Frequent headaches    • Frequent urination    • GERD (gastroesophageal reflux disease)    • Wolof measles    • Heart burn    • Heartburn    • High cholesterol 04/08/2021   • Hypertension 04/08/2021   • Hypothyroidism    • Insomnia    • Morning headache    • Painful joint    • Tonsillitis    • Toothache    • Weakness    • Weight loss    • Whooping cough       PAST SOCIAL HISTORY:  Past Surgical History:   Procedure Laterality Date   • [place] LAP, CORINNE RESTRICT PROC, LONGITUDINAL GAS*  04/16/2021    Procedure: GASTRECTOMY, SLEEVE, LAPAROSCOPIC;  Surgeon: Houston Pickett M.D.;  Location: SURGERY [facility];  Service: General   • [place] UNLISTED LAPAROSCOPIC PROC, LIVER  04/16/2021    Procedure: BIOPSY, LIVER, LAPAROSCOPIC.;  Surgeon: Houston Pickett M.D.;  Location: SURGERY [facility];  Service: General   • HYSTERECTOMY LAPAROSCOPY     • HYSTERECTOMY, TOTAL ABDOMINAL     • SLEEVE,SOPHIE VASO THIGH       PAST FAMILY HISTORY:  History reviewed. No pertinent family history.  SOCIAL HISTORY:  Social History     Socioeconomic History   • Marital status: Single     Spouse name: Not on file   • Number of children: Not on file   • Years of education: Not on file   • Highest education level: Not on file   Occupational History   • Not on file   Tobacco Use   • Smoking status:  "Former Smoker     Packs/day: 0.00     Years: 25.00     Pack years: 0.00     Types: Cigarettes     Start date: 1990     Quit date: 2015     Years since quittin.5   • Smokeless tobacco: Never Used   • Tobacco comment: No tobacco 6 years   Vaping Use   • Vaping Use: Never used   Substance and Sexual Activity   • Alcohol use: No   • Drug use: Yes     Types: Marijuana     Comment: sometimes eat edibles for sleep   • Sexual activity: Never   Other Topics Concern   • Not on file   Social History Narrative   • Not on file     Social Determinants of Health     Financial Resource Strain: Not on file   Food Insecurity: Not on file   Transportation Needs: Not on file   Physical Activity: Not on file   Stress: Not on file   Social Connections: Not on file   Intimate Partner Violence: Not on file   Housing Stability: Not on file     ALLERGIES: Dust mite extract and Penicillins  MEDICATIONS:  Current Outpatient Medications   Medication Sig Dispense Refill   • traZODone (DESYREL) 50 MG Tab Take 1 Tablet by mouth every evening. 90 Tablet 3   • omeprazole (PRILOSEC) 40 MG delayed-release capsule Take 1 Capsule by mouth every day. 90 Capsule 3   • chlorthalidone (HYGROTON) 25 MG Tab Take 1 Tablet by mouth every day. 90 Tablet 3   • albuterol 108 (90 Base) MCG/ACT Aero Soln inhalation aerosol Inhale 2 Puffs every 6 hours as needed for Shortness of Breath. 8.5 g 5   • Alirocumab (PRALUENT) 150 MG/ML Solution Auto-injector Inject 150 mg under the skin every 14 days. 6 mL 3   • lamotrigine (LAMICTAL) 150 MG tablet Take 1 Tablet by mouth every day. 90 Tablet 3   • ondansetron (ZOFRAN ODT) 8 MG TABLET DISPERSIBLE DISSOLVE 1 TABLET IN MOUTH EVERY 12 HOURS AS NEEDED FOR NAUSEA FOR VOMITING 15 Tablet 3   • atomoxetine (STRATTERA) 18 MG capsule Take 1 Capsule by mouth every morning. 90 Capsule 3     No current facility-administered medications for this visit.    \"CURRENT RX\"    REVIEW OF SYSTEMS:  Constitutional: Denies weight loss, " "endorses severe chronic daytime fatigue++  Eyes: Denies vision changes  Ears/Nose/Mouth/Throat: Denies rhinitis/nasal congestion, injury, decayed teeth/toothaches.  Cardiovascular: Denies chest pain, tightness, palpitations, swelling in legs/feet, difficulty breathing when lying down but gets better when sitting up.   Respiratory: Denies shortness of breath while awake,  Sleep: per HPI  Gastrointestinal: Denies  difficulty swallowing,  heartburn.  Genitourinary: REPORTS nocturia  Musculoskeletal: Denies painful joints, sore muscles, back pain.   Neurological: Denies frequent headaches,weakness, dizziness.    PHYSICAL EXAM/VITALS:  /76 (BP Location: Left arm, Patient Position: Sitting, BP Cuff Size: Adult)   Pulse 70   Resp 16   Ht 1.588 m (5' 2.5\")   Wt 72.6 kg (160 lb)   SpO2 96%   BMI 28.80 kg/m²   Neck circumference (inches): 15  Appearance: Well-nourished, well-developed,  looks stated age, no acute distress  Eyes:   EOMI  ENMT: MASKED Mallampati: 2-3    Neck: Supple, trachea midline  Respiratory effort:  No intercostal retractions or use of accessory muscles  Lung auscultation:  No wheezes rhonchi rubs or rales  Cardiac: No murmurs, rubs, or gallops; regular rhythm, normal rate; no edema  Musculoskeletal:  Grossly normal; gait and station normal  Neurologic:  oriented to person, time, place, and purpose; judgement intact  Psychiatric:  No depression, anxiety, agitation    MEDICAL DECISION MAKING:  • The medical record was reviewed in its as pertains to this referral. This includes records from primary care, consultants notes,  referral request, hospital records, labs, imaging. Any available diagnostic and titration nocturnal polysomnograms, home sleep apnea tests, continuous nocturnal oximetry results, multiple sleep latency tests, and compliance reports were reviewed with the patient.    ASSESSMENT/PLAN: This patient has a high pretest probability of having sleep apnea.  She will undergo a home " sleep apnea test.    1. Breathing-related sleep disorder  - Overnight Home Sleep Study; Future    2. Snoring  - Overnight Home Sleep Study; Future    3. Witnessed episode of apnea  - Overnight Home Sleep Study; Future    4. Excessive daytime sleepiness  - Overnight Home Sleep Study; Future    • The patient has signs and symptoms consistent with obstructive sleep apnea hypopnea syndrome. Will schedule  a home sleep apnea test (please see plan).  • The risks of untreated sleep apnea were discussed with the patient at length. Patients with BEATRICE are at increased risk of cardiovascular disease including coronary artery disease, systemic arterial hypertension, pulmonary arterial hypertension, cardiac arrhythmias, and stroke. BEATRICE patients have an increased risk of motor vehicle accidents, type 2 diabetes, chronic kidney disease, and non-alcoholic liver disease. The patient was advised to avoid driving a motor vehicle when drowsy.  • Have advised the patient to follow up with the appropriate healthcare practitioners for all other medical problems and issues.    RETURN TO CLINIC: Return for 2 wks after to discuss sleep study with Dr. Prather.  My total time spent caring for the patient on the day of the encounter was 45minutes. This includes time time spent on a thorough chart review including other physician notes, any type of sleep study, as well as critical labs and pulmonary and cardiac studies.  Additionally it includes discussions of good sleep hygiene, stimulus control and going over the need for consistency in terms of sleep preparation and practice.     Please note that this dictation was created using voice recognition software.  I have made every reasonable attempt to correct obvious errors, I expect that there are errors of grammar and possibly content that I did not discover before finalizing this note.                      Answers for HPI/ROS submitted by the patient on 7/18/2022  Year of your last physical exam:  4/11/2022  Results of exam: high blood pressure some heart dieses high cholesterol asthma polycythemia bipolar  Occupation :   Height: 5*2  Current weight: 159  6 months ago: 158  At age 22: 51  What is the reason for your visit today?: referred by cardiologist  Name of person referring you to the [facility]: [facility]  Have you ever been hospitalized?: Yes  Reason, year, and hospital in which you were hospitalized:: 2021 Gastric Sleeve, 2012 gall bladder, 1995 Hysterectomy, 1994 baby, 1992 baby  Have you ever had problems with anesthesia?: No  Have you experienced post-operative delirium?: No  Any complications with surgery?: No  What year did you receive your last Flu shot?: 2022  What year did you receive you last Pneumonia shot?: 2022  Have you had a TB skin test? If so, please list the year and result:: 1995  Have you had Allergy skin testing? If so, please list the year and result:: 1977 [age] allergic to all molds, dust, pet dandruff and a million other things  Please briefly describe your sleep problem and how old you were when it began.: I sleep 1.5 hours at a time most of my adult life  How does this affect your daily life and activities? Please also rate how serious of a problem this is (1 = Not at all, 10 = Very Serious).: constant exhaustion  Have you had any previous evaluations, examinations, or treatment for this sleep problem or any other problems with your sleep? If so, please describe the evaluation, treatment, and results.: Yes just prescribed various medications  Have you used any medications (prescribed or otherwise) to help your sleep problem? If yes, include name, amount, frequency, and the prescribing physician.: most recent meds and in past ambien, tramodal and seriquil ect  If employed, what time do you usually start and end work?: start 7:30 am  Do you ever change work shifts? If yes, describe how often (never, infrequently, regularly).: not lately  What time do you  usually go to bed and wake up on: Weekdays? Weekends?: bed 9 pm up at 5 am weekdays.  Weekends bed by 9 up at 530 am  Do you have a regular bed partner?: No  How many minutes does it usually take to fall asleep at night after turning off the lights?: 30 minutes  What do you ordinarily do just prior to turning out the lights and attempting to go to sleep (e.g., reading, TV, baths, etc.)?: read  On average, how many times do you wake up during the night?: 6  On average, how many times do you wake up to use the bathroom?: 3  Do you often wake up too early in the morning and are unable to return to sleep?: yes  On average, how many hours of sleep do you get per night?: maybe 5  How do you usually awaken?  Alarm, spontaneously, or other?: Spontaneously mostly  Is it difficult for you to awaken and get out of bed after sleeping? (Not at all, Sometimes, Very): No  Do you nap or return to bed after arising?: If I am home I will nap  Are you bothered by sleepiness during the day?: Yes exhausted constantly  Do you feel that you get too much sleep at night?: Never  Do you feel that you get too little sleep at night?: Always  Do you usually feel tired during the day? If so, what do you attribute this to?: Im tired from the time I wake up till I go to bed not sure why maybe my Polycythemia and medications contribute to a lot of it  Do you find yourself falling asleep when you don't mean to? : Never  Have you ever suddenly fallen?: No  Have you ever experienced sudden body weakness?: Yes  If yes, were you aware of the things around you?: Yes  Was the weakness brought on by any particular event or feeling? If so, briefly describe.: When I walk stairs, and sometimes after taking meds or if Im late on meds.  And sometimes exhaustion causes it  Have you ever experienced weakness or paralysis upon going to sleep?: No  Have you ever experienced weakness or paralysis upon awakening from sleep?: Yes  If yes for either of the above two  "questions, please indicate how many times per week does this occur?: Monthly  Have you ever experienced seeing things or hearing voices/noises: That weren't real? On going to sleep? During the night? On awakening from sleep? During the day?: No  Do you have difficulty breathing at night? If yes, briefly describe.: No  How many times per week?: Zero  How did you become aware of this, at what age did this first occur, and how many years has this occurred?: N/A  Have you been told you snore while asleep? If so, does it disturb a bed partner (or someone in the same room), or someone in the next room?: Only if I drink or am very tired  Have you ever experienced doing something without being aware of the action? If yes, please describe.: No  How many times per week does this occur?: Zero  Have you ever experienced upon lying in bed before sleep or on awakening from sleep: Restlessness of legs, \"nervous legs\", \"creeping crawling\" sensation of legs, or twitching of legs?: Restless legs  How many times per week does this occur, and how many minutes does the sensation last?: I always move my legs or feet  Does anything relieve the sensations (e.g., getting out of bed, medication, massage)?: No  At what age did this first occur, and how many years has this occurred?: as long as I can remember  Have you ever been told that your arms or legs jerk or twitch while you are asleep? If yes, how many times per night does this occur?: yes weekly  At what age did this first occur, and how many years has this occurred?: most of my life  Does this seem to awaken you from your sleep?: Yes  Do you know, or have you ever been told that you do any of the following while sleeping: talk, walk, grit teeth, wet the bed, wake up screaming or seemingly afraid, have disturbing dreams, have unusual movements, wake up with headaches, (males) have erections? If yes to any of these, please indicate how many times per week, age started, last occurrence, " treatment received.: been told I grind teeth from dentist

## 2022-07-23 NOTE — TELEPHONE ENCOUNTER
3 month supply refilled. Please advise pt to do fasting labs and make appt for follow-up and additional fills. Will need to call in to pharmacy as it will not let me send electronically at this tme .   Viral illness

## 2022-08-12 ENCOUNTER — DOCUMENTATION (OUTPATIENT)
Dept: PHARMACY | Facility: MEDICAL CENTER | Age: 52
End: 2022-08-12
Payer: COMMERCIAL

## 2022-08-12 NOTE — PROGRESS NOTES
PHARMACIST FOLLOW UP - **Unable to reach - Chart Review and Refill Activity evaluation**  ICD10 verified: E78.2  List Diagnosis:  Mixed hyperlipidemia      I have evaluated data from the Refill Activity.  I have reviewed the patient's chart in EMR and confirmed medication remains effective and patient is not experiencing any concerning side effects. Patient remains stable and adherent to therapy.      Txt prescribed (med/dose/freq/cycle): Praluent 150mg/ml 1 pen every 14 days   Goals of Therapy: meeting/progressing towards goals    Achieve goal LDL levels (< 100 mg/dL) to reduce risk of cardiovascular events ( primary prevention): most recent LDL at goal    Implement lifestyle modifications: diet, exercise, weight loss, and smoking cessation. Per EMR notes continues to smoke 3 to 4 cigs/day, diet consists of low carbs, high protein (no fried foods or red meat). Lost 5lbs in 2 months  UTR patient, review patient specific goals of therapy and progression towards goals at next follow up assessment.    Unplanned medical visits (ER, Hospitalization):none  Date of last MD visit: 5/6/22  Summary of Last clinic visit plan (from MD): returns to office for FUV. LDL at goal, to continue focus on weight loss and exercise. Continue Praluent 150mg Q 14 days and RTC in 6 months.   Pertinent/Recent Labs flagged:    1. CBC: (6/4/22) Hgb 17.3*H Hct 49.2*H   2. CHEM 7: no recent    a. CRCL/GFR: no recent    b. LFTs/TBili: no recent   3. BP/HR: (7/21/22) wnl  Lipids: (4/19/22) Chol 186 LDL 97*L HDL 40 *H  Any new CVE since last call? None observed in EMR      Refill Activity Evaluation    Adherence per dispensing fill data: Y, only 2 fill on record in [place] disp. Refill cycles of 3 month supply on 3/24 and 6/3    Pertinent notes from Liaison Refill Activity: patient doing well     Any dose change/medication change/new allergy/new health condition reported? N  Next F/U [facility] review include: full CMR

## 2022-08-15 ENCOUNTER — OFFICE VISIT (OUTPATIENT)
Dept: CARDIOLOGY | Facility: MEDICAL CENTER | Age: 52
End: 2022-08-15
Payer: COMMERCIAL

## 2022-08-15 ENCOUNTER — PHARMACY VISIT (OUTPATIENT)
Dept: PHARMACY | Facility: MEDICAL CENTER | Age: 52
End: 2022-08-15
Payer: MEDICARE

## 2022-08-15 VITALS
DIASTOLIC BLOOD PRESSURE: 68 MMHG | OXYGEN SATURATION: 92 % | RESPIRATION RATE: 16 BRPM | SYSTOLIC BLOOD PRESSURE: 100 MMHG | WEIGHT: 164.4 LBS | HEIGHT: 62 IN | HEART RATE: 71 BPM | BODY MASS INDEX: 30.25 KG/M2

## 2022-08-15 DIAGNOSIS — E78.5 DYSLIPIDEMIA: ICD-10-CM

## 2022-08-15 DIAGNOSIS — E78.01 FAMILIAL HYPERCHOLESTEROLEMIA: ICD-10-CM

## 2022-08-15 DIAGNOSIS — I10 ESSENTIAL HYPERTENSION: ICD-10-CM

## 2022-08-15 DIAGNOSIS — Z82.49 FAMILY HISTORY OF EARLY CAD: ICD-10-CM

## 2022-08-15 PROCEDURE — 99214 OFFICE O/P EST MOD 30 MIN: CPT | Performed by: NURSE PRACTITIONER

## 2022-08-15 PROCEDURE — RXMED WILLOW AMBULATORY MEDICATION CHARGE: Performed by: INTERNAL MEDICINE

## 2022-08-15 RX ORDER — BNT162B2 0.23 MG/2.25ML
0.3 INJECTION, SUSPENSION INTRAMUSCULAR
Qty: 0.3 ML | Refills: 0 | Status: SHIPPED | OUTPATIENT
Start: 2022-08-15 | End: 2022-08-19 | Stop reason: SDUPTHER

## 2022-08-15 ASSESSMENT — ENCOUNTER SYMPTOMS
ABDOMINAL PAIN: 1
HEARTBURN: 1
PND: 0
SHORTNESS OF BREATH: 1
CLAUDICATION: 0
MYALGIAS: 0
FEVER: 0
COUGH: 0
DIZZINESS: 0
PALPITATIONS: 0
ORTHOPNEA: 0

## 2022-08-15 ASSESSMENT — FIBROSIS 4 INDEX: FIB4 SCORE: 0.7

## 2022-08-15 NOTE — PROGRESS NOTES
Chief Complaint   Patient presents with    Hyperlipidemia    Hypertension       Subjective     Margareth Blake is a 51 y.o. female who presents today for follow-up on her results.      Patient of Dr. Dorsey.  She was last seen in clinic on 3/4/2022 through telemedicine visit.  During that visit, patient was sent for CT cardiac score, started on ezetimibe and referred to the lipid clinic for management of her dyslipidemia.    Patient reports that she was not able to tolerate ezetimibe, developed either joint pain or vomiting.  She was seen by the lipid clinic and was started on Praluent.  She reports she has been tolerating the medication, and has also seen a reduction in her cholesterol levels.    She does have a history of premature CAD with her family.  Her father had a heart attack and has high cholesterol along with her sister and brother have high cholesterol.    Patient reports feeling fairly well except she continues to report having exhaustion.  She has been having exhaustion for quite some time.  She is pending a sleep study in the next month.    Patient also mentions having some abdominal cramping and symptoms of indigestion.  She also mentions having some shortness of breath with exertion.    She reports having gastric bypass surgery within the past 14 months and states that her symptoms could be due to her gastric bypass surgery.    Patient reports she does not exercise much, she does walk her dog daily, but states she honestly does not really exercise much.    She recently has been diagnosed with polycythemia vera, she is being followed by hematology.    She otherwise denies chest pain, palpitations, orthopnea, PND, edema or dizziness/lightheadedness.    Past Medical History:   Diagnosis Date    Asthma     Bipolar 1 disorder, depressed (HCC)     Chickenpox     Constipation     Coronary heart disease     Daytime sleepiness     Frequent headaches     Frequent urination     GERD (gastroesophageal  reflux disease)     Armenian measles     Heart burn     Heartburn     High cholesterol 2021    Hypertension 2021    Hypothyroidism     Insomnia     Morning headache     Painful joint     Tonsillitis     Toothache     Weakness     Weight loss     Whooping cough      Past Surgical History:   Procedure Laterality Date    OK LAP, CORINNE RESTRICT PROC, LONGITUDINAL GAS*  2021    Procedure: GASTRECTOMY, SLEEVE, LAPAROSCOPIC;  Surgeon: Houston Pickett M.D.;  Location: SURGERY [facility];  Service: General    OK UNLISTED LAPAROSCOPIC PROC, LIVER  2021    Procedure: BIOPSY, LIVER, LAPAROSCOPIC.;  Surgeon: Houston Pickett M.D.;  Location: SURGERY [facility];  Service: General    HYSTERECTOMY LAPAROSCOPY      HYSTERECTOMY, TOTAL ABDOMINAL      SLEEVE,SOPHIE VASO THIGH       History reviewed. No pertinent family history.  Social History     Socioeconomic History    Marital status: Single     Spouse name: Not on file    Number of children: Not on file    Years of education: Not on file    Highest education level: Not on file   Occupational History    Not on file   Tobacco Use    Smoking status: Former     Packs/day: 0.00     Years: 25.00     Pack years: 0.00     Types: Cigarettes     Start date: 1990     Quit date: 2015     Years since quittin.6    Smokeless tobacco: Never    Tobacco comments:     No tobacco 6 years   Vaping Use    Vaping Use: Never used   Substance and Sexual Activity    Alcohol use: No    Drug use: Yes     Types: Marijuana     Comment: sometimes eat edibles for sleep    Sexual activity: Never   Other Topics Concern    Not on file   Social History Narrative    Not on file     Social Determinants of Health     Financial Resource Strain: Not on file   Food Insecurity: Not on file   Transportation Needs: Not on file   Physical Activity: Not on file   Stress: Not on file   Social Connections: Not on file   Intimate Partner Violence: Not on file   Housing Stability: Not on file  "    Allergies   Allergen Reactions    Atorvastatin Myalgia    Dust Mite Extract Unspecified     Other reaction(s): Unknown     Outpatient Encounter Medications as of 8/15/2022   Medication Sig Dispense Refill    traZODone (DESYREL) 50 MG Tab Take 1 Tablet by mouth every evening. (Patient taking differently: Take 50 mg by mouth as needed.) 90 Tablet 3    omeprazole (PRILOSEC) 40 MG delayed-release capsule Take 1 Capsule by mouth every day. 90 Capsule 3    chlorthalidone (HYGROTON) 25 MG Tab Take 1 Tablet by mouth every day. 90 Tablet 3    albuterol 108 (90 Base) MCG/ACT Aero Soln inhalation aerosol Inhale 2 Puffs every 6 hours as needed for Shortness of Breath. 8.5 g 5    Alirocumab (PRALUENT) 150 MG/ML Solution Auto-injector Inject 150 mg under the skin every 14 days. 6 mL 3    lamotrigine (LAMICTAL) 150 MG tablet Take 1 Tablet by mouth every day. 90 Tablet 3    ondansetron (ZOFRAN ODT) 8 MG TABLET DISPERSIBLE DISSOLVE 1 TABLET IN MOUTH EVERY 12 HOURS AS NEEDED FOR NAUSEA FOR VOMITING 15 Tablet 3    [DISCONTINUED] atomoxetine (STRATTERA) 18 MG capsule Take 1 Capsule by mouth every morning. 90 Capsule 3     No facility-administered encounter medications on file as of 8/15/2022.     Review of Systems   Constitutional:  Positive for malaise/fatigue (Exhaustion). Negative for fever.   Respiratory:  Positive for shortness of breath (With stairs). Negative for cough.    Cardiovascular:  Negative for chest pain, palpitations, orthopnea, claudication, leg swelling and PND.   Gastrointestinal:  Positive for abdominal pain (Abdominal cramping) and heartburn.   Musculoskeletal:  Negative for myalgias.   Neurological:  Negative for dizziness.   All other systems reviewed and are negative.           Objective     /68 (BP Location: Left arm, Patient Position: Sitting, BP Cuff Size: Adult)   Pulse 71   Resp 16   Ht 1.575 m (5' 2\")   Wt 74.6 kg (164 lb 6.4 oz)   SpO2 92%   BMI 30.07 kg/m²     Physical Exam  Vitals " reviewed.   Constitutional:       Appearance: She is well-developed.   HENT:      Head: Normocephalic and atraumatic.   Eyes:      Pupils: Pupils are equal, round, and reactive to light.   Neck:      Vascular: No JVD.   Cardiovascular:      Rate and Rhythm: Normal rate and regular rhythm.      Heart sounds: Normal heart sounds.   Pulmonary:      Effort: Pulmonary effort is normal. No respiratory distress.      Breath sounds: Normal breath sounds. No wheezing or rales.   Abdominal:      General: Bowel sounds are normal.      Palpations: Abdomen is soft.   Musculoskeletal:         General: Normal range of motion.      Cervical back: Normal range of motion and neck supple.      Right lower leg: No edema.      Left lower leg: No edema.   Skin:     General: Skin is warm and dry.   Neurological:      General: No focal deficit present.      Mental Status: She is alert and oriented to person, place, and time.   Psychiatric:         Behavior: Behavior normal.     Lab Results   Component Value Date/Time    CHOLSTRLTOT 186 04/19/2022 06:30 AM    LDL 97 04/19/2022 06:30 AM    HDL 40 04/19/2022 06:30 AM    TRIGLYCERIDE 243 (H) 04/19/2022 06:30 AM       Lab Results   Component Value Date/Time    SODIUM 137 01/12/2022 06:28 AM    POTASSIUM 3.6 01/12/2022 06:28 AM    CHLORIDE 100 01/12/2022 06:28 AM    CO2 24 01/12/2022 06:28 AM    GLUCOSE 107 (H) 01/12/2022 06:28 AM    BUN 21 01/12/2022 06:28 AM    CREATININE 0.87 01/12/2022 06:28 AM     Lab Results   Component Value Date/Time    ALKPHOSPHAT 55 01/12/2022 06:28 AM    ASTSGOT 18 01/12/2022 06:28 AM    ALTSGPT 14 01/12/2022 06:28 AM    TBILIRUBIN 0.5 01/12/2022 06:28 AM      CT cardiac score 3/14/2022  FINDINGS:     Coronary calcification:  LMA - 0.0  LCX - 9.3  LAD - 0.0  RCA - 0.0  PDA - 0.0     Total Calcium Score: 9.3     Percentile: Calcium score is above the 75th percentile for the patient's age and sex.     Other findings:  Heart: Normal size.  Lungs: Clear.  Mediastinum:  Normal.  Upper abdomen: There are again seen hepatic masses which are previously been evaluated with CT scan and felt to represent hemangiomata..     IMPRESSION:     Calcium Score of 1-99 AND >75th percentile:     You have significant cholesterol (plaque) build-up in the arteries that supply blood flow to your heart. This does not mean you have any blockages in your arteries that require an intervention at this time, but you are at higher risk of developing heart   disease or a stroke. Therefore, you need to be aggressive about preventing further plaque build-up. We recommend treating this plaque with a healthy low saturated fat, low sugar, mostly plant based diet and regular exercise. We highly recommend the use   of aspirin (low dose, 81 mg once a day) and a cholesterol medication to help prevent further plaque build-up; please discuss these recommendations with your doctor. If you smoke, this likely has contributed to your cholesterol build-up, and you should   quit as soon as possible. Please let your doctor know if you need medications or other resources to help you quit. If you are overweight, we also recommend gradual weight loss until you reach a normal weight. Each of these recommendations will lower your   future risk of heart disease and stroke, and can even help decrease the amount of plaque you currently have. We do not routinely recommend any further testing based on this result.     Guidelines based upon the American College of Cardiology 2018 recommendations.            Assessment & Plan     1. Dyslipidemia        2. Familial hypercholesterolemia        3. Family history of early CAD        4. Essential hypertension            Medical Decision Making: Today's Assessment/Status/Plan:        Dyslipidemia:  -Followed by lipid clinic  -CT coronary score was 9.3  -Latest LDL was 97 on 4/19/2022 which was shortly after initial doses.  -Continue Praluent 150 mg every 2 weeks, office RN help patient with her  injection today  -Unable to tolerate statins and ezetimibe  -She will have follow-up lab testing due soon  -Discussed with patient that I do not believe her abdominal pain is related nor dyspnea with stairs.  -Encourage patient to increase exercise, likely having a degree of deconditioning  -And her abdominal symptoms likely due to her gastric bypass.  She will continue to monitor    Hypertension: Stable  -Continue chlorthalidone 25 mg daily    FU in clinic in 1 year with Dr. Dorsey. Sooner if needed.    Patient verbalizes understanding and agrees with the plan of care.     PLEASE NOTE: This Note was created using voice recognition Software. I have made every reasonable attempt to correct obvious errors, but I expect that there are errors of grammar and possibly content that I did not discover before finalizing the note

## 2022-08-15 NOTE — LETTER
[facility]  Eunice Grissom M.D.  [address] [place]  [place] [address]  Fax: [phone]   Authorization for Release/Disclosure of   Protected Health Information   Name: MARGARETH SMITH : 1970 SSN: [id number]   Address: [address] [place] [address] [place] [address] Phone:    [phone] (home)    I authorize the entity listed below to release/disclose the PHI below to:   [facility]/Eunice Grissom M.D. and JAVIER Curry   Provider or Entity Name:  {[facility] COLORECTAL SCREENING LOCATIONS:[id number]}   Reason for request: continuity of care   Information to be released:    [ X ] LAST COLONOSCOPY,  including any PATH REPORT and follow-up  [ X ] LAST FIT/COLOGUARD RESULT [  ] LAST DEXA  [  ] LAST MAMMOGRAM  [  ] LAST PAP  [  ] LAST LABS [  ] RETINA EXAM REPORT  [  ] IMMUNIZATION RECORDS  [  ] Release all info      [  ] Check here and initial the line next to each item to release ALL health information INCLUDING  _____ Care and treatment for drug and / or alcohol abuse  _____ HIV testing, infection status, or AIDS  _____ Genetic Testing    DATES OF SERVICE OR TIME PERIOD TO BE DISCLOSED: _____________  I understand and acknowledge that:  * This Authorization may be revoked at any time by you in writing, except if your health information has already been used or disclosed.  * Your health information that will be used or disclosed as a result of you signing this authorization could be re-disclosed by the recipient. If this occurs, your re-disclosed health information may no longer be protected by State or Federal laws.  * You may refuse to sign this Authorization. Your refusal will not affect your ability to obtain treatment.  * This Authorization becomes effective upon signing and will  on (date) __________.      If no date is indicated, this Authorization will  one (1) year from the signature date.    Name: Margareth Smith    Signature:   Date:     8/15/2022       PLEASE  FAX REQUESTED RECORDS BACK TO: [phone]

## 2022-08-19 PROCEDURE — RXMED WILLOW AMBULATORY MEDICATION CHARGE: Performed by: FAMILY MEDICINE

## 2022-08-19 RX ORDER — BNT162B2 0.23 MG/2.25ML
0.3 INJECTION, SUSPENSION INTRAMUSCULAR
Qty: 0.3 ML | Refills: 0 | Status: SHIPPED | OUTPATIENT
Start: 2022-08-19 | End: 2022-09-15

## 2022-08-22 ENCOUNTER — PHARMACY VISIT (OUTPATIENT)
Dept: PHARMACY | Facility: MEDICAL CENTER | Age: 52
End: 2022-08-22
Payer: MEDICARE

## 2022-09-07 ENCOUNTER — HOME STUDY (OUTPATIENT)
Dept: SLEEP MEDICINE | Facility: MEDICAL CENTER | Age: 52
End: 2022-09-07
Attending: PREVENTIVE MEDICINE
Payer: COMMERCIAL

## 2022-09-07 DIAGNOSIS — R06.83 SNORING: ICD-10-CM

## 2022-09-07 DIAGNOSIS — G47.30 BREATHING-RELATED SLEEP DISORDER: ICD-10-CM

## 2022-09-07 DIAGNOSIS — R06.81 WITNESSED EPISODE OF APNEA: ICD-10-CM

## 2022-09-07 DIAGNOSIS — G47.19 EXCESSIVE DAYTIME SLEEPINESS: ICD-10-CM

## 2022-09-07 PROCEDURE — 95806 SLEEP STUDY UNATT&RESP EFFT: CPT | Performed by: INTERNAL MEDICINE

## 2022-09-15 ENCOUNTER — OFFICE VISIT (OUTPATIENT)
Dept: MEDICAL GROUP | Facility: PHYSICIAN GROUP | Age: 52
End: 2022-09-15
Payer: COMMERCIAL

## 2022-09-15 VITALS
OXYGEN SATURATION: 96 % | BODY MASS INDEX: 29.44 KG/M2 | SYSTOLIC BLOOD PRESSURE: 120 MMHG | TEMPERATURE: 97.6 F | DIASTOLIC BLOOD PRESSURE: 78 MMHG | RESPIRATION RATE: 16 BRPM | HEIGHT: 62 IN | WEIGHT: 160 LBS | HEART RATE: 77 BPM

## 2022-09-15 DIAGNOSIS — R73.01 ELEVATED FASTING GLUCOSE: ICD-10-CM

## 2022-09-15 DIAGNOSIS — R53.82 CHRONIC FATIGUE: ICD-10-CM

## 2022-09-15 DIAGNOSIS — D45 POLYCYTHEMIA VERA (HCC): ICD-10-CM

## 2022-09-15 DIAGNOSIS — Z12.11 SCREENING FOR COLON CANCER: ICD-10-CM

## 2022-09-15 DIAGNOSIS — E78.2 MIXED DYSLIPIDEMIA: ICD-10-CM

## 2022-09-15 PROBLEM — Z90.3 HISTORY OF SLEEVE GASTRECTOMY: Status: ACTIVE | Noted: 2022-09-15

## 2022-09-15 PROCEDURE — 99214 OFFICE O/P EST MOD 30 MIN: CPT | Performed by: FAMILY MEDICINE

## 2022-09-15 RX ORDER — ATOMOXETINE 18 MG/1
CAPSULE ORAL
COMMUNITY
Start: 2022-01-01 | End: 2023-03-21

## 2022-09-15 ASSESSMENT — FIBROSIS 4 INDEX: FIB4 SCORE: 0.7

## 2022-09-15 NOTE — PROGRESS NOTES
Subjective:   Margareth Blake is a 51 y.o. female here today for evaluation and management of:     Polycythemia vera (HCC)  Stable, followed by specialist.     Chronic fatigue  Goes to bed early, wakes early.   Eats dinner early by 5 pm, good sleep hygiene, no screens etc at bedtime.   She is always exhausted  She has normal iron, elevated H/H  She has normal to high B vitamins  She is on lamotrigine for bipolar disorder, wonders if this could be causing the fatigue, been on it for years, will talk to her specialist about this.   Also on straterra for ADHD  She had a sleep study done and has follow up to discuss results.   She drinks one cup of coffee a day, no alcohol, no nicotine.     Mixed dyslipidemia  Good improvement with praluent 150 mg/ml SQ every 2 weeks.   Self injecting causing sig anxiety for patient.   Requests clinic injection.   Will see if pharmacy specialist can help with this or I can provide this for her in clinic.   MA visit for praluent SQ every 2 weeks. patient will have medication shipped to clinic.              Current medicines (including changes today)  Current Outpatient Medications   Medication Sig Dispense Refill    atomoxetine (STRATTERA) 18 MG capsule       traZODone (DESYREL) 50 MG Tab Take 1 Tablet by mouth every evening. (Patient taking differently: Take 50 mg by mouth as needed.) 90 Tablet 3    omeprazole (PRILOSEC) 40 MG delayed-release capsule Take 1 Capsule by mouth every day. 90 Capsule 3    chlorthalidone (HYGROTON) 25 MG Tab Take 1 Tablet by mouth every day. 90 Tablet 3    albuterol 108 (90 Base) MCG/ACT Aero Soln inhalation aerosol Inhale 2 Puffs every 6 hours as needed for Shortness of Breath. 8.5 g 5    Alirocumab (PRALUENT) 150 MG/ML Solution Auto-injector Inject 150 mg under the skin every 14 days. 6 mL 3    lamotrigine (LAMICTAL) 150 MG tablet Take 1 Tablet by mouth every day. 90 Tablet 3    ondansetron (ZOFRAN ODT) 8 MG TABLET DISPERSIBLE DISSOLVE 1 TABLET IN  "MOUTH EVERY 12 HOURS AS NEEDED FOR NAUSEA FOR VOMITING 15 Tablet 3     No current facility-administered medications for this visit.     She  has a past medical history of Asthma, Bipolar 1 disorder, depressed (HCC), Chickenpox, Constipation, Coronary heart disease, Daytime sleepiness, Frequent headaches, Frequent urination, GERD (gastroesophageal reflux disease), Maced[place] measles, Heart burn, Heartburn, High cholesterol (04/08/2021), Hypertension (04/08/2021), Hypothyroidism, Insomnia, Morning headache, Painful joint, Tonsillitis, Toothache, Weakness, Weight loss, and Whooping cough.    ROS  No chest pain, no shortness of breath, no abdominal pain       Objective:     /78 (BP Location: Left arm, Patient Position: Sitting, BP Cuff Size: Adult)   Pulse 77   Temp 36.4 °C (97.6 °F) (Temporal)   Resp 16   Ht 1.575 m (5' 2\")   Wt 72.6 kg (160 lb)   SpO2 96%  Body mass index is 29.26 kg/m².   Physical Exam:  Constitutional: Alert, no distress.  Skin: Warm, dry, good turgor, no rashes in visible areas.  Eye: Equal, round and reactive, conjunctiva clear, lids normal.  ENMT: Lips without lesions, good dentition, oropharynx clear.  Neck: Trachea midline, no masses, no thyromegaly. No cervical or supraclavicular lymphadenopathy  Respiratory: Unlabored respiratory effort, lungs clear to auscultation, no wheezes, no ronchi.  Cardiovascular: Normal S1, S2, no murmur, no edema.  Abdomen: Soft, non-tender, no masses, no hepatosplenomegaly.  Psych: Alert and oriented x3, normal affect and mood.        Assessment and Plan:   The following treatment plan was discussed    1. Polycythemia vera (HCC)    2. Chronic fatigue  - TSH WITH REFLEX TO FT4; Future    3. Mixed dyslipidemia    Other orders  - atomoxetine (STRATTERA) 18 MG capsule      Followup: Return for MA visit for praluent SQ every 2 weeks. patient will have medication shipped to clinic. .           "

## 2022-09-15 NOTE — ASSESSMENT & PLAN NOTE
Goes to bed early, wakes early.   Eats dinner early by 5 pm, good sleep hygiene, no screens etc at bedtime.   She is always exhausted  She has normal iron, elevated H/H  She has normal to high B vitamins  She is on lamotrigine for bipolar disorder, wonders if this could be causing the fatigue, been on it for years, will talk to her specialist about this.   Also on straterra for ADHD  She had a sleep study done and has follow up to discuss results.   She drinks one cup of coffee a day, no alcohol, no nicotine.

## 2022-09-15 NOTE — ASSESSMENT & PLAN NOTE
Good improvement with praluent 150 mg/ml SQ every 2 weeks.   Self injecting causing sig anxiety for patient.   Requests clinic injection.   Will see if pharmacy specialist can help with this or I can provide this for her in clinic.   MA visit for praluent SQ every 2 weeks. patient will have medication shipped to clinic.

## 2022-09-17 NOTE — PROCEDURES
Interpretation:    This home sleep study was performed on 9/7/2022 using a ResMed ApneaLink Air type III device.  The total recording time duration was 5 hours 52 minutes, the monitoring time (flow) duration was 4 hours 55 minutes, and the oxygen saturation evaluation duration was 4 hours 57 minutes.  The overall IRENE was 12.2, the supine IRENE was 4.8, the nonsupine IRENE was 0.0, and the upright IRENE was 0.0.  The total number of apneas and hypopneas was 60.  The Cheyne- Ruiz respiration time was 0 minutes and the percentage was 0%.  The oxygen desaturation index was 12.5 and the patient experienced 60 to total desaturations.    The baseline oxygen saturation was 94%, the average oxygen saturation was 90%, and the lowest oxygen saturation was 78%.  The saturations were less than or equal to 88% for 26% or for 1 hour 18 minutes. The patient experienced 4595 breaths or 15.5 breaths per minute.  104 snores were recorded. The minimum heart rate was 55, the average heart rate was 64, and the maximum heart rate was 133 beats per minute.        Assessment:    Mild sleep apnea hypopnea - IRENE 12.2  Significant nocturnal desaturation - regina saturation 78% - saturations less than or equal to 88% for 26% of the TIB        Recommendation:    If significant signs, symptoms, and or comorbidities warrant, recommend a positive airway pressure titration. Alternative treatments for OSAH include behavioral modification, use of a dental appliance, and nasopharyngeal reconstructive surgery. Behavior modification includes weight loss, eliminating alcohol and sedatives, and avoiding the supine position. If alternative treatments are used, a follow-up diagnostic study is warranted to ensure efficacy.  Clinical correlation is needed.  An empiric trial of auto titrating CPAP may be an acceptable option.          Dr. Swapnil Quinones M.D.

## 2022-09-20 PROCEDURE — RXMED WILLOW AMBULATORY MEDICATION CHARGE: Performed by: FAMILY MEDICINE

## 2022-09-21 ENCOUNTER — OFFICE VISIT (OUTPATIENT)
Dept: SLEEP MEDICINE | Facility: MEDICAL CENTER | Age: 52
End: 2022-09-21
Payer: COMMERCIAL

## 2022-09-21 VITALS
HEART RATE: 76 BPM | BODY MASS INDEX: 28.53 KG/M2 | RESPIRATION RATE: 14 BRPM | HEIGHT: 63 IN | WEIGHT: 161 LBS | SYSTOLIC BLOOD PRESSURE: 108 MMHG | OXYGEN SATURATION: 94 % | DIASTOLIC BLOOD PRESSURE: 70 MMHG

## 2022-09-21 DIAGNOSIS — G47.34 NOCTURNAL HYPOXIA: ICD-10-CM

## 2022-09-21 DIAGNOSIS — G47.33 OSA (OBSTRUCTIVE SLEEP APNEA): Primary | ICD-10-CM

## 2022-09-21 DIAGNOSIS — D45 POLYCYTHEMIA VERA (HCC): ICD-10-CM

## 2022-09-21 PROCEDURE — 99213 OFFICE O/P EST LOW 20 MIN: CPT | Performed by: STUDENT IN AN ORGANIZED HEALTH CARE EDUCATION/TRAINING PROGRAM

## 2022-09-21 ASSESSMENT — FIBROSIS 4 INDEX: FIB4 SCORE: 0.7

## 2022-09-21 NOTE — PROGRESS NOTES
[facility] Follow-up Visit    CC: Follow-up to discuss sleep study results      HPI:  Margareth Blake is a 51 y.o.female  with dyslipidemia, anxiety, GERD, bipolar disorder, hypertension, chronic fatigue, daytime sleepiness, and obstructive sleep apnea.  Presents today to discuss sleep study results.    She reports night sleep study was a typical night sleep for her.  She did not notice anything out of the ordinary that would disrupt her sleep at night.  She continues to have trouble with low energy during the day and feeling fatigued.  She is concerned regarding her oxygen levels at night.    At the last visit with our office and she did complain of snoring, witnessed apneas, excessive daytime sleepiness with [place] Sleepiness Scale of 14, frequent nocturnal awakenings and daytime fatigue.      Patient Active Problem List    Diagnosis Date Noted    Chronic fatigue 09/15/2022    Mixed dyslipidemia 09/15/2022    History of sleeve gastrectomy 09/15/2022    Liver hemangioma 04/28/2022    Polycythemia vera (HCC) 03/11/2022    Elevated hemoglobin (HCC) 01/28/2022    Prediabetes 01/28/2022    Overweight 01/28/2022    Fatty liver 01/28/2022    Constipation 01/28/2022    S/P bariatric surgery 04/28/2021    Obesity (BMI 30-39.9) 04/28/2021    Hypertriglyceridemia 04/10/2019    Hallux valgus of left foot 04/10/2019    Skin lesion 04/10/2019    Anxiety 02/19/2019    Gastroesophageal reflux disease 02/19/2019    Uncomplicated asthma 10/11/2017    Bipolar affective disorder, currently depressed, mild (HCC) 10/11/2017    Essential hypertension 10/11/2017       Past Medical History:   Diagnosis Date    Asthma     Bipolar 1 disorder, depressed (HCC)     Chickenpox     Constipation     Coronary heart disease     Daytime sleepiness     Frequent headaches     Frequent urination     GERD (gastroesophageal reflux disease)     Khmer measles     Heart burn     Heartburn     High cholesterol 04/08/2021    Hypertension  2021    Hypothyroidism     Insomnia     Morning headache     Painful joint     Tonsillitis     Toothache     Weakness     Weight loss     Whooping cough         Past Surgical History:   Procedure Laterality Date    MN LAP, CORINNE RESTRICT PROC, LONGITUDINAL GAS*  2021    Procedure: GASTRECTOMY, SLEEVE, LAPAROSCOPIC;  Surgeon: Houston Pickett M.D.;  Location: SURGERY [facility];  Service: General    MN UNLISTED LAPAROSCOPIC PROC, LIVER  2021    Procedure: BIOPSY, LIVER, LAPAROSCOPIC.;  Surgeon: Houston Pickett M.D.;  Location: SURGERY [facility];  Service: General    HYSTERECTOMY LAPAROSCOPY      HYSTERECTOMY, TOTAL ABDOMINAL      SLEEVE,SOPHIE VASO THIGH         History reviewed. No pertinent family history.    Social History     Socioeconomic History    Marital status: Single     Spouse name: Not on file    Number of children: Not on file    Years of education: Not on file    Highest education level: Not on file   Occupational History    Not on file   Tobacco Use    Smoking status: Former     Packs/day: 0.00     Years: 25.00     Pack years: 0.00     Types: Cigarettes     Start date: 1990     Quit date: 2015     Years since quittin.7    Smokeless tobacco: Never    Tobacco comments:     No tobacco 6 years   Vaping Use    Vaping Use: Never used   Substance and Sexual Activity    Alcohol use: No    Drug use: Yes     Types: Marijuana     Comment: sometimes eat edibles for sleep    Sexual activity: Never   Other Topics Concern    Not on file   Social History Narrative    Not on file     Social Determinants of Health     Financial Resource Strain: Not on file   Food Insecurity: Not on file   Transportation Needs: Not on file   Physical Activity: Not on file   Stress: Not on file   Social Connections: Not on file   Intimate Partner Violence: Not on file   Housing Stability: Not on file       Current Outpatient Medications   Medication Sig Dispense Refill    atomoxetine (STRATTERA) 18 MG capsule     "   traZODone (DESYREL) 50 MG Tab Take 1 Tablet by mouth every evening. (Patient taking differently: Take 50 mg by mouth as needed.) 90 Tablet 3    omeprazole (PRILOSEC) 40 MG delayed-release capsule Take 1 Capsule by mouth every day. 90 Capsule 3    chlorthalidone (HYGROTON) 25 MG Tab Take 1 Tablet by mouth every day. 90 Tablet 3    albuterol 108 (90 Base) MCG/ACT Aero Soln inhalation aerosol Inhale 2 Puffs every 6 hours as needed for Shortness of Breath. 8.5 g 5    Alirocumab (PRALUENT) 150 MG/ML Solution Auto-injector Inject 150 mg under the skin every 14 days. 6 mL 3    lamotrigine (LAMICTAL) 150 MG tablet Take 1 Tablet by mouth every day. 90 Tablet 3    ondansetron (ZOFRAN ODT) 8 MG TABLET DISPERSIBLE DISSOLVE 1 TABLET IN MOUTH EVERY 12 HOURS AS NEEDED FOR NAUSEA FOR VOMITING 15 Tablet 3    atomoxetine (STRATTERA) 18 MG capsule Take 1 Capsule by mouth every morning. 90 Capsule 3     No current facility-administered medications for this visit.        ALLERGIES: Atorvastatin, Dill weed oil [dill oil], Dust mite extract, Molds & smuts, and Penicillins    ROS  Constitutional: Denies fevers, Denies weight changes  Ears/Nose/Throat/Mouth: Denies nasal congestion or sore throat   Cardiovascular: Denies chest pain  Respiratory: Denies shortness of breath, Denies cough  Gastrointestinal/Hepatic: Denies nausea, vomiting  Sleep: see HPI      PHYSICAL EXAM  /70 (BP Location: Left arm, Patient Position: Sitting, BP Cuff Size: Large adult)   Pulse 76   Resp 14   Ht 1.6 m (5' 3\")   Wt 73 kg (161 lb)   SpO2 94%   BMI 28.52 kg/m²   Appearance: Well-nourished, well-developed, no acute distress  Eyes:  No scleral icterus , EOMI  ENMT: masked  Musculoskeletal:  Grossly normal; gait and station normal; digits and nails normal  Skin:  No rashes, petechiae, cyanosis  Neurologic: without focal signs; oriented to person, time, place, and purpose; judgement intact      Medical Decision Making   Assessment and Plan    Margareth" Keiko Blake is a 51 y.o.female  with dyslipidemia, anxiety, GERD, bipolar disorder, hypertension, chronic fatigue, daytime sleepiness, and obstructive sleep apnea.  Presents today to discuss sleep study results.    Obstructive sleep apnea   Reviewed recent HST with patient showing an AHI of 12.2 and Min Oxygen saturation of 78%.  Time spent at or below 88% saturation of 1 hour 18 minutes.  Nocturnal hypoxia out of portion to severity of sleep apnea.  Based on sleep study and symptoms meets criteria for Mild obstructive sleep apnea.   We discussed the pathophysiology of obstructive sleep apnea (BEATRICE) and risk factors for the disease. We also discussed possible consequences of untreated BEATRICE, including excessive daytime sleepiness and fatigue, cognitive dysfunction, cardiovascular complications such as elevated blood pressure, heart attacks, cardiac arrhythmias, and strokes. We discussed how BEATRICE typically gets worse with age. We discussed treatment options for BEATRICE, including the gold standard therapy (PAP), alternative options such as a mandibular advancement device (custom-made oral appliances) and surgeries. We will proceed CPAP therapy.     RECOMMENDATIONS  -Start Auto CPAP at pressures 4-7 cm H2O  -Discussed importance of adherence/compliance   -Prescription generated for supplies   -Once stable on CPAP therapy would recommend overnight pulse oximetry study.  -Patient counseled to avoid driving when sleepy. Encouraged to anticipate sleepiness, consider taking a 10 min nap prior to driving, alternate with another , or pull over if sleepy while driving  -Advised to contact our office or myself with any questions via [facility]    Positive airway pressure will favorably impact many of the adverse conditions and effects provoked by BEATRICE.    Have advised the patient to follow up with the appropriate healthcare practitioners for all other medical problems and issues.    Return for 1-2 months after starting PAP  therapy.      Please note portions of this record was created using voice recognition software. I have made every reasonable attempt to correct obvious errors, but I expect that there are errors of grammar and possibly content I did not discover before finalizing the note.

## 2022-09-22 ENCOUNTER — PHARMACY VISIT (OUTPATIENT)
Dept: PHARMACY | Facility: MEDICAL CENTER | Age: 52
End: 2022-09-22
Payer: MEDICARE

## 2022-10-10 ENCOUNTER — HOSPITAL ENCOUNTER (OUTPATIENT)
Dept: LAB | Facility: MEDICAL CENTER | Age: 52
End: 2022-10-10
Attending: NURSE PRACTITIONER
Payer: COMMERCIAL

## 2022-10-10 ENCOUNTER — HOSPITAL ENCOUNTER (OUTPATIENT)
Dept: LAB | Facility: MEDICAL CENTER | Age: 52
End: 2022-10-10
Attending: INTERNAL MEDICINE
Payer: COMMERCIAL

## 2022-10-10 ENCOUNTER — HOSPITAL ENCOUNTER (OUTPATIENT)
Dept: LAB | Facility: MEDICAL CENTER | Age: 52
End: 2022-10-10
Attending: FAMILY MEDICINE
Payer: COMMERCIAL

## 2022-10-10 DIAGNOSIS — R53.82 CHRONIC FATIGUE: ICD-10-CM

## 2022-10-10 DIAGNOSIS — R73.01 ELEVATED FASTING GLUCOSE: ICD-10-CM

## 2022-10-10 DIAGNOSIS — D45 POLYCYTHEMIA VERA (HCC): ICD-10-CM

## 2022-10-10 DIAGNOSIS — E78.2 MIXED HYPERLIPIDEMIA: ICD-10-CM

## 2022-10-10 LAB
ALBUMIN SERPL BCP-MCNC: 4.3 G/DL (ref 3.2–4.9)
ALBUMIN/GLOB SERPL: 1.3 G/DL
ALP SERPL-CCNC: 49 U/L (ref 30–99)
ALT SERPL-CCNC: 12 U/L (ref 2–50)
ANION GAP SERPL CALC-SCNC: 12 MMOL/L (ref 7–16)
AST SERPL-CCNC: 19 U/L (ref 12–45)
BASOPHILS # BLD AUTO: 0.5 % (ref 0–1.8)
BASOPHILS # BLD: 0.03 K/UL (ref 0–0.12)
BILIRUB SERPL-MCNC: 0.6 MG/DL (ref 0.1–1.5)
BUN SERPL-MCNC: 12 MG/DL (ref 8–22)
CALCIUM SERPL-MCNC: 9.8 MG/DL (ref 8.5–10.5)
CHLORIDE SERPL-SCNC: 101 MMOL/L (ref 96–112)
CHOLEST SERPL-MCNC: 195 MG/DL (ref 100–199)
CO2 SERPL-SCNC: 26 MMOL/L (ref 20–33)
CREAT SERPL-MCNC: 0.74 MG/DL (ref 0.5–1.4)
EOSINOPHIL # BLD AUTO: 0.28 K/UL (ref 0–0.51)
EOSINOPHIL NFR BLD: 4.7 % (ref 0–6.9)
ERYTHROCYTE [DISTWIDTH] IN BLOOD BY AUTOMATED COUNT: 42.1 FL (ref 35.9–50)
EST. AVERAGE GLUCOSE BLD GHB EST-MCNC: 108 MG/DL
FASTING STATUS PATIENT QL REPORTED: NORMAL
GFR SERPLBLD CREATININE-BSD FMLA CKD-EPI: 99 ML/MIN/1.73 M 2
GLOBULIN SER CALC-MCNC: 3.4 G/DL (ref 1.9–3.5)
GLUCOSE SERPL-MCNC: 102 MG/DL (ref 65–99)
HBA1C MFR BLD: 5.4 % (ref 4–5.6)
HCT VFR BLD AUTO: 48.7 % (ref 37–47)
HDLC SERPL-MCNC: 46 MG/DL
HGB BLD-MCNC: 17.2 G/DL (ref 12–16)
IMM GRANULOCYTES # BLD AUTO: 0.02 K/UL (ref 0–0.11)
IMM GRANULOCYTES NFR BLD AUTO: 0.3 % (ref 0–0.9)
IRON SATN MFR SERPL: 70 % (ref 15–55)
IRON SERPL-MCNC: 168 UG/DL (ref 40–170)
LDLC SERPL CALC-MCNC: 96 MG/DL
LYMPHOCYTES # BLD AUTO: 2.51 K/UL (ref 1–4.8)
LYMPHOCYTES NFR BLD: 42 % (ref 22–41)
MCH RBC QN AUTO: 33.9 PG (ref 27–33)
MCHC RBC AUTO-ENTMCNC: 35.3 G/DL (ref 33.6–35)
MCV RBC AUTO: 95.9 FL (ref 81.4–97.8)
MONOCYTES # BLD AUTO: 0.43 K/UL (ref 0–0.85)
MONOCYTES NFR BLD AUTO: 7.2 % (ref 0–13.4)
NEUTROPHILS # BLD AUTO: 2.71 K/UL (ref 2–7.15)
NEUTROPHILS NFR BLD: 45.3 % (ref 44–72)
NRBC # BLD AUTO: 0 K/UL
NRBC BLD-RTO: 0 /100 WBC
PLATELET # BLD AUTO: 356 K/UL (ref 164–446)
PMV BLD AUTO: 9.8 FL (ref 9–12.9)
POTASSIUM SERPL-SCNC: 4.1 MMOL/L (ref 3.6–5.5)
PROT SERPL-MCNC: 7.7 G/DL (ref 6–8.2)
RBC # BLD AUTO: 5.08 M/UL (ref 4.2–5.4)
SODIUM SERPL-SCNC: 139 MMOL/L (ref 135–145)
TIBC SERPL-MCNC: 239 UG/DL (ref 250–450)
TRIGL SERPL-MCNC: 267 MG/DL (ref 0–149)
TSH SERPL DL<=0.005 MIU/L-ACNC: 1.38 UIU/ML (ref 0.38–5.33)
UIBC SERPL-MCNC: 71 UG/DL (ref 110–370)
VIT B12 SERPL-MCNC: 861 PG/ML (ref 211–911)
WBC # BLD AUTO: 6 K/UL (ref 4.8–10.8)

## 2022-10-10 PROCEDURE — 80053 COMPREHEN METABOLIC PANEL: CPT

## 2022-10-10 PROCEDURE — 84443 ASSAY THYROID STIM HORMONE: CPT

## 2022-10-10 PROCEDURE — 83036 HEMOGLOBIN GLYCOSYLATED A1C: CPT

## 2022-10-10 PROCEDURE — 36415 COLL VENOUS BLD VENIPUNCTURE: CPT

## 2022-10-10 PROCEDURE — 85025 COMPLETE CBC W/AUTO DIFF WBC: CPT

## 2022-10-10 PROCEDURE — 80061 LIPID PANEL: CPT

## 2022-10-10 PROCEDURE — 83550 IRON BINDING TEST: CPT

## 2022-10-10 PROCEDURE — 82607 VITAMIN B-12: CPT

## 2022-10-10 PROCEDURE — 83540 ASSAY OF IRON: CPT

## 2022-10-11 PROCEDURE — RXMED WILLOW AMBULATORY MEDICATION CHARGE: Performed by: FAMILY MEDICINE

## 2022-10-13 ENCOUNTER — PHARMACY VISIT (OUTPATIENT)
Dept: PHARMACY | Facility: MEDICAL CENTER | Age: 52
End: 2022-10-13
Payer: MEDICARE

## 2022-11-18 ENCOUNTER — NON-PROVIDER VISIT (OUTPATIENT)
Dept: MEDICAL GROUP | Facility: PHYSICIAN GROUP | Age: 52
End: 2022-11-18
Payer: COMMERCIAL

## 2022-11-18 DIAGNOSIS — E78.2 MIXED DYSLIPIDEMIA: ICD-10-CM

## 2022-11-18 PROCEDURE — 99401 PREV MED CNSL INDIV APPRX 15: CPT | Performed by: FAMILY MEDICINE

## 2022-11-18 NOTE — PROGRESS NOTES
[facility]   Date of Service: 11/18/22  Time 121pm-135    Eric Smith has been referred for evaluation and management of dyslipidemia    Referral Source: Dr. Dorsey     Subjective    HPI  History of ASCVD:   Possible due to CAC of 9.3, and likely this underestimates her degree of plaque due to her age.  She is in the 75th percentile.  Other Established (non-atherosclerotic) Vascular Disease, if Present: None  Age at Initial Diagnosis of Dyslipidemia: 20s    Current Prescription Lipid Lowering Medications - including dose:   Statin: None  Non-Statin:  Praluent 150 mg every 14 days  Current Lipid Lowering and Related Supplements:   Vitamin D  Any Current Side Effects Potentially Related to Lipid Lowering therapy?   no  Current Adherence to Lipid Lowering Therapies   complete  Previously Attempted Interventions for Lipids - including outcome  Statin: Crestor 20mg, and Lipitor 40mg    Outcome: muscle pain, N/V, vertigo   Non-Statin: lopid 600mg    Outcome: cannot remember, but thinks it may have made her myalgias worse  Any Previous History of Statin Intolerance?   Yes, Crestor and Lipitor. Also muscle pain with Zetia and Lopid  Baseline Lipids Prior to Treatment:   Results for ERIC SMITH (MRN [id number]) as of 3/18/2022 13:47   Ref. Range 1/12/2022 06:28   Cholesterol,Tot Latest Ref Range: 100 - 199 mg/dL 352 (H)   Triglycerides Latest Ref Range: 0 - 149 mg/dL 424 (H)   HDL Latest Ref Range: >=40 mg/dL 38 (A)   LDL Latest Ref Range: <100 mg/dL see below     Results for ERIC SMITH (MRN [id number]) as of 5/6/2022 13:26   Ref. Range 4/19/2022 06:30   Lipoprotein (a) Latest Ref Range: <=29 mg/dL <6       Other Pertinent History: no    History of other CV risk factors: family history     PAST MEDICAL HISTORY:  has a past medical history of Asthma, Bipolar 1 disorder, depressed (HCC), Chickenpox, Constipation, Coronary heart disease, Daytime sleepiness, Frequent headaches, Frequent  urination, GERD (gastroesophageal reflux disease), [place] measles, Heart burn, Heartburn, High cholesterol (2021), Hypertension (2021), Hypothyroidism, Insomnia, Morning headache, Painful joint, Tonsillitis, Toothache, Weakness, Weight loss, and Whooping cough.    PAST SURGICAL HISTORY:  has a past surgical history that includes hysterectomy, total abdominal; pr lap, pedro restrict proc, longitudinal gas* (2021); pr unlisted laparoscopic proc, liver (2021); Sleeve,Demetrius Vaso Thigh; and hysterectomy laparoscopy.    CURRENT MEDICATIONS:   Current Outpatient Medications:     atomoxetine,     traZODone, 50 mg, Oral, Nightly (Patient taking differently: 50 mg, Oral, PRN)    omeprazole, 40 mg, Oral, DAILY    chlorthalidone, 25 mg, Oral, DAILY    albuterol, 2 Puff, Inhalation, Q6HRS PRN    Praluent, 150 mg, Subcutaneous, Q14 DAYS    lamotrigine, 150 mg, Oral, DAILY    ondansetron, DISSOLVE 1 TABLET IN MOUTH EVERY 12 HOURS AS NEEDED FOR NAUSEA FOR VOMITING    atomoxetine, 18 mg, Oral, QAM    ALLERGIES: Atorvastatin, Dust mite extract, Molds & smuts, and Penicillins    FAMILY HISTORY:   Grandfather- 48 MI, and 52  of MI   Dad -40's, MI    SOCIAL HISTORY   Social History     Tobacco Use   Smoking Status Former    Packs/day: 0.00    Years: 25.00    Pack years: 0.00    Types: Cigarettes    Start date: 1990    Quit date: 2015    Years since quittin.8   Smokeless Tobacco Never   Tobacco Comments    No tobacco 6 years     Change in weight: lost about 5lb in 2 months   Exercise habits: moderate regular exercise program  Diet: low carbohydrate, high protein, no fried foods, no red meat     63% lean protein    Objective      There were no vitals filed for this visit.     Physical Exam    DATA REVIEW:  Most Recent Lipid Panel:   Lab Results   Component Value Date    CHOLSTRLTOT 195 10/10/2022    TRIGLYCERIDE 267 (H) 10/10/2022    HDL 46 10/10/2022    LDL 96 10/10/2022       Other Pertinent Blood  Work:   Lab Results   Component Value Date    SODIUM 139 10/10/2022    POTASSIUM 4.1 10/10/2022    CHLORIDE 101 10/10/2022    CO2 26 10/10/2022    ANION 12.0 10/10/2022    GLUCOSE 102 (H) 10/10/2022    BUN 12 10/10/2022    CREATININE 0.74 10/10/2022    CALCIUM 9.8 10/10/2022    ASTSGOT 19 10/10/2022    ALTSGPT 12 10/10/2022    ALKPHOSPHAT 49 10/10/2022    TBILIRUBIN 0.6 10/10/2022    ALBUMIN 4.3 10/10/2022    AGRATIO 1.3 10/10/2022    LIPOPROTA <6 04/19/2022    TSHULTRASEN 1.380 10/10/2022       Other:     Recent Imaging Studies:    Coronary calcification:  LMA - 0.0  LCX - 9.3  LAD - 0.0  RCA - 0.0  PDA - 0.0     Total Calcium Score: 9.3     Percentile: Calcium score is above the 75th percentile for the patient's age and sex.      ASSESSMENT AND PLAN  Patient Type, check all that apply:   likely familial hypercholesterolemia.    Recently had to stop Lipitor due to intolerance.    History of a gastric sleeve.   Established Atherosclerotic Cardiovascular Disease (ASCVD)  Some cardiovascular disease due to a coronary artery calcium score of 9.3, but below the threshold of 300.    This likely underestimates the degree of plaque due to her age.  Other Established (non-atherosclerotic) Vascular Disease, if Present:  none  Evidence of Heterozygous Familial Hypercholesterolemia (FH):   Possible LDL levels above 190.   Total cholesterol 352, HDLs 38, non-HDLs = 314. Therefore, LDLs likely >190, but unfortunately no direct LDL was measured at this time.  Family history of premature coronary artery disease and death in multiple relatives  ACC/AHA Indication for Statin Therapy, eryn all that apply:  LDL-C at baseline >190 mg/dl: Indication for High intensity statin , But appears intolerant after using Crestor and Lipitor  Starting to see plaque in the LCX  Intolerant to Zetia and Lopid  Calculated Risk for ASCVD, if applicable    N/A, lifetime risk greater than 50%  Other Significant Risk Markers, if any, eryn all that apply    elevated coronary calcium score and Family history of premature ASCVD in first degree relative  Goal LDL-C and nonHDL-C based on Clinic Protocol  LDL-C:   <100 mg/dL,  Lifestyle Recommendations From Today’s Visit:    Continue to focus on weight loss and exercise  Statin Therapy Recommendations from Today’s Visit:   Unable to tolerate statins  Non-Statin Medications Recommendations from Today’s Visit:   Unable to tolerate Zetia   Unable to tolerate Lopid   Declined fenofibrate  Indication for PCSK9 Inhibitor, if applicable:  FH with suboptimal control of LDL-C despite maximally tolerated statin and zetia    Continue Praluent 150 mg every 14 days  Supplements Recommended at this visit:   Continue vitamin D  Recommendations for Other Cardiovascular Risk Factors, eryn all that apply:   Continue to focus on weight loss  Other Issues:    Studies Ordered at Todays Visit:  Blood Work Ordered At Today’s visit: Lipid panel,   Follow-Up: 12 months     Fermín Puentes, PharmD    CC:  Cheruba Abraham, M.D. Dr. Bloch Janeen [facility]       Fermín Puentes, PharmD

## 2022-11-22 ENCOUNTER — PHARMACY VISIT (OUTPATIENT)
Dept: PHARMACY | Facility: MEDICAL CENTER | Age: 52
End: 2022-11-22
Payer: MEDICARE

## 2022-11-22 PROCEDURE — RXMED WILLOW AMBULATORY MEDICATION CHARGE: Performed by: FAMILY MEDICINE

## 2022-11-23 ENCOUNTER — DOCUMENTATION (OUTPATIENT)
Dept: PHARMACY | Facility: MEDICAL CENTER | Age: 52
End: 2022-11-23
Payer: COMMERCIAL

## 2022-12-09 ENCOUNTER — NON-PROVIDER VISIT (OUTPATIENT)
Dept: MEDICAL GROUP | Facility: PHYSICIAN GROUP | Age: 52
End: 2022-12-09
Payer: COMMERCIAL

## 2022-12-09 DIAGNOSIS — Z23 NEED FOR VACCINATION: ICD-10-CM

## 2022-12-09 PROCEDURE — 90472 IMMUNIZATION ADMIN EACH ADD: CPT | Performed by: FAMILY MEDICINE

## 2022-12-09 PROCEDURE — 90686 IIV4 VACC NO PRSV 0.5 ML IM: CPT | Performed by: FAMILY MEDICINE

## 2022-12-09 PROCEDURE — 90471 IMMUNIZATION ADMIN: CPT | Performed by: FAMILY MEDICINE

## 2022-12-09 PROCEDURE — 90746 HEPB VACCINE 3 DOSE ADULT IM: CPT | Performed by: FAMILY MEDICINE

## 2022-12-14 PROCEDURE — RXMED WILLOW AMBULATORY MEDICATION CHARGE: Performed by: FAMILY MEDICINE

## 2022-12-17 NOTE — PROGRESS NOTES
01/10/23    Subjective    Chief Complaint:  Follow up polycythemia vera    HPI:  52 female with h/o bariatric surgery and low level positive NINA 2 mutation but normal epo level. Hgb 17.3. WBC and platelet counts are normal. Had sleep study - no apnea but does get low oxygen levels. Complains of aching in her bones, especially shins.     ROS:    Constitutional: No weight loss  Skin: No rash or jaundice  HENT: No change in eyesight or hearing  Cardiovascular:No chest pain or arrythmia  Respiratory:No cough or SOB  GI:No nausea, vomiting, diarrhea, constipation  :No dysuria or frequency  Musculoskeletal:No bone or joint pain  Neuro:No sx's of neuropathy  Psych: No complaints    PMH:      Allergies   Allergen Reactions    Atorvastatin Myalgia    Dust Mite Extract Unspecified     Other reaction(s): Unknown  Other reaction(s): Unknown    Molds & Smuts Unspecified    Penicillins Hives and Unspecified     rash       Past Medical History:   Diagnosis Date    Asthma     Bipolar 1 disorder, depressed (HCC)     Chickenpox     Constipation     Coronary heart disease     Daytime sleepiness     Frequent headaches     Frequent urination     GERD (gastroesophageal reflux disease)     [place]uanian measles     Heart burn     Heartburn     High cholesterol 04/08/2021    Hypertension 04/08/2021    Hypothyroidism     Insomnia     Morning headache     Painful joint     Tonsillitis     Toothache     Weakness     Weight loss     Whooping cough         Past Surgical History:   Procedure Laterality Date    ID LAP, CORINNE RESTRICT PROC, LONGITUDINAL GAS*  04/16/2021    Procedure: GASTRECTOMY, SLEEVE, LAPAROSCOPIC;  Surgeon: Houston Pickett M.D.;  Location: SURGERY [facility];  Service: General    ID UNLISTED LAPAROSCOPIC PROC, LIVER  04/16/2021    Procedure: BIOPSY, LIVER, LAPAROSCOPIC.;  Surgeon: Houston Pickett M.D.;  Location: SURGERY [facility];  Service: General    HYSTERECTOMY LAPAROSCOPY      HYSTERECTOMY, TOTAL ABDOMINAL      SLEEVE,SOPHIE VASO  "THIGH          Medications:    Current Outpatient Medications on File Prior to Encounter   Medication Sig Dispense Refill    atomoxetine (STRATTERA) 18 MG capsule       traZODone (DESYREL) 50 MG Tab Take 1 Tablet by mouth every evening. (Patient taking differently: Take 50 mg by mouth as needed.) 90 Tablet 3    omeprazole (PRILOSEC) 40 MG delayed-release capsule Take 1 Capsule by mouth every day. 90 Capsule 3    chlorthalidone (HYGROTON) 25 MG Tab Take 1 Tablet by mouth every day. 90 Tablet 3    albuterol 108 (90 Base) MCG/ACT Aero Soln inhalation aerosol Inhale 2 Puffs every 6 hours as needed for Shortness of Breath. 8.5 g 5    Alirocumab (PRALUENT) 150 MG/ML Solution Auto-injector Inject 150 mg under the skin every 14 days. 6 mL 3    lamotrigine (LAMICTAL) 150 MG tablet Take 1 Tablet by mouth every day. 90 Tablet 3    ondansetron (ZOFRAN ODT) 8 MG TABLET DISPERSIBLE DISSOLVE 1 TABLET IN MOUTH EVERY 12 HOURS AS NEEDED FOR NAUSEA FOR VOMITING 15 Tablet 3    atomoxetine (STRATTERA) 18 MG capsule Take 1 Capsule by mouth every morning. 90 Capsule 3     No current facility-administered medications on file prior to encounter.       Social History     Tobacco Use    Smoking status: Former     Packs/day: 0.00     Years: 25.00     Pack years: 0.00     Types: Cigarettes     Start date: 1990     Quit date: 2015     Years since quittin.0    Smokeless tobacco: Never    Tobacco comments:     No tobacco 6 years   Substance Use Topics    Alcohol use: No        History reviewed. No pertinent family history.     Objective    Vitals:    /70 (BP Location: Left arm, Patient Position: Sitting, BP Cuff Size: Adult)   Pulse 79   Temp 36 °C (96.8 °F) (Temporal)   Resp 18   Ht 1.6 m (5' 2.99\")   Wt 73.8 kg (162 lb 11.2 oz)   SpO2 93%   BMI 28.83 kg/m²     Physical Exam:    Appears well-developed and well-nourished. No distress.    Head -  Normocephalic .   Eyes - Pupils are equal. Conjunctivae normal. No scleral " icterus.   Ears - normal hearing  Neurological -   Alert and oriented.  Skin - . No rash noted. Not diaphoretic. No erythema. No pallor. No jaundice   Psychiatric -  Normal mood and affect.    Labs:     Latest Reference Range & Units 03/08/22 07:26 04/19/22 06:31 06/04/22 07:43 10/10/22 07:55 01/04/23 07:27   WBC 4.8 - 10.8 K/uL 8.7 9.1 7.7 6.0 8.9   RBC 4.20 - 5.40 M/uL 5.17 4.96 5.17 5.08 5.20   Hemoglobin 12.0 - 16.0 g/dL 17.2 (H) 16.4 (H) 17.3 (H) 17.2 (H) 17.3 (H)   Hematocrit 37.0 - 47.0 % 49.3 (H) 47.3 (H) 49.2 (H) 48.7 (H) 49.5 (H)   MCV 81.4 - 97.8 fL 95.4 95.4 95.2 95.9 95.2   MCH 27.0 - 33.0 pg 33.3 (H) 33.1 (H) 33.5 (H) 33.9 (H) 33.3 (H)   MCHC 33.6 - 35.0 g/dL 34.9 34.7 35.2 (H) 35.3 (H) 34.9   RDW 35.9 - 50.0 fL 42.9 43.1 43.1 42.1 43.6   Platelet Count 164 - 446 K/uL 336 345 337 356 345     Assessment    Imp:    Visit Diagnosis:    1. Polycythemia vera (HCC)        2. History of sleeve gastrectomy          Plan:  Trial of once a month phlebotomy x 3 - see if sx's improve  RTC after 3 treatments    Bhargav Rosado M.D.

## 2022-12-19 ENCOUNTER — PHARMACY VISIT (OUTPATIENT)
Dept: PHARMACY | Facility: MEDICAL CENTER | Age: 52
End: 2022-12-19
Payer: MEDICARE

## 2023-01-04 ENCOUNTER — HOSPITAL ENCOUNTER (OUTPATIENT)
Dept: LAB | Facility: MEDICAL CENTER | Age: 53
End: 2023-01-04
Attending: INTERNAL MEDICINE
Payer: COMMERCIAL

## 2023-01-04 DIAGNOSIS — D45 POLYCYTHEMIA VERA (HCC): ICD-10-CM

## 2023-01-04 LAB
BASOPHILS # BLD AUTO: 0.8 % (ref 0–1.8)
BASOPHILS # BLD: 0.07 K/UL (ref 0–0.12)
EOSINOPHIL # BLD AUTO: 0.28 K/UL (ref 0–0.51)
EOSINOPHIL NFR BLD: 3.1 % (ref 0–6.9)
ERYTHROCYTE [DISTWIDTH] IN BLOOD BY AUTOMATED COUNT: 43.6 FL (ref 35.9–50)
HCT VFR BLD AUTO: 49.5 % (ref 37–47)
HGB BLD-MCNC: 17.3 G/DL (ref 12–16)
IRON SATN MFR SERPL: 65 % (ref 15–55)
IRON SERPL-MCNC: 168 UG/DL (ref 40–170)
LYMPHOCYTES # BLD AUTO: 3.41 K/UL (ref 1–4.8)
LYMPHOCYTES NFR BLD: 38.3 % (ref 22–41)
MANUAL DIFF BLD: NORMAL
MCH RBC QN AUTO: 33.3 PG (ref 27–33)
MCHC RBC AUTO-ENTMCNC: 34.9 G/DL (ref 33.6–35)
MCV RBC AUTO: 95.2 FL (ref 81.4–97.8)
MONOCYTES # BLD AUTO: 0.42 K/UL (ref 0–0.85)
MONOCYTES NFR BLD AUTO: 4.7 % (ref 0–13.4)
MORPHOLOGY BLD-IMP: NORMAL
NEUTROPHILS # BLD AUTO: 4.73 K/UL (ref 2–7.15)
NEUTROPHILS NFR BLD: 53.1 % (ref 44–72)
NRBC # BLD AUTO: 0 K/UL
NRBC BLD-RTO: 0 /100 WBC
PLATELET # BLD AUTO: 345 K/UL (ref 164–446)
PLATELET BLD QL SMEAR: NORMAL
PMV BLD AUTO: 9.5 FL (ref 9–12.9)
RBC # BLD AUTO: 5.2 M/UL (ref 4.2–5.4)
RBC BLD AUTO: NORMAL
TIBC SERPL-MCNC: 257 UG/DL (ref 250–450)
UIBC SERPL-MCNC: 89 UG/DL (ref 110–370)
VIT B12 SERPL-MCNC: 731 PG/ML (ref 211–911)
WBC # BLD AUTO: 8.9 K/UL (ref 4.8–10.8)

## 2023-01-04 PROCEDURE — 85007 BL SMEAR W/DIFF WBC COUNT: CPT

## 2023-01-04 PROCEDURE — 83540 ASSAY OF IRON: CPT

## 2023-01-04 PROCEDURE — 36415 COLL VENOUS BLD VENIPUNCTURE: CPT

## 2023-01-04 PROCEDURE — 82607 VITAMIN B-12: CPT

## 2023-01-04 PROCEDURE — 85025 COMPLETE CBC W/AUTO DIFF WBC: CPT

## 2023-01-04 PROCEDURE — 83550 IRON BINDING TEST: CPT

## 2023-01-10 ENCOUNTER — HOSPITAL ENCOUNTER (OUTPATIENT)
Dept: HEMATOLOGY ONCOLOGY | Facility: MEDICAL CENTER | Age: 53
End: 2023-01-10
Attending: INTERNAL MEDICINE
Payer: COMMERCIAL

## 2023-01-10 VITALS
SYSTOLIC BLOOD PRESSURE: 116 MMHG | WEIGHT: 162.7 LBS | RESPIRATION RATE: 18 BRPM | OXYGEN SATURATION: 93 % | TEMPERATURE: 96.8 F | BODY MASS INDEX: 28.83 KG/M2 | DIASTOLIC BLOOD PRESSURE: 70 MMHG | HEART RATE: 79 BPM | HEIGHT: 63 IN

## 2023-01-10 DIAGNOSIS — D45 POLYCYTHEMIA VERA (HCC): ICD-10-CM

## 2023-01-10 DIAGNOSIS — Z90.3 HISTORY OF SLEEVE GASTRECTOMY: ICD-10-CM

## 2023-01-10 PROCEDURE — 99212 OFFICE O/P EST SF 10 MIN: CPT | Performed by: INTERNAL MEDICINE

## 2023-01-10 PROCEDURE — 99213 OFFICE O/P EST LOW 20 MIN: CPT | Performed by: INTERNAL MEDICINE

## 2023-01-10 RX ORDER — SODIUM CHLORIDE 9 MG/ML
500 INJECTION, SOLUTION INTRAVENOUS ONCE
Status: CANCELLED | OUTPATIENT
Start: 2023-01-18 | End: 2023-01-18

## 2023-01-10 ASSESSMENT — PAIN SCALES - GENERAL: PAINLEVEL: NO PAIN

## 2023-01-10 ASSESSMENT — FIBROSIS 4 INDEX: FIB4 SCORE: 0.79

## 2023-01-20 ENCOUNTER — OUTPATIENT INFUSION SERVICES (OUTPATIENT)
Dept: ONCOLOGY | Facility: MEDICAL CENTER | Age: 53
End: 2023-01-20
Attending: INTERNAL MEDICINE
Payer: COMMERCIAL

## 2023-01-20 VITALS
SYSTOLIC BLOOD PRESSURE: 128 MMHG | HEART RATE: 102 BPM | HEIGHT: 63 IN | WEIGHT: 162.7 LBS | BODY MASS INDEX: 28.83 KG/M2 | TEMPERATURE: 97.8 F | RESPIRATION RATE: 18 BRPM | DIASTOLIC BLOOD PRESSURE: 96 MMHG | OXYGEN SATURATION: 98 %

## 2023-01-20 DIAGNOSIS — D45 POLYCYTHEMIA VERA (HCC): ICD-10-CM

## 2023-01-20 LAB
HCT VFR BLD AUTO: 43.9 % (ref 37–47)
HGB BLD-MCNC: 15.6 G/DL (ref 12–16)

## 2023-01-20 PROCEDURE — 99195 PHLEBOTOMY: CPT

## 2023-01-20 PROCEDURE — 85018 HEMOGLOBIN: CPT

## 2023-01-20 PROCEDURE — 85014 HEMATOCRIT: CPT

## 2023-01-20 RX ORDER — SODIUM CHLORIDE 9 MG/ML
500 INJECTION, SOLUTION INTRAVENOUS ONCE
Status: CANCELLED | OUTPATIENT
Start: 2023-01-20 | End: 2023-01-20

## 2023-01-20 ASSESSMENT — FIBROSIS 4 INDEX: FIB4 SCORE: 0.79

## 2023-01-21 NOTE — PROGRESS NOTES
Pt arrived ambulatory for first TP. POC discussed and pt verbalized understanding. PIV placed, brisk blood return noted and line flushes with ease; labs drawn at this time.Hgb 15.6 and Hct 43.9. Pt meets parameters for TP. 500 ml whole blood removed and pt tolerated well. No s/s of complications noted. Pt ate a snack and remained seated for 15 mins post-procedure. Orthostatic VS taken, pt remained seated an extra ten mins post VS until VS returned to baseline. Pt denied s/s of syncope, dizziness, chest pain and states she feels well enough to go home. PIV removed, and sterile gauze/coban applied to site. Pt confirmed next appt and discharged home ambulatory in [facility].

## 2023-02-09 PROCEDURE — RXMED WILLOW AMBULATORY MEDICATION CHARGE: Performed by: FAMILY MEDICINE

## 2023-02-09 RX ORDER — ONDANSETRON 8 MG/1
TABLET, ORALLY DISINTEGRATING ORAL
Qty: 15 TABLET | Refills: 3 | Status: SHIPPED | OUTPATIENT
Start: 2023-02-09 | End: 2023-06-13 | Stop reason: SDUPTHER

## 2023-02-09 NOTE — TELEPHONE ENCOUNTER
Received request via: Pharmacy    Was the patient seen in the last year in this department? Yes    Does the patient have an active prescription (recently filled or refills available) for medication(s) requested? No    Does the patient have longterm Plus and need 100 day supply (blood pressure, diabetes and cholesterol meds only)? Patient does not have SCP      Last Office Visit:09/15/2022  Last Labs:01/04/2023

## 2023-02-10 PROCEDURE — RXMED WILLOW AMBULATORY MEDICATION CHARGE: Performed by: FAMILY MEDICINE

## 2023-02-14 ENCOUNTER — DOCUMENTATION (OUTPATIENT)
Dept: PHARMACY | Facility: MEDICAL CENTER | Age: 53
End: 2023-02-14
Payer: COMMERCIAL

## 2023-02-14 NOTE — PROGRESS NOTES
"02/13/23: Spoke with [ERIC]. She is doing well on the [Praluent 150mg/ml]. She reports vomiting  to the Praluent, but MD prescribed odensetron and no new allergies. She reports 0 missed doses and has about 0 on hand, next inj 02/27. Sending delivery for [02/16] via [employer] cold ship. Needs bandaids and alchol swabs. Also sending out medications that were \"ready dispense\"  CHLOTHALIDONE 25MG; LAMOTRIGINE 150MG;  OMEPRAZOLE 40MG; ODANSETRON 8MG; TRAZODONE 50MG; ALBUTEROL 10/MCG/ACT  "

## 2023-02-16 ENCOUNTER — PHARMACY VISIT (OUTPATIENT)
Dept: PHARMACY | Facility: MEDICAL CENTER | Age: 53
End: 2023-02-16
Payer: MEDICARE

## 2023-02-27 ENCOUNTER — OUTPATIENT INFUSION SERVICES (OUTPATIENT)
Dept: ONCOLOGY | Facility: MEDICAL CENTER | Age: 53
End: 2023-02-27
Attending: INTERNAL MEDICINE
Payer: COMMERCIAL

## 2023-02-27 VITALS
BODY MASS INDEX: 29.82 KG/M2 | DIASTOLIC BLOOD PRESSURE: 92 MMHG | RESPIRATION RATE: 18 BRPM | HEIGHT: 62 IN | HEART RATE: 87 BPM | SYSTOLIC BLOOD PRESSURE: 141 MMHG | TEMPERATURE: 97.3 F | OXYGEN SATURATION: 95 % | WEIGHT: 162.04 LBS

## 2023-02-27 DIAGNOSIS — D45 POLYCYTHEMIA VERA (HCC): ICD-10-CM

## 2023-02-27 LAB
HCT VFR BLD AUTO: 43.5 % (ref 37–47)
HGB BLD-MCNC: 15.7 G/DL (ref 12–16)

## 2023-02-27 PROCEDURE — 99195 PHLEBOTOMY: CPT

## 2023-02-27 PROCEDURE — 85014 HEMATOCRIT: CPT

## 2023-02-27 PROCEDURE — 85018 HEMOGLOBIN: CPT

## 2023-02-27 RX ORDER — SODIUM CHLORIDE 9 MG/ML
500 INJECTION, SOLUTION INTRAVENOUS ONCE
Status: CANCELLED | OUTPATIENT
Start: 2023-02-27 | End: 2023-02-27

## 2023-02-27 ASSESSMENT — FIBROSIS 4 INDEX: FIB4 SCORE: 0.79

## 2023-02-28 NOTE — PROGRESS NOTES
Patient presents for therapeutic phlebotomy. Reviewed plan of care patient verbalizes understanding. PIV established flushes well with labs drawn as ordered. Hemoglobin 15.7 and hematocrit 43.5 patient meets parameters for treatment. 500 mL whole blood removed. Patient observed for 15 minutes post phlebotomy. Patient remains stable with stable vital signs. PIV removed tip intact compression dressing to site. Patient scheduled for her next appointment and released in no acute distress

## 2023-03-08 ENCOUNTER — DOCUMENTATION (OUTPATIENT)
Dept: PHARMACY | Facility: MEDICAL CENTER | Age: 53
End: 2023-03-08
Payer: COMMERCIAL

## 2023-03-08 NOTE — PROGRESS NOTES
"Follow Up Assessment   Dx:  Mixed hyperlipidemia       List Changes to Allergies, Diagnoses: none        Current S/Sx: none  Clinically Relevant, Abnormal Labs:  23   - CBC: Hgb: high 17.3, Hct: high 49.5   - Chem7: glucose: high 102 (10/10/22)   - LFTs/Tbili: WNL   - GFR: 99   - Lipids: TC: 195, T, HDL: 46, LDL: 96 (10/10/22)   - A1c: 5.4 (10/10/22)   - BP: 141/92 (23)      Tx prescribed: Praluent 150mg/mL, inject 1 pen SQ every 14 days    - Administration: now injecting into abdomen area, was getting more anxiety with injecting into thigh      Adherence: no missed doses, next dose Dameon 3/12   - Missed dose mgmt:  not pertinent       Current SE: gets nausous and \"fluey\" for about 6hours after taking injection   - Mitigation/Mgmt:   **Patient Intervention**  She takes zofran and that helps with the nausea and she knows it lasts for about 6hours then passes, knows the benefits outweigh the side effect. Reviewed to take zofran 1/2hr before and can take every 8hrs if needed, stay hydrated, and smaller and more frequent meals that day. Suggested trying to have someone else give injections in case she's getting nauseous due to needle fear and she had tried that and has found that injecting herself into stomach area works best. Advised to let us know if nausea worsens or not tolerable so she can try something else.       Wellness/Lifestyle Counseling:    - Support/QOL: doing well overall    - Exercise: walks 6-9 miles a day, lots of cardio    - Diet: says she has a good diet, low carb, 90gms of protein a day because of gastric sleeve, protein shakes    - Smoking: quit in 2015 per EMR    - Immunizations: flu vaccine 22 per EMR       Med Rec/Updated drug list: deferred  DI Check:   no clinically significant DDIs based on meds in EMR        Goals of Therapy:    Achieve goal LDL levels (< mg/dL) to reduce risk of cardiovascular events- last LDL  <100   Implement lifestyle modifications: diet, " exercise, weight loss, and smoking cessation- exercising and conscientious of diet    - Patient has agreed/understands to goals of therapy during education/counseling       Additional:   Had a brief and pleasant conversation with patient for annual check in on her Praluent. She's doing well with injections every other Sunday, no missed doses. She's injecting into her abdomen area now since she was getting more anxious with injecting into her thigh. She does get nausous and feels sick for about 6 hours after injections, takes zofran and that helps, reviewed mitigation strategies. She said she knows the benefits of taking the Praluent outweigh any side effects and has always had a sensitive stomach. She's walking 6-9 miles a day and watching diet with low carb and high protien. No questions at this time. She was appreciative of call.

## 2023-03-10 DIAGNOSIS — R53.82 CHRONIC FATIGUE: ICD-10-CM

## 2023-03-14 PROCEDURE — RXMED WILLOW AMBULATORY MEDICATION CHARGE: Performed by: FAMILY MEDICINE

## 2023-03-15 ENCOUNTER — PHARMACY VISIT (OUTPATIENT)
Dept: PHARMACY | Facility: MEDICAL CENTER | Age: 53
End: 2023-03-15
Payer: MEDICARE

## 2023-03-16 PROCEDURE — RXMED WILLOW AMBULATORY MEDICATION CHARGE: Performed by: FAMILY MEDICINE

## 2023-03-20 ENCOUNTER — HOSPITAL ENCOUNTER (OUTPATIENT)
Dept: LAB | Facility: MEDICAL CENTER | Age: 53
End: 2023-03-20
Attending: FAMILY MEDICINE
Payer: COMMERCIAL

## 2023-03-20 DIAGNOSIS — E78.2 MIXED DYSLIPIDEMIA: ICD-10-CM

## 2023-03-20 DIAGNOSIS — R53.82 CHRONIC FATIGUE: ICD-10-CM

## 2023-03-20 LAB
T4 FREE SERPL-MCNC: 1.3 NG/DL (ref 0.93–1.7)
TSH SERPL DL<=0.005 MIU/L-ACNC: 1.95 UIU/ML (ref 0.38–5.33)

## 2023-03-20 PROCEDURE — 84439 ASSAY OF FREE THYROXINE: CPT

## 2023-03-20 PROCEDURE — 36415 COLL VENOUS BLD VENIPUNCTURE: CPT

## 2023-03-20 PROCEDURE — 82172 ASSAY OF APOLIPOPROTEIN: CPT

## 2023-03-20 PROCEDURE — 84443 ASSAY THYROID STIM HORMONE: CPT

## 2023-03-21 ENCOUNTER — E-CONSULT (OUTPATIENT)
Dept: ENDOCRINOLOGY | Facility: MEDICAL CENTER | Age: 53
End: 2023-03-21

## 2023-03-21 ENCOUNTER — OFFICE VISIT (OUTPATIENT)
Dept: MEDICAL GROUP | Facility: PHYSICIAN GROUP | Age: 53
End: 2023-03-21
Payer: COMMERCIAL

## 2023-03-21 ENCOUNTER — PHARMACY VISIT (OUTPATIENT)
Dept: PHARMACY | Facility: MEDICAL CENTER | Age: 53
End: 2023-03-21
Payer: MEDICARE

## 2023-03-21 VITALS
DIASTOLIC BLOOD PRESSURE: 80 MMHG | OXYGEN SATURATION: 95 % | WEIGHT: 163 LBS | SYSTOLIC BLOOD PRESSURE: 118 MMHG | RESPIRATION RATE: 16 BRPM | BODY MASS INDEX: 27.16 KG/M2 | HEIGHT: 65 IN | HEART RATE: 88 BPM | TEMPERATURE: 97.5 F

## 2023-03-21 DIAGNOSIS — Z23 NEED FOR VACCINATION: ICD-10-CM

## 2023-03-21 DIAGNOSIS — Z71.9 ENCOUNTER FOR CONSULTATION: ICD-10-CM

## 2023-03-21 DIAGNOSIS — F90.2 ATTENTION DEFICIT HYPERACTIVITY DISORDER (ADHD), COMBINED TYPE: ICD-10-CM

## 2023-03-21 DIAGNOSIS — R73.01 ELEVATED FASTING GLUCOSE: ICD-10-CM

## 2023-03-21 DIAGNOSIS — R53.82 CHRONIC FATIGUE: ICD-10-CM

## 2023-03-21 DIAGNOSIS — F31.31 BIPOLAR AFFECTIVE DISORDER, CURRENTLY DEPRESSED, MILD (HCC): ICD-10-CM

## 2023-03-21 DIAGNOSIS — Z11.59 NEED FOR HEPATITIS C SCREENING TEST: ICD-10-CM

## 2023-03-21 PROCEDURE — RXMED WILLOW AMBULATORY MEDICATION CHARGE: Performed by: FAMILY MEDICINE

## 2023-03-21 PROCEDURE — 99214 OFFICE O/P EST MOD 30 MIN: CPT | Mod: 25 | Performed by: FAMILY MEDICINE

## 2023-03-21 PROCEDURE — 99449 NTRPROF PH1/NTRNET/EHR 31/>: CPT | Performed by: INTERNAL MEDICINE

## 2023-03-21 PROCEDURE — 90746 HEPB VACCINE 3 DOSE ADULT IM: CPT | Performed by: FAMILY MEDICINE

## 2023-03-21 PROCEDURE — 90471 IMMUNIZATION ADMIN: CPT | Performed by: FAMILY MEDICINE

## 2023-03-21 ASSESSMENT — FIBROSIS 4 INDEX: FIB4 SCORE: 0.79

## 2023-03-21 NOTE — PATIENT INSTRUCTIONS
Please check [facility] for your referral information or call the referral department at .   You can also send me a [facility] message regarding your referral information.   Please note you will probably not get a call regarding the referral.

## 2023-03-21 NOTE — ASSESSMENT & PLAN NOTE
Chronic fatigue worsening for three years. Normal sleep study. Takes trazodone very rarely only if she cannot sleep. She has polycythemia vera, normal h/h, normal b vitamins. She is on lamotrigine for bipolar disorder for years, is also on Straterra for ADHD.  She does not feel normal with the chronic fatigue.     She notes as soon as she eats she is very fatigued, right after eating, she is noticeably fatigued, noticed by others. Even worse with carbohydrates. I will check an insulin level. She would like to try metformin again as she felt it was helpful in the past. Fasting blood sugar 102, A1c 5.4  Will request edocrinology consult to see if any testing can be done.

## 2023-03-21 NOTE — PROGRESS NOTES
Subjective:   aMrgareth Blake is a 52 y.o. female here today for evaluation and management of:     Chronic fatigue  Chronic fatigue worsening for three years. Normal sleep study. Takes trazodone very rarely only if she cannot sleep. She has polycythemia vera, normal h/h, normal b vitamins. She is on lamotrigine for bipolar disorder for years, is also on Straterra for ADHD.  She does not feel normal with the chronic fatigue.     She notes as soon as she eats she is very fatigued, right after eating, she is noticeably fatigued, noticed by others. Even worse with carbohydrates. I will check an insulin level. She would like to try metformin again as she felt it was helpful in the past. Fasting blood sugar 102, A1c 5.4  Will request edocrinology consult to see if any testing can be done.            Attention deficit hyperactivity disorder (ADHD), combined type  Doing well on straterra 18 mg and even her boss at work notes good improvement.   Without it she has inability to concentration, difficulty with staying still, always moving.     Also on lamictal for bipolar disorder.     New referral to psychiatry provided for medication management.              Current medicines (including changes today)  Current Outpatient Medications   Medication Sig Dispense Refill    Zoster Vac Recomb Adjuvanted (SHINGRIX) 50 MCG/0.5ML Recon Susp Inject 0.5 mL into the shoulder, thigh, or buttocks one time for 1 dose. 0.5 mL 0    metFORMIN (GLUCOPHAGE) 500 MG Tab Take 1 Tablet by mouth 2 times a day with meals. 60 Tablet 3    ondansetron (ZOFRAN ODT) 8 MG TABLET DISPERSIBLE DISSOLVE 1 TABLET IN MOUTH EVERY 12 HOURS AS NEEDED FOR NAUSEA FOR VOMITING 15 Tablet 3    traZODone (DESYREL) 50 MG Tab Take 1 Tablet by mouth every evening. (Patient taking differently: Take 50 mg by mouth as needed.) 90 Tablet 3    omeprazole (PRILOSEC) 40 MG delayed-release capsule Take 1 Capsule by mouth every day. 90 Capsule 3    chlorthalidone (HYGROTON) 25  "MG Tab Take 1 Tablet by mouth every day. 90 Tablet 3    albuterol 108 (90 Base) MCG/ACT Aero Soln inhalation aerosol Inhale 2 Puffs every 6 hours as needed for Shortness of Breath. 8.5 g 5    Alirocumab (PRALUENT) 150 MG/ML Solution Auto-injector Inject 150 mg under the skin every 14 days. 6 mL 3    lamotrigine (LAMICTAL) 150 MG tablet Take 1 Tablet by mouth every day. 90 Tablet 3    atomoxetine (STRATTERA) 18 MG capsule Take 1 Capsule by mouth every morning. 90 Capsule 3     No current facility-administered medications for this visit.     She  has a past medical history of Asthma, Bipolar 1 disorder, depressed (HCC), Chickenpox, Constipation, Coronary heart disease, Daytime sleepiness, Frequent headaches, Frequent urination, GERD (gastroesophageal reflux disease), [place] measles, Heart burn, Heartburn, High cholesterol (04/08/2021), Hypertension (04/08/2021), Hypothyroidism, Insomnia, Morning headache, Painful joint, Tonsillitis, Toothache, Weakness, Weight loss, and Whooping cough.    ROS  No chest pain, no shortness of breath, no abdominal pain       Objective:     /80 (BP Location: Left arm, Patient Position: Sitting, BP Cuff Size: Adult)   Pulse 88   Temp 36.4 °C (97.5 °F) (Temporal)   Resp 16   Ht 1.657 m (5' 5.25\")   Wt 73.9 kg (163 lb)   SpO2 95%  Body mass index is 26.92 kg/m².   Physical Exam:  Constitutional: Alert, no distress.  Skin: Warm, dry, good turgor, no rashes in visible areas.  Eye: Equal, round and reactive, conjunctiva clear, lids normal.  ENMT: Lips without lesions, good dentition, oropharynx clear.  Neck: Trachea midline, no masses, no thyromegaly. No cervical or supraclavicular lymphadenopathy  Respiratory: Unlabored respiratory effort, lungs clear to auscultation, no wheezes, no ronchi.  Cardiovascular: Normal S1, S2, no murmur, no edema.  Abdomen: Soft, non-tender, no masses, no hepatosplenomegaly.  Psych: Alert and oriented x3, normal affect and mood.        Assessment and " Plan:   The following treatment plan was discussed    1. Need for hepatitis C screening test  - HCV Scrn ( [id number] 1xLife); Future    2. Need for vaccination  - Hepatitis B Vaccine Adult 20+  - Zoster Vac Recomb Adjuvanted (SHINGRIX) 50 MCG/0.5ML Recon Susp; Inject 0.5 mL into the shoulder, thigh, or buttocks one time for 1 dose.  Dispense: 0.5 mL; Refill: 0    3. Chronic fatigue  - E-consult to Endocrinology    4. Elevated fasting glucose  - INSULIN FASTING; Future  - E-consult to Endocrinology    5. Bipolar affective disorder, currently depressed, mild (HCC)  - Referral to Psychiatry    6. Attention deficit hyperactivity disorder (ADHD), combined type  - Referral to Psychiatry    Other orders  - metFORMIN (GLUCOPHAGE) 500 MG Tab; Take 1 Tablet by mouth 2 times a day with meals.  Dispense: 60 Tablet; Refill: 3      Followup: No follow-ups on file.

## 2023-03-21 NOTE — ASSESSMENT & PLAN NOTE
Doing well on straterra 18 mg and even her boss at work notes good improvement.   Without it she has inability to concentration, difficulty with staying still, always moving.     Also on lamictal for bipolar disorder.     New referral to psychiatry provided for medication management.

## 2023-03-22 LAB — APO B100 SERPL-MCNC: 105 MG/DL (ref 55–125)

## 2023-03-22 NOTE — PROGRESS NOTES
E-Consult Response     After careful review of the patient's information available in the medical record, the following are my findings and recommendations:    Reason for consult: Recommendations for patient with postprandial fatigue    Summary of data reviewed: In summary this is a 52-year-old female born on October 23, 1970 with history of postprandial fatigue according to primary care.    She has a history of bipolar disorder, anxiety, GERD, and is status post bariatric surgery with sleeve gastrectomy    According to provider patient has chronic fatigue markedly so especially after meals, worse after meals with carbs.    Her provider recently started her back on metformin      Her thyroid function has been normal on testing TSH was 1.9 on March 2023    Her provider has already ordered a fasting insulin level      Recommendations: I would add a CMP to the fasting insulin level as well as proinsulin and beta hydroxybutyric acid level as the insulin level alone cannot be properly interpreted without a plasma glucose to aid the analysis      Checking a morning ACTH and cortisol level and 21-hydroxylase antibodies may also help rule out [place]'s disease although the degree of suspicion is very low based on the history of prior bariatric surgery    But it sounds like the patient may have some form of early dumping syndrome which occurs frequently in bariatric surgery patients although it is more common with gastric bypass it can also occur with gastric sleeve    The primary treatment for this would be dietary modification and avoidance of simple sugars and eating more high-fiber and complex carbohydrates and high protein foods and eating small frequent meals and  solids from liquids by 30 minutes          E-Consult Time: 31 minutes were spent with >50% of the total time spent discussing plan of care with requesting physician (Use code [id number])    Darrell Kilgore M.D.

## 2023-04-06 ENCOUNTER — OUTPATIENT INFUSION SERVICES (OUTPATIENT)
Dept: ONCOLOGY | Facility: MEDICAL CENTER | Age: 53
End: 2023-04-06
Attending: INTERNAL MEDICINE
Payer: COMMERCIAL

## 2023-04-06 VITALS
HEIGHT: 62 IN | RESPIRATION RATE: 16 BRPM | HEART RATE: 96 BPM | DIASTOLIC BLOOD PRESSURE: 85 MMHG | WEIGHT: 164.46 LBS | SYSTOLIC BLOOD PRESSURE: 113 MMHG | OXYGEN SATURATION: 97 % | BODY MASS INDEX: 30.26 KG/M2 | TEMPERATURE: 97.3 F

## 2023-04-06 DIAGNOSIS — D45 POLYCYTHEMIA VERA (HCC): ICD-10-CM

## 2023-04-06 LAB
HCT VFR BLD AUTO: 42.8 % (ref 37–47)
HGB BLD-MCNC: 15.3 G/DL (ref 12–16)

## 2023-04-06 PROCEDURE — 85014 HEMATOCRIT: CPT

## 2023-04-06 PROCEDURE — 85018 HEMOGLOBIN: CPT

## 2023-04-06 PROCEDURE — 99195 PHLEBOTOMY: CPT

## 2023-04-06 RX ORDER — SODIUM CHLORIDE 9 MG/ML
500 INJECTION, SOLUTION INTRAVENOUS ONCE
Status: CANCELLED | OUTPATIENT
Start: 2023-04-06 | End: 2023-04-06

## 2023-04-06 ASSESSMENT — FIBROSIS 4 INDEX: FIB4 SCORE: 0.79

## 2023-04-10 NOTE — PROGRESS NOTES
This evaluation was conducted via Zoom using secure and encrypted videoconferencing technology. The patient was in her car in the state of [place].    The patient's identity was confirmed and verbal consent was obtained for this virtual visit.      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 Has been on monthly phlebotomies x 3. Last one on 4/6/23. She was due for a visit today but missed it so we converted it to a Virtual. Still fatigued but otherwise feels well.     ROS:    Constitutional: No weight loss  Skin: No rash or jaundice  HENT: No change in eyesight or hearing  Cardiovascular:No chest pain or arrythmia  Respiratory:No cough or SOB  GI:No nausea, vomiting, diarrhea, constipation  :No dysuria or frequency  Musculoskeletal:No bone or joint pain  Neuro:No sx's of neuropathy  Psych: No complaints    PMH:      Allergies   Allergen Reactions    Atorvastatin Myalgia    Dust Mite Extract Unspecified     Other reaction(s): Unknown  Other reaction(s): Unknown    Molds & Smuts Unspecified    Penicillins Hives and Unspecified     rash       Past Medical History:   Diagnosis Date    Asthma     Bipolar 1 disorder, depressed (HCC)     Chickenpox     Constipation     Coronary heart disease     Daytime sleepiness     Frequent headaches     Frequent urination     GERD (gastroesophageal reflux disease)     [place] measles     Heart burn     Heartburn     High cholesterol 04/08/2021    Hypertension 04/08/2021    Hypothyroidism     Insomnia     Morning headache     Painful joint     Tonsillitis     Toothache     Weakness     Weight loss     Whooping cough         Past Surgical History:   Procedure Laterality Date    SD LAP, CORINNE RESTRICT PROC, LONGITUDINAL GAS*  04/16/2021    Procedure: GASTRECTOMY, SLEEVE, LAPAROSCOPIC;  Surgeon: Houston Pickett M.D.;  Location: SURGERY [facility];  Service: General    SD  UNLISTED LAPAROSCOPIC PROC, LIVER  2021    Procedure: BIOPSY, LIVER, LAPAROSCOPIC.;  Surgeon: Houston Pickett M.D.;  Location: SURGERY [facility];  Service: General    HYSTERECTOMY LAPAROSCOPY      HYSTERECTOMY, TOTAL ABDOMINAL      SLEEVE,SOPHIE VASO THIGH          Medications:    Current Outpatient Medications on File Prior to Visit   Medication Sig Dispense Refill    metFORMIN (GLUCOPHAGE) 500 MG Tab Take 1 tablet by mouth 2 times a day with meals. 60 Tablet 3    ondansetron (ZOFRAN ODT) 8 MG TABLET DISPERSIBLE DISSOLVE 1 TABLET IN MOUTH EVERY 12 HOURS AS NEEDED FOR NAUSEA FOR VOMITING 15 Tablet 3    traZODone (DESYREL) 50 MG Tab Take 1 Tablet by mouth every evening. (Patient taking differently: Take 50 mg by mouth as needed.) 90 Tablet 3    omeprazole (PRILOSEC) 40 MG delayed-release capsule Take 1 Capsule by mouth every day. 90 Capsule 3    chlorthalidone (HYGROTON) 25 MG Tab Take 1 Tablet by mouth every day. 90 Tablet 3    albuterol 108 (90 Base) MCG/ACT Aero Soln inhalation aerosol Inhale 2 Puffs every 6 hours as needed for Shortness of Breath. 8.5 g 5    Alirocumab (PRALUENT) 150 MG/ML Solution Auto-injector Inject 150 mg under the skin every 14 days. 6 mL 3    lamotrigine (LAMICTAL) 150 MG tablet Take 1 Tablet by mouth every day. 90 Tablet 3    atomoxetine (STRATTERA) 18 MG capsule Take 1 Capsule by mouth every morning. 90 Capsule 3     No current facility-administered medications on file prior to visit.       Social History     Tobacco Use    Smoking status: Former     Packs/day: 0.00     Years: 25.00     Pack years: 0.00     Types: Cigarettes     Start date: 1990     Quit date: 2015     Years since quittin.2    Smokeless tobacco: Never    Tobacco comments:     No tobacco 6 years   Substance Use Topics    Alcohol use: No        No family history on file.     Objective    Vitals:    There were no vitals taken for this visit.    Physical Exam:    Appears well-developed and well-nourished. No  distress.    Head -  Normocephalic .   Eyes - Pupils are equal. Conjunctivae normal. No scleral icterus.   Ears - normal hearing  Neurological -   Alert and oriented.  Skin -  No rash noted. Not diaphoretic. No erythema. No pallor. No jaundice   Psychiatric -  Normal mood and affect.    Labs:     Latest Reference Range & Units 10/10/22 07:55 01/04/23 07:27 01/20/23 16:40 02/27/23 15:25 04/06/23 15:00   Hemoglobin 12.0 - 16.0 g/dL 17.2 (H) 17.3 (H) 15.6 15.7 15.3   Hematocrit 37.0 - 47.0 % 48.7 (H) 49.5 (H) 43.9 43.5 42.8      Latest Reference Range & Units 06/04/22 07:43 10/10/22 07:55 01/04/23 07:27   Iron 40 - 170 ug/dL 114 168 168   Total Iron Binding 250 - 450 ug/dL 241 (L) 239 (L) 257   % Saturation 15 - 55 % 47 70 (H) 65 (H)     Assessment    Imp:    Visit Diagnosis:    1. Polycythemia vera (HCC)        2. History of sleeve gastrectomy          Plan:  Change to q. 2 months after next phlebotomy  Recheck after 3 more.    Bhargav Rosado M.D.

## 2023-04-17 ENCOUNTER — HOSPITAL ENCOUNTER (OUTPATIENT)
Dept: LAB | Facility: MEDICAL CENTER | Age: 53
End: 2023-04-17
Attending: INTERNAL MEDICINE
Payer: COMMERCIAL

## 2023-04-17 DIAGNOSIS — D45 POLYCYTHEMIA VERA (HCC): ICD-10-CM

## 2023-04-17 LAB
BASOPHILS # BLD AUTO: 0.8 % (ref 0–1.8)
BASOPHILS # BLD: 0.05 K/UL (ref 0–0.12)
EOSINOPHIL # BLD AUTO: 0.25 K/UL (ref 0–0.51)
EOSINOPHIL NFR BLD: 4.1 % (ref 0–6.9)
ERYTHROCYTE [DISTWIDTH] IN BLOOD BY AUTOMATED COUNT: 43.9 FL (ref 35.9–50)
HCT VFR BLD AUTO: 46.3 % (ref 37–47)
HGB BLD-MCNC: 15.4 G/DL (ref 12–16)
IMM GRANULOCYTES # BLD AUTO: 0.01 K/UL (ref 0–0.11)
IMM GRANULOCYTES NFR BLD AUTO: 0.2 % (ref 0–0.9)
LYMPHOCYTES # BLD AUTO: 2.54 K/UL (ref 1–4.8)
LYMPHOCYTES NFR BLD: 41.8 % (ref 22–41)
MCH RBC QN AUTO: 32.6 PG (ref 27–33)
MCHC RBC AUTO-ENTMCNC: 33.3 G/DL (ref 33.6–35)
MCV RBC AUTO: 98.1 FL (ref 81.4–97.8)
MONOCYTES # BLD AUTO: 0.55 K/UL (ref 0–0.85)
MONOCYTES NFR BLD AUTO: 9 % (ref 0–13.4)
NEUTROPHILS # BLD AUTO: 2.68 K/UL (ref 2–7.15)
NEUTROPHILS NFR BLD: 44.1 % (ref 44–72)
NRBC # BLD AUTO: 0 K/UL
NRBC BLD-RTO: 0 /100 WBC
PLATELET # BLD AUTO: 413 K/UL (ref 164–446)
PMV BLD AUTO: 9.6 FL (ref 9–12.9)
RBC # BLD AUTO: 4.72 M/UL (ref 4.2–5.4)
WBC # BLD AUTO: 6.1 K/UL (ref 4.8–10.8)

## 2023-04-17 PROCEDURE — 36415 COLL VENOUS BLD VENIPUNCTURE: CPT

## 2023-04-17 PROCEDURE — 85025 COMPLETE CBC W/AUTO DIFF WBC: CPT

## 2023-04-18 ENCOUNTER — HOSPITAL ENCOUNTER (OUTPATIENT)
Dept: HEMATOLOGY ONCOLOGY | Facility: MEDICAL CENTER | Age: 53
End: 2023-04-18
Attending: INTERNAL MEDICINE
Payer: COMMERCIAL

## 2023-04-18 VITALS — BODY MASS INDEX: 29.08 KG/M2 | HEIGHT: 62 IN | WEIGHT: 158 LBS

## 2023-04-18 DIAGNOSIS — D45 POLYCYTHEMIA VERA (HCC): ICD-10-CM

## 2023-04-18 DIAGNOSIS — Z90.3 HISTORY OF SLEEVE GASTRECTOMY: ICD-10-CM

## 2023-04-18 PROCEDURE — 99213 OFFICE O/P EST LOW 20 MIN: CPT | Mod: 95 | Performed by: INTERNAL MEDICINE

## 2023-04-18 ASSESSMENT — FIBROSIS 4 INDEX: FIB4 SCORE: 0.66

## 2023-05-04 ENCOUNTER — APPOINTMENT (OUTPATIENT)
Dept: ONCOLOGY | Facility: MEDICAL CENTER | Age: 53
End: 2023-05-04
Attending: INTERNAL MEDICINE
Payer: COMMERCIAL

## 2023-05-08 DIAGNOSIS — E78.2 MIXED HYPERLIPIDEMIA: ICD-10-CM

## 2023-05-08 PROCEDURE — RXMED WILLOW AMBULATORY MEDICATION CHARGE: Performed by: FAMILY MEDICINE

## 2023-05-09 RX ORDER — ALIROCUMAB 150 MG/ML
150 INJECTION, SOLUTION SUBCUTANEOUS
Qty: 6 ML | Refills: 3 | Status: SHIPPED | OUTPATIENT
Start: 2023-05-09 | End: 2023-08-02

## 2023-05-09 NOTE — TELEPHONE ENCOUNTER
Received request via: Patient    Was the patient seen in the last year in this department? Yes    Does the patient have an active prescription (recently filled or refills available) for medication(s) requested? No    Does the patient have [facility] and need 100 day supply (blood pressure, diabetes and cholesterol meds only)? Patient does not have SCP      Last office Visit:03/21/2023  Last Labs:04/17/2023

## 2023-05-12 ENCOUNTER — DOCUMENTATION (OUTPATIENT)
Dept: PHARMACY | Facility: MEDICAL CENTER | Age: 53
End: 2023-05-12

## 2023-05-12 PROCEDURE — RXMED WILLOW AMBULATORY MEDICATION CHARGE: Performed by: NURSE PRACTITIONER

## 2023-05-12 PROCEDURE — RXMED WILLOW AMBULATORY MEDICATION CHARGE: Performed by: FAMILY MEDICINE

## 2023-05-12 RX ORDER — ATOMOXETINE 18 MG/1
18 CAPSULE ORAL EVERY MORNING
Qty: 90 CAPSULE | Refills: 3 | Status: CANCELLED | OUTPATIENT
Start: 2023-05-12

## 2023-05-12 NOTE — PROGRESS NOTES
"05/12/23: Spoke with [ERIC]. She is doing well on the [Praluent 150mg/ml]. She reports vomiting  to the Praluent, but MD prescribed ondansetron and no new allergies. Patient stated MD is aware of her side effects. She reports 0 missed doses and has about 0 on hand, next inj 05/22. Sending delivery for [05/16] via [employer] cold ship.  Also sending out medications that were \"ready dispense\" along with new medication Metformin  "

## 2023-05-15 RX ORDER — ATOMOXETINE 18 MG/1
18 CAPSULE ORAL EVERY MORNING
Qty: 90 CAPSULE | Refills: 3 | Status: SHIPPED | OUTPATIENT
Start: 2023-05-15

## 2023-05-15 NOTE — TELEPHONE ENCOUNTER
Received request via: Pharmacy    Was the patient seen in the last year in this department? Yes    Does the patient have an active prescription (recently filled or refills available) for medication(s) requested? No    Does the patient have [facility] and need 100 day supply (blood pressure, diabetes and cholesterol meds only)? Patient does not have SCP    Last Office Visit:03/21/2023  Last Labs:04/17/2023

## 2023-05-16 ENCOUNTER — PHARMACY VISIT (OUTPATIENT)
Dept: PHARMACY | Facility: MEDICAL CENTER | Age: 53
End: 2023-05-16
Payer: MEDICARE

## 2023-05-17 ENCOUNTER — TELEPHONE (OUTPATIENT)
Dept: URGENT CARE | Facility: PHYSICIAN GROUP | Age: 53
End: 2023-05-17
Payer: COMMERCIAL

## 2023-05-17 NOTE — TELEPHONE ENCOUNTER
DOCUMENTATION OF PAR STATUS:      ERIC ALVARADOMARY Hollins: [id number] - PA Case ID: [id number]? Call us at [phone]  Status  Sent to [facility]  05/17/2023    Drug  Strattera 18MG capsules  Form  [employer] Electronic PA Form (2017 NCPDP)

## 2023-05-19 ENCOUNTER — OUTPATIENT INFUSION SERVICES (OUTPATIENT)
Dept: ONCOLOGY | Facility: MEDICAL CENTER | Age: 53
End: 2023-05-19
Attending: INTERNAL MEDICINE
Payer: COMMERCIAL

## 2023-05-19 VITALS
OXYGEN SATURATION: 97 % | BODY MASS INDEX: 29.78 KG/M2 | WEIGHT: 161.82 LBS | TEMPERATURE: 98.1 F | RESPIRATION RATE: 16 BRPM | DIASTOLIC BLOOD PRESSURE: 82 MMHG | HEART RATE: 79 BPM | SYSTOLIC BLOOD PRESSURE: 117 MMHG | HEIGHT: 62 IN

## 2023-05-19 DIAGNOSIS — D45 POLYCYTHEMIA VERA (HCC): ICD-10-CM

## 2023-05-19 LAB
HCT VFR BLD AUTO: 44.4 % (ref 37–47)
HGB BLD-MCNC: 15.6 G/DL (ref 12–16)

## 2023-05-19 PROCEDURE — 85014 HEMATOCRIT: CPT

## 2023-05-19 PROCEDURE — 99195 PHLEBOTOMY: CPT

## 2023-05-19 PROCEDURE — 85018 HEMOGLOBIN: CPT

## 2023-05-19 RX ORDER — SODIUM CHLORIDE 9 MG/ML
500 INJECTION, SOLUTION INTRAVENOUS ONCE
Status: CANCELLED | OUTPATIENT
Start: 2023-05-19 | End: 2023-05-19

## 2023-05-19 ASSESSMENT — FIBROSIS 4 INDEX: FIB4 SCORE: 0.66

## 2023-05-20 NOTE — PROGRESS NOTES
Margareth arrives for the therapeutic phlebotomy. Labs drawn as ordered by MD.  Margareth's Hgb 15.6 , Hct 44.4 %. 500 cc blood removed. Margareth tolerated well. Orthostatic vitals stable, see flowsheet. Margareth denies chest pain, shortness of breath, dizziness, or lightheadedness after treatment. Next appointment scheduled, Margareth discharged home to self care with friend.

## 2023-06-13 PROCEDURE — RXMED WILLOW AMBULATORY MEDICATION CHARGE: Performed by: FAMILY MEDICINE

## 2023-06-13 NOTE — TELEPHONE ENCOUNTER
Received request via: Pharmacy    Was the patient seen in the last year in this department? Yes    Does the patient have an active prescription (recently filled or refills available) for medication(s) requested? No    Does the patient have [facility] and need 100 day supply (blood pressure, diabetes and cholesterol meds only)? Patient does not have SCP      Last Office Visit:03/21/2023  Last Labs:04/17/2023

## 2023-06-20 RX ORDER — ONDANSETRON 8 MG/1
TABLET, ORALLY DISINTEGRATING ORAL
Qty: 15 TABLET | Refills: 3 | Status: SHIPPED | OUTPATIENT
Start: 2023-06-20 | End: 2023-10-29 | Stop reason: SDUPTHER

## 2023-06-23 PROCEDURE — RXMED WILLOW AMBULATORY MEDICATION CHARGE: Performed by: NURSE PRACTITIONER

## 2023-06-26 ENCOUNTER — HOSPITAL ENCOUNTER (OUTPATIENT)
Dept: LAB | Facility: MEDICAL CENTER | Age: 53
End: 2023-06-26
Attending: FAMILY MEDICINE
Payer: COMMERCIAL

## 2023-06-26 DIAGNOSIS — R73.01 ELEVATED FASTING GLUCOSE: ICD-10-CM

## 2023-06-26 PROCEDURE — 83525 ASSAY OF INSULIN: CPT

## 2023-06-28 ENCOUNTER — PHARMACY VISIT (OUTPATIENT)
Dept: PHARMACY | Facility: MEDICAL CENTER | Age: 53
End: 2023-06-28
Payer: MEDICARE

## 2023-06-28 LAB — INSULIN P FAST SERPL-ACNC: 7 UIU/ML (ref 3–25)

## 2023-06-29 ENCOUNTER — OFFICE VISIT (OUTPATIENT)
Dept: MEDICAL GROUP | Facility: PHYSICIAN GROUP | Age: 53
End: 2023-06-29
Payer: COMMERCIAL

## 2023-06-29 VITALS
HEART RATE: 77 BPM | HEIGHT: 63 IN | DIASTOLIC BLOOD PRESSURE: 78 MMHG | OXYGEN SATURATION: 72 % | BODY MASS INDEX: 27.46 KG/M2 | WEIGHT: 155 LBS | SYSTOLIC BLOOD PRESSURE: 102 MMHG | RESPIRATION RATE: 14 BRPM

## 2023-06-29 DIAGNOSIS — F31.31 BIPOLAR AFFECTIVE DISORDER, CURRENTLY DEPRESSED, MILD (HCC): ICD-10-CM

## 2023-06-29 DIAGNOSIS — M25.561 CHRONIC PAIN OF RIGHT KNEE: ICD-10-CM

## 2023-06-29 DIAGNOSIS — M21.612 BUNION, LEFT FOOT: ICD-10-CM

## 2023-06-29 DIAGNOSIS — L98.9 SKIN LESIONS: ICD-10-CM

## 2023-06-29 DIAGNOSIS — Z12.31 ENCOUNTER FOR SCREENING MAMMOGRAM FOR BREAST CANCER: ICD-10-CM

## 2023-06-29 DIAGNOSIS — G89.29 CHRONIC PAIN OF RIGHT KNEE: ICD-10-CM

## 2023-06-29 PROBLEM — E66.9 OBESITY (BMI 30-39.9): Status: RESOLVED | Noted: 2021-04-28 | Resolved: 2023-06-29

## 2023-06-29 PROCEDURE — 99214 OFFICE O/P EST MOD 30 MIN: CPT | Performed by: FAMILY MEDICINE

## 2023-06-29 PROCEDURE — 3074F SYST BP LT 130 MM HG: CPT | Performed by: FAMILY MEDICINE

## 2023-06-29 PROCEDURE — 3078F DIAST BP <80 MM HG: CPT | Performed by: FAMILY MEDICINE

## 2023-06-29 RX ORDER — LAMOTRIGINE 150 MG/1
150 TABLET ORAL DAILY
Qty: 90 TABLET | Refills: 3 | Status: SHIPPED | OUTPATIENT
Start: 2023-06-29

## 2023-06-29 ASSESSMENT — FIBROSIS 4 INDEX: FIB4 SCORE: 0.66

## 2023-06-29 NOTE — PROGRESS NOTES
Subjective:   Margareth Blake is a 52 y.o. female here today for evaluation and management of:     Bunion, left foot  Chronic condition, worsening pain, shooting with pressure to the bunion. She wears steel toed shoes for work, walks 12-14 hrs a day at work.   Ref to podiatry provided for evaluation, surgical correction.       Bipolar affective disorder, currently depressed, mild (HCC)  Chronic condition,   She has been on lamictal and stable on it for last 3 yrs.   Refill provided.   She is on straterra also for ADHD  Ref information for psychiatry for medications provided.     Chronic pain of right knee  Chronic right knee pain, used to be due to morbid obesity, but she has since lost weight with bariatric surgery.   Is on her feet a lot at work, knee with swell and be painful especially with walking down stairs.   Check xray, knee exercises, ice, tylenol arthritis as she avoids NSAIds due to bariatric surgery.              Current medicines (including changes today)  Current Outpatient Medications   Medication Sig Dispense Refill    lamotrigine (LAMICTAL) 150 MG tablet Take 1 Tablet by mouth every day. 90 Tablet 3    ondansetron (ZOFRAN ODT) 8 MG TABLET DISPERSIBLE DISSOLVE 1 TABLET IN MOUTH EVERY 12 HOURS AS NEEDED FOR NAUSEA FOR VOMITING 15 Tablet 3    atomoxetine (STRATTERA) 18 MG capsule Take 1 capsule by mouth every morning. 90 Capsule 3    Alirocumab (PRALUENT) 150 MG/ML Solution Auto-injector Inject 150 mg under the skin every 14 days. 6 mL 3    metFORMIN (GLUCOPHAGE) 500 MG Tab Take 1 tablet by mouth 2 times a day with meals. 60 Tablet 3    traZODone (DESYREL) 50 MG Tab Take 1 Tablet by mouth every evening. (Patient taking differently: Take 50 mg by mouth as needed.) 90 Tablet 3    omeprazole (PRILOSEC) 40 MG delayed-release capsule Take 1 Capsule by mouth every day. 90 Capsule 3    chlorthalidone (HYGROTON) 25 MG Tab Take 1 Tablet by mouth every day. 90 Tablet 3    albuterol 108 (90 Base) MCG/ACT  "Aero Soln inhalation aerosol Inhale 2 Puffs every 6 hours as needed for Shortness of Breath. 8.5 g 5     No current facility-administered medications for this visit.     She  has a past medical history of Asthma, Bipolar 1 disorder, depressed (HCC), Chickenpox, Constipation, Coronary heart disease, Daytime sleepiness, Frequent headaches, Frequent urination, GERD (gastroesophageal reflux disease), [place] measles, Heart burn, Heartburn, High cholesterol (04/08/2021), Hypertension (04/08/2021), Hypothyroidism, Insomnia, Morning headache, Painful joint, Tonsillitis, Toothache, Weakness, Weight loss, and Whooping cough.    ROS  No chest pain, no shortness of breath, no abdominal pain       Objective:     /78 (BP Location: Left arm, Patient Position: Sitting, BP Cuff Size: Adult)   Pulse 77   Resp 14   Ht 1.588 m (5' 2.5\")   Wt 70.3 kg (155 lb)   SpO2 (!) 72%  Body mass index is 27.9 kg/m².   Physical Exam:  Constitutional: Alert, no distress.  Skin: Warm, dry, good turgor, no rashes in visible areas.  Eye: Equal, round and reactive, conjunctiva clear, lids normal.  ENMT: Lips without lesions, good dentition, oropharynx clear.  Neck: Trachea midline, no masses, no thyromegaly. No cervical or supraclavicular lymphadenopathy  Respiratory: Unlabored respiratory effort, lungs clear to auscultation, no wheezes, no ronchi.  Cardiovascular: Normal S1, S2, no murmur, no edema.  Abdomen: Soft, non-tender, no masses, no hepatosplenomegaly.  Psych: Alert and oriented x3, normal affect and mood.  Right knee: negative drawer, positive dipti, positive crepitus, mild swelling      Assessment and Plan:   The following treatment plan was discussed    1. Bunion, left foot  - Referral to Podiatry  - DX-FOOT-2- LEFT; Future    2. Encounter for screening mammogram for breast cancer  - MA-SCREENING MAMMO BILAT W/CAD; Future    3. Skin lesions  - Referral to Dermatology    4. Bipolar affective disorder, currently depressed, mild " (HCC)    5. Chronic pain of right knee  - DX-KNEE 2- RIGHT; Future  - Referral to Physical Therapy    Other orders  - lamotrigine (LAMICTAL) 150 MG tablet; Take 1 Tablet by mouth every day.  Dispense: 90 Tablet; Refill: 3      Followup: Return for As Scheduled.

## 2023-06-29 NOTE — ASSESSMENT & PLAN NOTE
Chronic right knee pain, used to be due to morbid obesity, but she has since lost weight with bariatric surgery.   Is on her feet a lot at work, knee with swell and be painful especially with walking down stairs.   Check xray, knee exercises, ice, tylenol arthritis as she avoids NSAIds due to bariatric surgery.

## 2023-06-29 NOTE — PATIENT INSTRUCTIONS
For psychiatry referral for medications:  [address]  [place] [address]  [facility] [address]  Phone: [phone]

## 2023-06-29 NOTE — ASSESSMENT & PLAN NOTE
Chronic condition, worsening pain, shooting with pressure to the bunion. She wears steel toed shoes for work, walks 12-14 hrs a day at work.   Ref to podiatry provided for evaluation, surgical correction.

## 2023-06-29 NOTE — ASSESSMENT & PLAN NOTE
Chronic condition,   She has been on lamictal and stable on it for last 3 yrs.   Refill provided.   She is on straterra also for ADHD  Ref information for psychiatry for medications provided.

## 2023-07-14 ENCOUNTER — APPOINTMENT (OUTPATIENT)
Dept: RADIOLOGY | Facility: MEDICAL CENTER | Age: 53
End: 2023-07-14
Attending: FAMILY MEDICINE
Payer: COMMERCIAL

## 2023-07-14 ENCOUNTER — OUTPATIENT INFUSION SERVICES (OUTPATIENT)
Dept: ONCOLOGY | Facility: MEDICAL CENTER | Age: 53
End: 2023-07-14
Attending: INTERNAL MEDICINE
Payer: COMMERCIAL

## 2023-07-14 VITALS
BODY MASS INDEX: 29.13 KG/M2 | TEMPERATURE: 98.5 F | SYSTOLIC BLOOD PRESSURE: 132 MMHG | HEART RATE: 82 BPM | WEIGHT: 161.82 LBS | OXYGEN SATURATION: 97 % | RESPIRATION RATE: 18 BRPM | DIASTOLIC BLOOD PRESSURE: 92 MMHG

## 2023-07-14 DIAGNOSIS — D45 POLYCYTHEMIA VERA (HCC): ICD-10-CM

## 2023-07-14 DIAGNOSIS — Z12.31 ENCOUNTER FOR SCREENING MAMMOGRAM FOR BREAST CANCER: ICD-10-CM

## 2023-07-14 LAB
HCT VFR BLD AUTO: 46.5 % (ref 37–47)
HGB BLD-MCNC: 16.1 G/DL (ref 12–16)

## 2023-07-14 PROCEDURE — 85018 HEMOGLOBIN: CPT

## 2023-07-14 PROCEDURE — 85014 HEMATOCRIT: CPT

## 2023-07-14 PROCEDURE — 77063 BREAST TOMOSYNTHESIS BI: CPT

## 2023-07-14 PROCEDURE — 99195 PHLEBOTOMY: CPT

## 2023-07-14 RX ORDER — SODIUM CHLORIDE 9 MG/ML
500 INJECTION, SOLUTION INTRAVENOUS ONCE
Status: CANCELLED | OUTPATIENT
Start: 2023-07-14 | End: 2023-07-14

## 2023-07-14 ASSESSMENT — FIBROSIS 4 INDEX: FIB4 SCORE: 0.66

## 2023-07-15 NOTE — PROGRESS NOTES
Pt arrives for therapeutic phlebotomy for polycythemia vera.  Discussed plan of care with pt.  PIV established to R-AC and H&H was drawn.  Hemoglobin is 16.1 and hematocrit is 46.5 % today.  Pt meets parameters for TP.  500ml of whole blood removed.  Juice given to pt during observation period.  Orthostatic VS are WNL.  Pt denies dizziness or feeling light headed.  PIV removed and site wrapped with pressure dressing.    Email sent to scheduling dept to set up future appts.  Pt dc home in stable condition with her mother.

## 2023-07-17 PROCEDURE — RXMED WILLOW AMBULATORY MEDICATION CHARGE: Performed by: FAMILY MEDICINE

## 2023-07-17 PROCEDURE — RXMED WILLOW AMBULATORY MEDICATION CHARGE: Performed by: NURSE PRACTITIONER

## 2023-07-19 PROCEDURE — RXMED WILLOW AMBULATORY MEDICATION CHARGE: Performed by: FAMILY MEDICINE

## 2023-08-02 PROCEDURE — RXMED WILLOW AMBULATORY MEDICATION CHARGE: Performed by: FAMILY MEDICINE

## 2023-08-02 RX ORDER — TRAZODONE HYDROCHLORIDE 50 MG/1
50 TABLET ORAL NIGHTLY
Qty: 90 TABLET | Refills: 3 | Status: SHIPPED | OUTPATIENT
Start: 2023-08-02

## 2023-08-02 RX ORDER — OMEPRAZOLE 40 MG/1
40 CAPSULE, DELAYED RELEASE ORAL DAILY
Qty: 90 CAPSULE | Refills: 3 | Status: SHIPPED | OUTPATIENT
Start: 2023-08-02

## 2023-08-02 RX ORDER — CHLORTHALIDONE 25 MG/1
25 TABLET ORAL DAILY
Qty: 90 TABLET | Refills: 3 | Status: SHIPPED | OUTPATIENT
Start: 2023-08-02

## 2023-08-02 NOTE — TELEPHONE ENCOUNTER
Received request via: Pharmacy    Was the patient seen in the last year in this department? Yes    Does the patient have an active prescription (recently filled or refills available) for medication(s) requested? No    Does the patient have correction Plus and need 100 day supply (blood pressure, diabetes and cholesterol meds only)? Patient does not have SCP      Last Office Visit:06/29/2023  Last Labs:07/14/2023

## 2023-08-03 ENCOUNTER — PHARMACY VISIT (OUTPATIENT)
Dept: PHARMACY | Facility: MEDICAL CENTER | Age: 53
End: 2023-08-03
Payer: MEDICARE

## 2023-08-04 ENCOUNTER — DOCUMENTATION (OUTPATIENT)
Dept: PHARMACY | Facility: MEDICAL CENTER | Age: 53
End: 2023-08-04
Payer: COMMERCIAL

## 2023-08-04 NOTE — PROGRESS NOTES
(VA1) PHARMACIST NEW START -  Initial Assessment   Dx: Mixed HLD (E78.2)        Primary or Secondary Prevention of clinical ASCVD: Primary    Tx prescribed: Repatha 140mg/ml 1 pen SQ every 14     - Administration:      § Allow to reach RT 30min prior to admin      § Can inject into abd (2 inches away from belly button), thighs or upper arm (CG)     § Inject at 90degree; hold for about 15sec until you hear second or window will change completely yellow indicting that medication is fully administered     - Proper Handling/Disposal:     § Bring to room temp over 30 minutes but  do not shake, hold, or heat. Once brought to room temp not return to refrigeration     § Immediately after admin dispose in sharps container or other hard impenetrable container.     - Storage/Excursion: Keep in fridge; Excursion: up to 30 days at RT          - Duration of therapy: until progression, toxicity, or no longer clinically beneficial       Adherence & Potential Barriers: Will continue every other Sunday schedule from [facility] (next dose 08/13)    - Missed dose mgmt: If an every-2-week dose is not administered within 7 days, wait until the next dose on the original schedule. If a once-monthly dose is not administered within 7 days, administer the dose and start a new schedule based on this date.           List Common, Serious SE & Mitigation Reviewed:      Headache: APAP; adq hydration     Injection Site Reactions (swelling, bruising, pain, irritation): ice before/after admin; inj site rotation; top cortisone;     nasopharyngitis (cold symptoms ):  non-drowsy anti-histamines such as  clartifin or zyrtec       List Precautions Reviewed:      Hypersensitivity: Report to MD if any presentation/development of: hives; fever; joint pain; swelling of the tongue/face/lips; SOB/difficulty breathing.   List Current SE Reviewed (if applicable): n/a     - Mitigation/mgmt: none        S/Sx:  None       Clinically Relevant, Abnormal Labs:     -  Lipid Panel (10/10/2022): TC: 195N; TH; HDL: 46N; LDL: 96    - HeFH/HoFH: Likely Heterozygous familial hypercholesterolemia    Wellness/Lifestyle Counseling: deferred    - Immunizations: Per EMR, pt is uptodate on shingles, tdap, pneumonia, covid and receives yearly flu shot        Med Rec/Updated drug list:    EMR accurate, no medication list inaccuracies. Reviewed with patient/caregiver.   DI Check:  None relevant      Common DI & Dietary Avoidance: None       Goals of Therapy:      - Achieve goal LDL levels (<  mg/dL) to reduce risk of cardiovascular events    - Implement lifestyle modifications: diet, exercise, weight loss, and smoking cessation      Patient has agreed/understands to goals of therapy during education/counseling   Additional: Spoke with Sheryl who was very brief but she is transitioning from Praluent to Repatha due to formulary change. She tolerated praluent pretty well and questioned why insurance would change it. I told her that sometime there are formulary changes unfortunately but we will be monitoring her closely and if she has SE or concerns once starting the medication, please give us a call. Her next dose will be on  and I'll pend 1st cycle call accordingly. Has no further questions and thanked us for the call.    - Merari boschD      (VA1) PHARMACIST PRE SCREEN - New Onboarding  Diagnosis: Mixed HLD (E78.2)     Drug & Non-Drug Allergies:   EMR Reviewed. No concerning drug or non-drug allergies.    Drug Therapy (name/formulation/dose/route of admin/freq): Repatha 140mg/ml 1 pen SQ every 14 days   EMR Reviewed  - Dose Appropriateness (Y/N): Y  - Renal or hepatic dose adjustments (Y/N): N   - Any comorbidities, PMH, precautions or contraindications exist that pose medication safety concerns (Y/N): N   - Any relevant lab(s) needed that affects the initial start date (Y/N): N   List Past Treatments: Atorvastatin, Praluent   List DDI’s: None relevant     Patient’s ability to  self-administer medication:  No issues identified per EMR       List Goals of Therapy:  - Achieve goal LDL levels (<  mg/dL) to reduce risk of cardiovascular events  - Implement lifestyle modifications: diet, exercise, weight loss, and smoking cessation

## 2023-08-21 NOTE — PROGRESS NOTES
[facility] - Initial Visit  Date of Service: 03/18/22  Time: 1:50 PM to 2:21 PM    Eric Smith has been referred for evaluation and management of dyslipidemia    Referral Source: Dr. Dorsey     Subjective    HPI  History of ASCVD:   Possible due to CAC of 9.3, and likely this underestimates her degree of plaque due to her age.  She is in the 75th percentile.  Other Established (non-atherosclerotic) Vascular Disease, if Present: None  Age at Initial Diagnosis of Dyslipidemia: 20s    Current Prescription Lipid Lowering Medications - including dose:   Statin: None  Non-Statin: Zetia 10mg   Current Lipid Lowering and Related Supplements:   Vitamin D  Any Current Side Effects Potentially Related to Lipid Lowering therapy?   Joint pain, some muscle pain, but has only been off Lipitor 2 weeks   Current Adherence to Lipid Lowering Therapies   Had to stop Lipitor, but continued Zetia  Previously Attempted Interventions for Lipids - including outcome  Statin: Crestor 20mg, and Lipitor 40mg    Outcome: muscle pain, N/V, vertigo   Non-Statin: lopid 600mg    Outcome: cannot remember, but thinks it may have made her myalgias worse  Any Previous History of Statin Intolerance?   Yes, Crestor and Lipitor  Baseline Lipids Prior to Treatment:   Results for ERIC SMITH (MRN [id number]) as of 3/18/2022 13:47   Ref. Range 1/12/2022 06:28   Cholesterol,Tot Latest Ref Range: 100 - 199 mg/dL 352 (H)   Triglycerides Latest Ref Range: 0 - 149 mg/dL 424 (H)   HDL Latest Ref Range: >=40 mg/dL 38 (A)   LDL Latest Ref Range: <100 mg/dL see below     Other Pertinent History: no    History of other CV risk factors: family history     PAST MEDICAL HISTORY:  has a past medical history of Asthma, Bipolar 1 disorder, depressed (HCC), Heart burn, High cholesterol (04/08/2021), Hypertension (04/08/2021), and Hypothyroidism.    PAST SURGICAL HISTORY:  has a past surgical history that includes hysterectomy, total abdominal; pr  Left vmom for pt to c/b. C/B # and [place] provided. lap, pedro restrict proc, longitudinal gas* (2021); and pr unlisted laparoscopic proc, liver (2021).    CURRENT MEDICATIONS:   Current Outpatient Medications:   •  ondansetron, DISSOLVE 1 TABLET IN MOUTH EVERY 12 HOURS AS NEEDED FOR NAUSEA FOR VOMITING  •  ezetimibe, 10 mg, Oral, DAILY (Patient not taking: Reported on 3/11/2022)  •  omeprazole, 40 mg, Oral, DAILY  •  chlorthalidone, 25 mg, Oral, DAILY  •  lamotrigine, 150 mg, Oral, DAILY  •  albuterol, 2 Puff, Inhalation, Q6HRS PRN    ALLERGIES: Dust mite extract    FAMILY HISTORY:   Grandfather- 48 MI, and 52  of MI   Dad -40's, MI    SOCIAL HISTORY   Social History     Tobacco Use   Smoking Status Former Smoker   • Years: 25.00   • Types: Cigarettes   • Quit date: 3/4/2017   • Years since quittin.0   Smokeless Tobacco Never Used   Tobacco Comment    3-4 cigarrette per day     Change in weight: lost about 5lb in 2 months   Exercise habits: moderate regular exercise program  Diet: low carbohydrate, high protein, no fried foods, no red meat     63% lean protein    Objective      There were no vitals filed for this visit.   Physical Exam    DATA REVIEW:  Most Recent Lipid Panel:   Lab Results   Component Value Date    CHOLSTRLTOT 212 (H) 2022    TRIGLYCERIDE 288 (H) 2022    HDL 36 (A) 2022     (H) 2022       Other Pertinent Blood Work:   Lab Results   Component Value Date    SODIUM 137 2022    POTASSIUM 3.6 2022    CHLORIDE 100 2022    CO2 24 2022    ANION 13.0 2022    GLUCOSE 107 (H) 2022    BUN 21 2022    CREATININE 0.87 2022    CALCIUM 9.8 2022    ASTSGOT 18 2022    ALTSGPT 14 2022    ALKPHOSPHAT 55 2022    TBILIRUBIN 0.5 2022    ALBUMIN 4.6 2022    AGRATIO 1.5 2022    TSHULTRASEN 2.270 2021       Other:     Recent Imaging Studies:    Coronary calcification:  LMA - 0.0  LCX - 9.3  LAD - 0.0  RCA - 0.0  PDA - 0.0     Total  Calcium Score: 9.3     Percentile: Calcium score is above the 75th percentile for the patient's age and sex.      ASSESSMENT AND PLAN  Patient Type, check all that apply:   The start of coronary artery disease, and likely familial hypercholesterolemia.  Recently had to stop Lipitor due to intolerance.  History of a gastric sleeve.   Established Atherosclerotic Cardiovascular Disease (ASCVD)  Some cardiovascular disease due to a coronary artery calcium score of 9.3, but below the threshold of 300.  This likely underestimates the degree of plaque due to her age.  Other Established (non-atherosclerotic) Vascular Disease, if Present:  none  Evidence of Heterozygous Familial Hypercholesterolemia (FH):   · Possible LDL levels above 190.   · Total cholesterol 352, HDLs 38, non-HDLs = 314. Therefore, LDLs likely >190, but unfortunately no direct LDL was measured at this time.  · Family history of premature coronary artery disease and death in multiple relatives  ACC/AHA Indication for Statin Therapy, eryn all that apply:  LDL-C at baseline >190 mg/dl: Indication for High intensity statin , But appears intolerant after using Crestor and Lipitor  Starting to see plaque in the LCX  Calculated Risk for ASCVD, if applicable    N/A, lifetime risk greater than 50%  Other Significant Risk Markers, if any, eryn all that apply   elevated coronary calcium score and Family history of premature ASCVD in first degree relative  Goal LDL-C and nonHDL-C based on Clinic Protocol  LDL-C:   <100 mg/dL, but ideally less than 70.   Lifestyle Recommendations From Today’s Visit:    Continue to focus on weight loss and exercise  Statin Therapy Recommendations from Today’s Visit:   Unable to tolerate statins  Non-Statin Medications Recommendations from Today’s Visit:   Continue Zetia 10 mg once daily  Indication for PCSK9 Inhibitor, if applicable:  FH with suboptimal control of LDL-C despite maximally tolerated statin   Start Repatha 140 mg every 14  days  Supplements Recommended at this visit:   Continue vitamin D  Recommendations for Other Cardiovascular Risk Factors, eryn all that apply:   Continue to focus on weight loss  Other Issues:  · If Repatha is not covered,  as her coronary artery calcium score is less than 300 and no documented direct LDL of greater than 190, may consider Nexletol at subsequent appointment  · Once her LDLs are under control we can target a triglyceride level of less than 150.  · As she recently stopped her Lipitor, her LDLs will likely increase at the next lipid panel without intervention.    Studies Ordered at Todays Visit:  Blood Work Ordered At Today’s visit: Lipid panel, direct LDL, lipoprotein a  Follow-Up: I will call her next week to determine if Repatha was approved.    Fermín Puentes, MerariD    CC:  Eunice Grissom M.D.  Dr. Bloch Janeen Abe  [facility]     Addendum:    Insurance requires Praluent.  New Rx sent and Rx Coordinator to inform pt of the change.     Jarred Del Rio, MerariD

## 2023-09-01 PROCEDURE — RXMED WILLOW AMBULATORY MEDICATION CHARGE: Performed by: FAMILY MEDICINE

## 2023-09-01 PROCEDURE — RXMED WILLOW AMBULATORY MEDICATION CHARGE: Performed by: NURSE PRACTITIONER

## 2023-09-01 NOTE — TELEPHONE ENCOUNTER
Requested Prescriptions     Pending Prescriptions Disp Refills    metFORMIN (GLUCOPHAGE) 500 MG Tab 60 Tablet 3     Sig: Take 1 tablet by mouth 2 times a day with meals.        Last office visit:6/29/23  Last lab: 3/20/23

## 2023-09-05 ENCOUNTER — TELEPHONE (OUTPATIENT)
Dept: PHARMACY | Facility: MEDICAL CENTER | Age: 53
End: 2023-09-05
Payer: COMMERCIAL

## 2023-09-05 ENCOUNTER — PHARMACY VISIT (OUTPATIENT)
Dept: PHARMACY | Facility: MEDICAL CENTER | Age: 53
End: 2023-09-05
Payer: MEDICARE

## 2023-09-05 NOTE — TELEPHONE ENCOUNTER
Contact:  ERIC SMITH           Phone number: [phone]     Name of person spoken with and relationship to patient: SELF   Patient’s Adherence:      Medication:    ZOFRAN [id number], REPATHA [id number](WANTS 84DS)     How patient is doing on medication: WELL     How many missed doses and reason: 0     Any new medications: no     Any new conditions: no     Any new allergies: no     Any new side effects: no      Any new diagnoses: no      How many doses remainin REPATHA     Did patient want to speak with pharmacist: no   Delivery:             Delivery date and method:      Needs by Date:      Signature required: no     Any additional details for : TERESA   Teach Appointment Date: NA   Shipping Address: [address] [place] [address] DORETHA NUÑEZ [id number]   Medication(name, strength and dose):    Copay: 95.22   Payment Method: CCOF   Supplies: NA   Additional Information:   SENT RR FOR METFORMIN. MAY SEND WITH OTHER MEDS IF

## 2023-09-15 ENCOUNTER — TELEPHONE (OUTPATIENT)
Dept: ONCOLOGY | Facility: MEDICAL CENTER | Age: 53
End: 2023-09-15
Payer: COMMERCIAL

## 2023-09-16 NOTE — TELEPHONE ENCOUNTER
Pt no show for appt. MIKE Ingris called pt and she had forgotten about appt. She was transferred to scheduling to re-schedule appt.

## 2023-09-22 ENCOUNTER — OUTPATIENT INFUSION SERVICES (OUTPATIENT)
Dept: ONCOLOGY | Facility: MEDICAL CENTER | Age: 53
End: 2023-09-22
Attending: INTERNAL MEDICINE
Payer: COMMERCIAL

## 2023-09-22 VITALS
BODY MASS INDEX: 29.86 KG/M2 | WEIGHT: 162.26 LBS | RESPIRATION RATE: 18 BRPM | OXYGEN SATURATION: 97 % | DIASTOLIC BLOOD PRESSURE: 95 MMHG | TEMPERATURE: 96.9 F | HEART RATE: 63 BPM | SYSTOLIC BLOOD PRESSURE: 121 MMHG | HEIGHT: 62 IN

## 2023-09-22 DIAGNOSIS — D45 POLYCYTHEMIA VERA (HCC): ICD-10-CM

## 2023-09-22 LAB
HCT VFR BLD AUTO: 45.4 % (ref 37–47)
HGB BLD-MCNC: 15.5 G/DL (ref 12–16)

## 2023-09-22 PROCEDURE — 85018 HEMOGLOBIN: CPT

## 2023-09-22 PROCEDURE — 99195 PHLEBOTOMY: CPT

## 2023-09-22 PROCEDURE — 85014 HEMATOCRIT: CPT

## 2023-09-22 RX ORDER — SODIUM CHLORIDE 9 MG/ML
500 INJECTION, SOLUTION INTRAVENOUS ONCE
OUTPATIENT
Start: 2023-09-22 | End: 2023-09-22

## 2023-09-22 ASSESSMENT — FIBROSIS 4 INDEX: FIB4 SCORE: 0.66

## 2023-09-23 NOTE — PROGRESS NOTES
Ms Blake is here today for therapeutic phlebotomy.    She voices no new concerns.    20 g IV placed to her left AC. Labs drawn, HGB 15.5 HCT 45.4.    500 ml of whole blood removed and was tolerated well.    Orthostatic VS are within normal limits, pt denies an light headedness.     IV was removed intact. Gauze and tape applied to the site.       Ms Blake was discharged in stable condition.

## 2023-09-26 PROCEDURE — RXMED WILLOW AMBULATORY MEDICATION CHARGE: Performed by: NURSE PRACTITIONER

## 2023-09-27 PROCEDURE — RXMED WILLOW AMBULATORY MEDICATION CHARGE: Performed by: NURSE PRACTITIONER

## 2023-09-28 ENCOUNTER — PHARMACY VISIT (OUTPATIENT)
Dept: PHARMACY | Facility: MEDICAL CENTER | Age: 53
End: 2023-09-28
Payer: MEDICARE

## 2023-10-03 ENCOUNTER — APPOINTMENT (OUTPATIENT)
Dept: RADIOLOGY | Facility: IMAGING CENTER | Age: 53
End: 2023-10-03
Attending: FAMILY MEDICINE
Payer: COMMERCIAL

## 2023-10-03 ENCOUNTER — APPOINTMENT (OUTPATIENT)
Dept: URGENT CARE | Facility: PHYSICIAN GROUP | Age: 53
End: 2023-10-03
Payer: COMMERCIAL

## 2023-10-03 DIAGNOSIS — M25.561 CHRONIC PAIN OF RIGHT KNEE: ICD-10-CM

## 2023-10-03 DIAGNOSIS — M21.612 BUNION, LEFT FOOT: ICD-10-CM

## 2023-10-03 DIAGNOSIS — G89.29 CHRONIC PAIN OF RIGHT KNEE: ICD-10-CM

## 2023-10-03 PROCEDURE — 73620 X-RAY EXAM OF FOOT: CPT | Mod: TC,FY,LT | Performed by: FAMILY MEDICINE

## 2023-10-03 PROCEDURE — 73560 X-RAY EXAM OF KNEE 1 OR 2: CPT | Mod: TC,FY,RT | Performed by: FAMILY MEDICINE

## 2023-10-29 PROCEDURE — RXMED WILLOW AMBULATORY MEDICATION CHARGE: Performed by: FAMILY MEDICINE

## 2023-10-30 PROCEDURE — RXMED WILLOW AMBULATORY MEDICATION CHARGE: Performed by: FAMILY MEDICINE

## 2023-10-30 RX ORDER — ONDANSETRON 8 MG/1
TABLET, ORALLY DISINTEGRATING ORAL
Qty: 15 TABLET | Refills: 3 | Status: SHIPPED | OUTPATIENT
Start: 2023-10-30 | End: 2024-03-11 | Stop reason: SDUPTHER

## 2023-11-03 ENCOUNTER — PHARMACY VISIT (OUTPATIENT)
Dept: PHARMACY | Facility: MEDICAL CENTER | Age: 53
End: 2023-11-03
Payer: MEDICARE

## 2023-11-07 PROCEDURE — RXMED WILLOW AMBULATORY MEDICATION CHARGE: Performed by: FAMILY MEDICINE

## 2023-11-07 RX ORDER — EVOLOCUMAB 140 MG/ML
140 INJECTION, SOLUTION SUBCUTANEOUS
Qty: 2 ML | Refills: 3 | Status: SHIPPED | OUTPATIENT
Start: 2023-11-07 | End: 2023-12-22 | Stop reason: SDUPTHER

## 2023-11-10 NOTE — TELEPHONE ENCOUNTER
Contact:  Phone number:[phone] (mobile)    Name of person spoken with and relationship to patient: SELF   Patient’s Adherence:  How patient is doing on medication: Very Well    How many missed doses and reason: 0 N/A    Any new medications: no    Any new conditions: no    Any new allergies: no    Any new side effects: no    Any new diagnoses: no    How many doses remainin    Did patient want to speak with pharmacist: No   Delivery:  Delivery date and method: 11/10/2023 via [employer] COLD SHIP    Next Injection Date: 2023    Signature required: No     Any additional details for :  LEAVE AT FRONT DOOR    Teach Appointment Date:  N/A   Shipping Address:  [address] [place] [phone]   Medication(name,strength and dose):  REPATHA SURECLICK 140MG/ML SOLUTION AUTO INJECTOR ; ONDANSETRON 8MG TBDP   Copay:  $10.22   Payment Method:  Credit card on file   Supplies:  SHARPS CONTAINER, ALCOHOL SWABS, B[employer]   Additional Information:  Called and spoke to Pt @ [phone] to discuss delivery options for her medications. Per Pt, she states that she would like to get it set it up for delivery for today. This liaison also noticed that Pt is not enrolled for Repatha copay card, and asked Pt for a consent to enroll her for the program. Pt consented and her copay went down to $10.22. this liaison called and LVM to give the pt a status update for the approval.      SOLOMON Bernal, PhT  Pharmacy Liaison (Rx Coordinator)  P: [phone]  11/10/2023 12:54 PM

## 2023-11-13 ENCOUNTER — PHARMACY VISIT (OUTPATIENT)
Dept: PHARMACY | Facility: MEDICAL CENTER | Age: 53
End: 2023-11-13
Payer: MEDICARE

## 2023-11-16 ENCOUNTER — HOSPITAL ENCOUNTER (OUTPATIENT)
Dept: CARDIOLOGY | Facility: MEDICAL CENTER | Age: 53
End: 2023-11-16
Attending: PODIATRIST
Payer: COMMERCIAL

## 2023-11-16 LAB — EKG IMPRESSION: NORMAL

## 2023-11-16 PROCEDURE — 93010 ELECTROCARDIOGRAM REPORT: CPT | Performed by: INTERNAL MEDICINE

## 2023-11-16 PROCEDURE — 93005 ELECTROCARDIOGRAM TRACING: CPT | Performed by: PODIATRIST

## 2023-11-16 NOTE — ADDENDUM NOTE
Encounter addended by: Bhargav Combs, Med Ass't on: 11/16/2023 2:14 PM   Actions taken: Order list changed

## 2023-11-30 ENCOUNTER — TELEPHONE (OUTPATIENT)
Dept: URGENT CARE | Facility: PHYSICIAN GROUP | Age: 53
End: 2023-11-30
Payer: COMMERCIAL

## 2023-12-01 ENCOUNTER — APPOINTMENT (OUTPATIENT)
Dept: MEDICAL GROUP | Facility: PHYSICIAN GROUP | Age: 53
End: 2023-12-01
Payer: COMMERCIAL

## 2023-12-01 DIAGNOSIS — E78.2 MIXED HYPERLIPIDEMIA: ICD-10-CM

## 2023-12-01 DIAGNOSIS — D45 POLYCYTHEMIA VERA (HCC): ICD-10-CM

## 2023-12-18 PROCEDURE — RXMED WILLOW AMBULATORY MEDICATION CHARGE: Performed by: FAMILY MEDICINE

## 2023-12-18 PROCEDURE — RXMED WILLOW AMBULATORY MEDICATION CHARGE: Performed by: NURSE PRACTITIONER

## 2023-12-20 ENCOUNTER — PHARMACY VISIT (OUTPATIENT)
Dept: PHARMACY | Facility: MEDICAL CENTER | Age: 53
End: 2023-12-20
Payer: MEDICARE

## 2023-12-22 RX ORDER — EVOLOCUMAB 140 MG/ML
140 INJECTION, SOLUTION SUBCUTANEOUS
Qty: 2 ML | Refills: 3 | Status: SHIPPED | OUTPATIENT
Start: 2023-12-22

## 2023-12-22 NOTE — TELEPHONE ENCOUNTER
Requested Prescriptions     Pending Prescriptions Disp Refills    Evolocumab (REPATHA SURECLICK) 140 MG/ML Solution Auto-injector SubQ injection pen 2 mL 3     Sig: Inject 1 mL under the skin every 14 days.      Last office visit: 6/29/23  Last lab: 3/20/23

## 2023-12-28 ENCOUNTER — TELEPHONE (OUTPATIENT)
Dept: PHARMACY | Facility: MEDICAL CENTER | Age: 53
End: 2023-12-28
Payer: COMMERCIAL

## 2023-12-28 NOTE — TELEPHONE ENCOUNTER
Contact: [phone]  Margareth Blake  (goes by Sheryl)        Phone number: [phone]    Name of person spoken with and relationship to patient: Sheryl, self   Patient’s Adherence:            How patient is doing on medication: well     How many missed doses and reason: 0    Any new medications: no    Any new conditions: no    Any new allergies: no    Any new side effects: no     Any new diagnoses: no     How many doses remainin (prior to last fill on )    Did patient want to speak with pharmacist: no  Delivery:            Delivery date and method:  patient already received on . Adherence questions completed    Needs by Date:  na    Signature required:  no    Any additional details for :  dave Quinones Appointment Date:  2023  Shipping Address:  [address] [place] [phone]  Medication(name, strength and dose):  Repatha SureClick 140 MG/ML Soaj SubQ Inject every 14 days  Copay: $0  Payment Method: na  Supplies:  na  Additional info:  ALEJANDRA Saucedo. She stated doing well on the medication and not having any side effects or issues. No further questions/concerns at this time

## 2024-01-24 ENCOUNTER — TELEPHONE (OUTPATIENT)
Dept: HEMATOLOGY ONCOLOGY | Facility: MEDICAL CENTER | Age: 54
End: 2024-01-24
Payer: COMMERCIAL

## 2024-01-30 ENCOUNTER — TELEPHONE (OUTPATIENT)
Dept: HEMATOLOGY ONCOLOGY | Facility: MEDICAL CENTER | Age: 54
End: 2024-01-30
Payer: COMMERCIAL

## 2024-01-30 DIAGNOSIS — D45 POLYCYTHEMIA VERA (HCC): ICD-10-CM

## 2024-01-30 NOTE — TELEPHONE ENCOUNTER
Patient has appointment in two days, and there are no lab orders in.     Please put lab orders in, and she will get them drawn tomorrow in [place].    small/normal

## 2024-01-31 ENCOUNTER — HOSPITAL ENCOUNTER (OUTPATIENT)
Dept: LAB | Facility: MEDICAL CENTER | Age: 54
End: 2024-01-31
Attending: NURSE PRACTITIONER
Payer: COMMERCIAL

## 2024-01-31 DIAGNOSIS — D45 POLYCYTHEMIA VERA (HCC): ICD-10-CM

## 2024-01-31 LAB
BASOPHILS # BLD AUTO: 0.6 % (ref 0–1.8)
BASOPHILS # BLD: 0.04 K/UL (ref 0–0.12)
EOSINOPHIL # BLD AUTO: 0.17 K/UL (ref 0–0.51)
EOSINOPHIL NFR BLD: 2.6 % (ref 0–6.9)
ERYTHROCYTE [DISTWIDTH] IN BLOOD BY AUTOMATED COUNT: 49.1 FL (ref 35.9–50)
FERRITIN SERPL-MCNC: 16.7 NG/ML (ref 10–291)
HCT VFR BLD AUTO: 42.9 % (ref 37–47)
HGB BLD-MCNC: 14.4 G/DL (ref 12–16)
IMM GRANULOCYTES # BLD AUTO: 0.02 K/UL (ref 0–0.11)
IMM GRANULOCYTES NFR BLD AUTO: 0.3 % (ref 0–0.9)
IRON SATN MFR SERPL: 25 % (ref 15–55)
IRON SERPL-MCNC: 86 UG/DL (ref 40–170)
LYMPHOCYTES # BLD AUTO: 2.87 K/UL (ref 1–4.8)
LYMPHOCYTES NFR BLD: 43.2 % (ref 22–41)
MCH RBC QN AUTO: 31.5 PG (ref 27–33)
MCHC RBC AUTO-ENTMCNC: 33.6 G/DL (ref 32.2–35.5)
MCV RBC AUTO: 93.9 FL (ref 81.4–97.8)
MONOCYTES # BLD AUTO: 0.6 K/UL (ref 0–0.85)
MONOCYTES NFR BLD AUTO: 9 % (ref 0–13.4)
NEUTROPHILS # BLD AUTO: 2.95 K/UL (ref 1.82–7.42)
NEUTROPHILS NFR BLD: 44.3 % (ref 44–72)
NRBC # BLD AUTO: 0 K/UL
NRBC BLD-RTO: 0 /100 WBC (ref 0–0.2)
PLATELET # BLD AUTO: 441 K/UL (ref 164–446)
PMV BLD AUTO: 10.1 FL (ref 9–12.9)
RBC # BLD AUTO: 4.57 M/UL (ref 4.2–5.4)
TIBC SERPL-MCNC: 351 UG/DL (ref 250–450)
UIBC SERPL-MCNC: 265 UG/DL (ref 110–370)
WBC # BLD AUTO: 6.7 K/UL (ref 4.8–10.8)

## 2024-01-31 PROCEDURE — 82728 ASSAY OF FERRITIN: CPT

## 2024-01-31 PROCEDURE — 83540 ASSAY OF IRON: CPT

## 2024-01-31 PROCEDURE — 36415 COLL VENOUS BLD VENIPUNCTURE: CPT

## 2024-01-31 PROCEDURE — 83550 IRON BINDING TEST: CPT

## 2024-01-31 PROCEDURE — 85025 COMPLETE CBC W/AUTO DIFF WBC: CPT

## 2024-02-01 ENCOUNTER — OUTPATIENT INFUSION SERVICES (OUTPATIENT)
Dept: ONCOLOGY | Facility: MEDICAL CENTER | Age: 54
End: 2024-02-01
Attending: INTERNAL MEDICINE
Payer: COMMERCIAL

## 2024-02-01 ENCOUNTER — HOSPITAL ENCOUNTER (OUTPATIENT)
Dept: HEMATOLOGY ONCOLOGY | Facility: MEDICAL CENTER | Age: 54
End: 2024-02-01
Attending: NURSE PRACTITIONER
Payer: COMMERCIAL

## 2024-02-01 DIAGNOSIS — Z09 ENCOUNTER FOR HEMATOLOGY FOLLOW-UP: ICD-10-CM

## 2024-02-01 DIAGNOSIS — D45 POLYCYTHEMIA VERA (HCC): ICD-10-CM

## 2024-02-01 PROCEDURE — 99214 OFFICE O/P EST MOD 30 MIN: CPT | Mod: 95 | Performed by: NURSE PRACTITIONER

## 2024-02-01 PROCEDURE — RXMED WILLOW AMBULATORY MEDICATION CHARGE: Performed by: FAMILY MEDICINE

## 2024-02-01 PROCEDURE — 99211 OFF/OP EST MAY X REQ PHY/QHP: CPT | Performed by: NURSE PRACTITIONER

## 2024-02-01 PROCEDURE — 99212 OFFICE O/P EST SF 10 MIN: CPT | Performed by: NURSE PRACTITIONER

## 2024-02-01 ASSESSMENT — ENCOUNTER SYMPTOMS
NAUSEA: 1
WHEEZING: 0
TINGLING: 1
PALPITATIONS: 0
MYALGIAS: 0
SHORTNESS OF BREATH: 0
CONSTIPATION: 1
DIARRHEA: 0
VOMITING: 0
CHILLS: 0
COUGH: 0
HEADACHES: 0
INSOMNIA: 1
WEIGHT LOSS: 0
DIZZINESS: 0
FEVER: 0

## 2024-02-01 NOTE — PROGRESS NOTES
"Subjective     Margareth Blake is a 53 y.o. female who presents with Follow-Up (Alonzo/ Randall/ NINA-2 gene mutation/fv)              HPI  Ms. Blake presents for surveillance evaluation of polycythemia vera: JAK2 positive; normal erythropoietin. Visit is conducted as an on demand video visit using Zoom and patient is unaccompanied today.    Patient was referred for hematology evaluation of elevated H/H (polycythemia). In January 2022 erythropoietin was normal; JAK2 low-level positive positive. She has history of gastric sleeve. Patient proceeded with surveillance with goal to keep hemoglobin less than 18.5. She initiated therapeutic phlebotomies in 12/2022, approximately monthly with frequency since decreased to approximately every 3 months.    Patient had foot surgery in December and has resumed working out.  She is somewhat fatigued since surgery, despite multivitamin regimen, and worsens after eating.  She is otherwise at her baseline.      Review of Systems   Constitutional:  Positive for malaise/fatigue (tired but just had surgery; despite MVI regimen; not worse since VSG; \"crashes after PO intake\" since prior to VSG). Negative for chills, fever and weight loss (stable).   Respiratory:  Negative for cough, shortness of breath and wheezing.         No sleep apnea but desat's   Cardiovascular:  Negative for chest pain, palpitations and leg swelling.   Gastrointestinal:  Positive for constipation (stool softener daily) and nausea (zofran about 1/week PRN). Negative for diarrhea and vomiting.        S/p VSG 2021   Genitourinary:  Negative for dysuria.   Musculoskeletal:  Positive for joint pain (consistent with baseline). Negative for myalgias.        S/p foot surgery 12/8/23, has resumed working out   Neurological:  Positive for tingling (hands and feet, no DM (pre-diabetic)). Negative for dizziness and headaches.   Psychiatric/Behavioral:  The patient has insomnia (consistent with baseline).  "     Allergies   Allergen Reactions    Atorvastatin Myalgia    Dust Mite Extract Unspecified     Other reaction(s): Unknown  Other reaction(s): Unknown    Molds & Smuts Unspecified    Penicillins Hives and Unspecified     rash       Current Outpatient Medications on File Prior to Encounter   Medication Sig Dispense Refill    Evolocumab (REPATHA SURECLICK) 140 MG/ML Solution Auto-injector SubQ injection pen Inject 1 mL under the skin every 14 days. 2 mL 3    ondansetron (ZOFRAN ODT) 8 MG TABLET DISPERSIBLE DISSOLVE 1 TABLET IN MOUTH EVERY 12 HOURS AS NEEDED FOR NAUSEA FOR VOMITING 15 Tablet 3    metFORMIN (GLUCOPHAGE) 500 MG Tab Take 1 tablet by mouth 2 times a day with meals. 180 Tablet 3    traZODone (DESYREL) 50 MG Tab Take 1 tablet by mouth every evening. 90 Tablet 3    omeprazole (PRILOSEC) 40 MG delayed-release capsule Take 1 capsule by mouth every day. 90 Capsule 3    chlorthalidone (HYGROTON) 25 MG Tab Take 1 tablet by mouth every day. 90 Tablet 3    lamotrigine (LAMICTAL) 150 MG tablet Take 1 Tablet by mouth every day. 90 Tablet 3    atomoxetine (STRATTERA) 18 MG capsule Take 1 capsule by mouth every morning. 90 Capsule 3    albuterol 108 (90 Base) MCG/ACT Aero Soln inhalation aerosol Inhale 2 Puffs every 6 hours as needed for Shortness of Breath. 8.5 g 5     No current facility-administered medications on file prior to encounter.            Objective     There were no vitals taken for this visit.     Physical Exam  Constitutional:       General: She is not in acute distress.     Appearance: Normal appearance. She is not ill-appearing.   Pulmonary:      Effort: Pulmonary effort is normal.   Skin:     Coloration: Skin is not jaundiced or pale.   Neurological:      Mental Status: She is alert and oriented to person, place, and time. Mental status is at baseline.   Psychiatric:         Mood and Affect: Mood normal.           Latest Reference Range & Units 01/31/24 11:39   WBC 4.8 - 10.8 K/uL 6.7   RBC 4.20 -  5.40 M/uL 4.57   Hemoglobin 12.0 - 16.0 g/dL 14.4   Hematocrit 37.0 - 47.0 % 42.9   MCV 81.4 - 97.8 fL 93.9   MCH 27.0 - 33.0 pg 31.5   MCHC 32.2 - 35.5 g/dL 33.6   RDW 35.9 - 50.0 fL 49.1   Platelet Count 164 - 446 K/uL 441   MPV 9.0 - 12.9 fL 10.1   Neutrophils-Polys 44.00 - 72.00 % 44.30   Neutrophils (Absolute) 1.82 - 7.42 K/uL 2.95   Lymphocytes 22.00 - 41.00 % 43.20 (H)   Lymphs (Absolute) 1.00 - 4.80 K/uL 2.87   Monocytes 0.00 - 13.40 % 9.00   Monos (Absolute) 0.00 - 0.85 K/uL 0.60   Eosinophils 0.00 - 6.90 % 2.60   Eos (Absolute) 0.00 - 0.51 K/uL 0.17   Basophils 0.00 - 1.80 % 0.60   Baso (Absolute) 0.00 - 0.12 K/uL 0.04   Immature Granulocytes 0.00 - 0.90 % 0.30   Immature Granulocytes (abs) 0.00 - 0.11 K/uL 0.02   Nucleated RBC 0.00 - 0.20 /100 WBC 0.00   NRBC (Absolute) K/uL 0.00   (H): Data is abnormally high                             Assessment & Plan     1. Polycythemia vera (HCC)  CBC WITH DIFFERENTIAL    IRON/TOTAL IRON BIND    FERRITIN    Comp Metabolic Panel      2. Encounter for hematology follow-up            1.  PCV: Diagnosed 1/2022; managed w/ TP ~q3m    Last TP 9/22/23 with stable counts - threshold continues at /H of 14/42 (500 mL), presently approx. Q 3 mo.  SHe is a bit fatigued and so will defer upcoming TP for about a month, given stable labs, and return in 6 months for re-evaluation, sooner as needed.      The patient verbalized agreement and understanding of current plan. All questions and concerns were addressed at time of visit.    Please note that this dictation was created using voice recognition software. I have made every reasonable attempt to correct obvious errors, but I expect that there are errors of grammar and possibly content that I did not discover before finalizing the note.      This evaluation was conducted via Zoom using secure and encrypted videoconferencing technology. The patient was in their home in the [facility].  The patient's identity was confirmed and  verbal consent was obtained for this virtual visit.

## 2024-02-05 ENCOUNTER — PHARMACY VISIT (OUTPATIENT)
Dept: PHARMACY | Facility: MEDICAL CENTER | Age: 54
End: 2024-02-05
Payer: MEDICARE

## 2024-03-11 ENCOUNTER — TELEPHONE (OUTPATIENT)
Dept: VASCULAR LAB | Facility: MEDICAL CENTER | Age: 54
End: 2024-03-11
Payer: COMMERCIAL

## 2024-03-11 PROCEDURE — RXMED WILLOW AMBULATORY MEDICATION CHARGE: Performed by: FAMILY MEDICINE

## 2024-03-11 PROCEDURE — RXMED WILLOW AMBULATORY MEDICATION CHARGE: Performed by: NURSE PRACTITIONER

## 2024-03-11 NOTE — TELEPHONE ENCOUNTER
Prior Authorization for Repatha SureClick 140MG/ML auto-inj (Quantity: 6mls, Days: 84D) has been submitted via Cover My Meds: Key ([id number])    Insurance: [employer]     Will follow up in 24-48 business hours.
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
## 2024-03-11 NOTE — TELEPHONE ENCOUNTER
Prior Authorization for Repatha SureClick 140MG/ML auto-inj has been approved for a quantity of 6mls , day supply 84    [facility]    Prior Authorization Reference#-[id number]    Dates in effect, from 03/11/24 through 03/11/25    Co-pay: $90/6mls/84D    Pharmacy and phone number   [facility](Already fills)    Is patient eligible to fill with [facility]? Yes-already fills     FA-Co-Pay Card eligible    Next Steps:  Routing approval to liaison(s)

## 2024-03-12 ENCOUNTER — PHARMACY VISIT (OUTPATIENT)
Dept: PHARMACY | Facility: MEDICAL CENTER | Age: 54
End: 2024-03-12
Payer: MEDICARE

## 2024-03-12 PROCEDURE — RXMED WILLOW AMBULATORY MEDICATION CHARGE: Performed by: NURSE PRACTITIONER

## 2024-03-12 RX ORDER — ONDANSETRON 8 MG/1
TABLET, ORALLY DISINTEGRATING ORAL
Qty: 15 TABLET | Refills: 3 | Status: SHIPPED | OUTPATIENT
Start: 2024-03-12

## 2024-03-12 NOTE — TELEPHONE ENCOUNTER
Requested Prescriptions     Pending Prescriptions Disp Refills    ondansetron (ZOFRAN ODT) 8 MG TABLET DISPERSIBLE 15 Tablet 3     Sig: DISSOLVE 1 TABLET IN MOUTH EVERY 12 HOURS AS NEEDED FOR NAUSEA FOR VOMITING      Last office visit: 6/29/23  Last lab: 1/31/24

## 2024-03-15 ENCOUNTER — OUTPATIENT INFUSION SERVICES (OUTPATIENT)
Dept: ONCOLOGY | Facility: MEDICAL CENTER | Age: 54
End: 2024-03-15
Attending: INTERNAL MEDICINE
Payer: COMMERCIAL

## 2024-03-15 NOTE — PROGRESS NOTES
Patient did not show for appointment. Nurse called patient and patient stated they called to cancel appointment yesterday around 5pm. Patient will call to reschedule.

## 2024-03-21 ENCOUNTER — PHARMACY VISIT (OUTPATIENT)
Dept: PHARMACY | Facility: MEDICAL CENTER | Age: 54
End: 2024-03-21
Payer: MEDICARE

## 2024-03-21 PROCEDURE — RXMED WILLOW AMBULATORY MEDICATION CHARGE: Performed by: NURSE PRACTITIONER

## 2024-03-21 PROCEDURE — RXMED WILLOW AMBULATORY MEDICATION CHARGE: Performed by: FAMILY MEDICINE

## 2024-03-22 ENCOUNTER — PHARMACY VISIT (OUTPATIENT)
Dept: PHARMACY | Facility: MEDICAL CENTER | Age: 54
End: 2024-03-22
Payer: MEDICARE

## 2024-04-26 PROCEDURE — RXMED WILLOW AMBULATORY MEDICATION CHARGE: Performed by: NURSE PRACTITIONER

## 2024-04-26 PROCEDURE — RXMED WILLOW AMBULATORY MEDICATION CHARGE: Performed by: FAMILY MEDICINE

## 2024-04-26 RX ORDER — ATOMOXETINE 18 MG/1
18 CAPSULE ORAL EVERY MORNING
Qty: 90 CAPSULE | Refills: 3 | Status: SHIPPED | OUTPATIENT
Start: 2024-04-26

## 2024-04-26 NOTE — TELEPHONE ENCOUNTER
Requested Prescriptions     Pending Prescriptions Disp Refills    atomoxetine (STRATTERA) 18 MG capsule 90 Capsule 3     Sig: Take 1 capsule by mouth every morning.      Last office visit: 6/29/23  Last lab: 1/31/24

## 2024-05-17 ENCOUNTER — APPOINTMENT (OUTPATIENT)
Dept: MEDICAL GROUP | Facility: PHYSICIAN GROUP | Age: 54
End: 2024-05-17
Payer: COMMERCIAL

## 2024-05-21 ENCOUNTER — PHARMACY VISIT (OUTPATIENT)
Dept: PHARMACY | Facility: MEDICAL CENTER | Age: 54
End: 2024-05-21
Payer: MEDICARE

## 2024-05-21 PROCEDURE — RXMED WILLOW AMBULATORY MEDICATION CHARGE: Performed by: NURSE PRACTITIONER

## 2024-05-23 ENCOUNTER — PHARMACY VISIT (OUTPATIENT)
Dept: PHARMACY | Facility: MEDICAL CENTER | Age: 54
End: 2024-05-23
Payer: MEDICARE

## 2024-06-02 ENCOUNTER — OFFICE VISIT (OUTPATIENT)
Dept: URGENT CARE | Facility: PHYSICIAN GROUP | Age: 54
End: 2024-06-02
Payer: COMMERCIAL

## 2024-06-02 VITALS
RESPIRATION RATE: 18 BRPM | BODY MASS INDEX: 29.81 KG/M2 | HEIGHT: 62 IN | WEIGHT: 162 LBS | HEART RATE: 91 BPM | TEMPERATURE: 97.1 F | DIASTOLIC BLOOD PRESSURE: 122 MMHG | SYSTOLIC BLOOD PRESSURE: 154 MMHG | OXYGEN SATURATION: 98 %

## 2024-06-02 DIAGNOSIS — R10.13 DYSPEPSIA: ICD-10-CM

## 2024-06-02 DIAGNOSIS — R07.89 BURNING IN THE CHEST: ICD-10-CM

## 2024-06-02 DIAGNOSIS — R10.9 ABDOMINAL PAIN IN FEMALE: ICD-10-CM

## 2024-06-02 DIAGNOSIS — I10 ESSENTIAL HYPERTENSION: ICD-10-CM

## 2024-06-02 LAB
APPEARANCE UR: CLEAR
BILIRUB UR STRIP-MCNC: NORMAL MG/DL
COLOR UR AUTO: YELLOW
GLUCOSE UR STRIP.AUTO-MCNC: NORMAL MG/DL
KETONES UR STRIP.AUTO-MCNC: NORMAL MG/DL
LEUKOCYTE ESTERASE UR QL STRIP.AUTO: NORMAL
NITRITE UR QL STRIP.AUTO: NORMAL
PH UR STRIP.AUTO: 6.5 [PH] (ref 5–8)
PROT UR QL STRIP: NORMAL MG/DL
RBC UR QL AUTO: NORMAL
SP GR UR STRIP.AUTO: NORMAL
UROBILINOGEN UR STRIP-MCNC: 0.2 MG/DL

## 2024-06-02 PROCEDURE — 3077F SYST BP >= 140 MM HG: CPT | Performed by: PHYSICIAN ASSISTANT

## 2024-06-02 PROCEDURE — 99214 OFFICE O/P EST MOD 30 MIN: CPT | Performed by: PHYSICIAN ASSISTANT

## 2024-06-02 PROCEDURE — 81002 URINALYSIS NONAUTO W/O SCOPE: CPT | Performed by: PHYSICIAN ASSISTANT

## 2024-06-02 PROCEDURE — 93000 ELECTROCARDIOGRAM COMPLETE: CPT | Performed by: PHYSICIAN ASSISTANT

## 2024-06-02 PROCEDURE — 3080F DIAST BP >= 90 MM HG: CPT | Performed by: PHYSICIAN ASSISTANT

## 2024-06-02 RX ORDER — LISINOPRIL 20 MG/1
20 TABLET ORAL DAILY
Qty: 30 TABLET | Refills: 2 | Status: SHIPPED | OUTPATIENT
Start: 2024-06-02 | End: 2024-06-28

## 2024-06-02 RX ORDER — ENOXAPARIN SODIUM 100 MG/ML
INJECTION SUBCUTANEOUS
COMMUNITY
Start: 2024-04-11 | End: 2024-06-28

## 2024-06-02 RX ORDER — HYDROXYZINE PAMOATE 25 MG/1
CAPSULE ORAL
COMMUNITY
Start: 2024-04-11 | End: 2024-06-28

## 2024-06-02 ASSESSMENT — ENCOUNTER SYMPTOMS: ABDOMINAL PAIN: 1

## 2024-06-02 ASSESSMENT — FIBROSIS 4 INDEX: FIB4 SCORE: 0.63

## 2024-06-02 NOTE — LETTER
June 2, 2024         Patient: Margareth Blake   YOB: 1970   Date of Visit: 6/2/2024           To Whom it May Concern:    Margareth Blake was seen in my clinic on 6/2/2024. Please excuse any absences from work this week due to acute condition.        If you have any questions or concerns, please don't hesitate to call.        Sincerely,           Janusz Marcus P.A.-C.  Electronically Signed

## 2024-06-02 NOTE — PROGRESS NOTES
Subjective     Margareth Blake is a very pleasant 53 y.o. female who presents with Abdominal Pain (3 yrs ago since gastric bypass. ABD Pain while drinking water. Feeling major pressure midline. Full sensation, no nausea. Needs note for work. Not sleeping due to pain. Regular BM.  Slight low grade fever.  ) and Hypertension (140s over 110s. )            HPI  Epigastric burning abdominal pain with pressure, fullness radiating to the chest.  No nausea, diarrhea, vomiting or constipation.  Pain is keeping her up at night as symptoms are worse after eating and lying down.  She notes that her chest pain is associated with fluctuating blood pressure.  She takes chlorthalidone as directed but it has not been controlling her blood pressure.  She used to be on lisinopril and tolerated it well.  She denies headache, dizziness, blurry vision, fever, leg swelling, calf tenderness or shortness of breath.  She does have a history of GERD, heartburn and dyspepsia with previous gastric bypass.  She has tried an acids but is currently not on a PPI        PMH:  has a past medical history of Asthma, Bipolar 1 disorder, depressed (HCC), Chickenpox, Constipation, Coronary heart disease, Daytime sleepiness, Frequent headaches, Frequent urination, GERD (gastroesophageal reflux disease), Polish measles, Heart burn, Heartburn, High cholesterol (04/08/2021), Hypertension (04/08/2021), Hypothyroidism, Insomnia, Morning headache, Painful joint, Tonsillitis, Toothache, Weakness, Weight loss, and Whooping cough.  MEDS:   Current Outpatient Medications:     enoxaparin (LOVENOX) 40 MG/0.4ML Solution Prefilled Syringe inj, INJECT 0.4ML SUBCUTANEOUSLY ONCE DAILY FOR THREE WEEKS STARTING THE DAY BEFORE SURGERY., Disp: , Rfl:     hydrOXYzine pamoate (VISTARIL) 25 MG Cap, TAKE 1 CAPSULE BY MOUTH EVERY 4 HOURS AS NEEDED FOR NAUSEA AND VOMITING. TAKE 10-20 MINUTES PRIOR TO PAIN MEDICATION., Disp: , Rfl:     lisinopril (PRINIVIL) 20 MG Tab, Take 1  Tablet by mouth every day., Disp: 30 Tablet, Rfl: 2    atomoxetine (STRATTERA) 18 MG capsule, Take 1 capsule by mouth every morning., Disp: 90 Capsule, Rfl: 3    ondansetron (ZOFRAN ODT) 8 MG TABLET DISPERSIBLE, DISSOLVE 1 TABLET IN MOUTH EVERY 12 HOURS AS NEEDED FOR NAUSEA FOR VOMITING, Disp: 15 Tablet, Rfl: 3    Evolocumab (REPATHA SURECLICK) 140 MG/ML Solution Auto-injector SubQ injection pen, Inject 1 mL under the skin every 14 days., Disp: 2 mL, Rfl: 3    metFORMIN (GLUCOPHAGE) 500 MG Tab, Take 1 tablet by mouth 2 times a day with meals., Disp: 180 Tablet, Rfl: 3    traZODone (DESYREL) 50 MG Tab, Take 1 tablet by mouth every evening., Disp: 90 Tablet, Rfl: 3    omeprazole (PRILOSEC) 40 MG delayed-release capsule, Take 1 capsule by mouth every day., Disp: 90 Capsule, Rfl: 3    chlorthalidone (HYGROTON) 25 MG Tab, Take 1 tablet by mouth every day., Disp: 90 Tablet, Rfl: 3    lamotrigine (LAMICTAL) 150 MG tablet, Take 1 Tablet by mouth every day., Disp: 90 Tablet, Rfl: 3    albuterol 108 (90 Base) MCG/ACT Aero Soln inhalation aerosol, Inhale 2 Puffs every 6 hours as needed for Shortness of Breath., Disp: 8.5 g, Rfl: 5  ALLERGIES:   Allergies   Allergen Reactions    Atorvastatin Myalgia    Dust Mite Extract Unspecified     Other reaction(s): Unknown  Other reaction(s): Unknown    Molds & Smuts Unspecified    Penicillins Hives and Unspecified     rash     SURGHX:   Past Surgical History:   Procedure Laterality Date    VT LAP, CORINNE RESTRICT PROC, LONGITUDINAL GAS*  04/16/2021    Procedure: GASTRECTOMY, SLEEVE, LAPAROSCOPIC;  Surgeon: Houston Pickett M.D.;  Location: SURGERY [facility];  Service: General    VT UNLISTED LAPAROSCOPIC PROC, LIVER  04/16/2021    Procedure: BIOPSY, LIVER, LAPAROSCOPIC.;  Surgeon: Houston Pickett M.D.;  Location: SURGERY [facility];  Service: General    HYSTERECTOMY LAPAROSCOPY      HYSTERECTOMY, TOTAL ABDOMINAL      SLEEVE,SOPHIE VASO THIGH       SOCHX:  reports that she quit smoking about 9  "years ago. Her smoking use included cigarettes. She started smoking about 34 years ago. She has never used smokeless tobacco. She reports current drug use. Drug: Marijuana. She reports that she does not drink alcohol.  FH: family history is not on file.      Review of Systems   Constitutional: Negative.    HENT: Negative.     Eyes: Negative.    Respiratory: Negative.     Cardiovascular:  Positive for chest pain. Negative for palpitations and leg swelling.   Gastrointestinal:  Positive for abdominal pain and heartburn. Negative for blood in stool, constipation, diarrhea, melena, nausea and vomiting.   Genitourinary: Negative.    Musculoskeletal: Negative.    Skin: Negative.    Neurological: Negative.        Medications, Allergies, and current problem list reviewed today in Epic             Objective     BP (!) 154/122   Pulse 91   Temp 36.2 °C (97.1 °F) (Temporal)   Resp 18   Ht 1.575 m (5' 2\")   Wt 73.5 kg (162 lb)   SpO2 98%   BMI 29.63 kg/m²      Physical Exam  Vitals and nursing note reviewed.   Constitutional:       General: She is not in acute distress.     Appearance: Normal appearance. She is well-developed. She is not ill-appearing, toxic-appearing or diaphoretic.   HENT:      Head: Normocephalic and atraumatic.      Right Ear: Tympanic membrane, ear canal and external ear normal.      Left Ear: Tympanic membrane, ear canal and external ear normal.      Nose: Nose normal. No congestion or rhinorrhea.      Mouth/Throat:      Mouth: Mucous membranes are moist.      Pharynx: No oropharyngeal exudate or posterior oropharyngeal erythema.   Eyes:      General:         Right eye: No discharge.         Left eye: No discharge.      Conjunctiva/sclera: Conjunctivae normal.   Cardiovascular:      Rate and Rhythm: Normal rate and regular rhythm.      Pulses: Normal pulses.      Heart sounds: Normal heart sounds.   Pulmonary:      Effort: Pulmonary effort is normal. No respiratory distress.      Breath sounds: " Normal breath sounds. No wheezing, rhonchi or rales.   Abdominal:      General: Abdomen is flat. Bowel sounds are normal. There is no distension.      Palpations: Abdomen is soft. There is no mass or pulsatile mass.      Tenderness: There is abdominal tenderness in the epigastric area. There is no right CVA tenderness, left CVA tenderness, guarding or rebound. Negative signs include McBurney's sign.      Hernia: No hernia is present.       Musculoskeletal:      Cervical back: Normal range of motion and neck supple. No rigidity or tenderness.      Right lower leg: No edema.      Left lower leg: No edema.   Lymphadenopathy:      Cervical: No cervical adenopathy.   Skin:     General: Skin is warm and dry.      Findings: No rash.   Neurological:      General: No focal deficit present.      Mental Status: She is alert and oriented to person, place, and time. Mental status is at baseline.   Psychiatric:         Mood and Affect: Mood normal.         Behavior: Behavior normal.         Thought Content: Thought content normal.         Judgment: Judgment normal.                             Assessment & Plan     This is a very pleasant 53-year-old female presenting with burning epigastric discomfort radiating into the chest.  She is eliciting chest pain and notes her blood pressure has been erratic and fluctuating in.  She does have a history of GERD, dyspepsia and heartburn with previous gastric bypass.  She denies nausea, vomiting, diarrhea, constipation or bloody stools.  No UTI symptoms.  Symptoms are worse after eating and with lying down causing poor sleep.  She denies headache, dizziness, blurry vision, shortness of breath, leg swelling or calf tenderness.  Elevated BP noted in clinic, rechecked by me and found to be 154/110.  Exam  shows epigastric discomfort to TTP without rebound or guarding.  No McBurney's point tenderness.  No pulsatile mass.  CTA bilaterally.  No lower leg swelling.  Remainder of exam is  reassuring.  Urinalysis negative and EKG within normal limits.  Patient given GI cocktail in clinic for dyspepsia with limited relief.  She will continue dietary restrictions, OTC antiacids and her PPI.  Patient has been taking her chlorthalidone as prescribed for hypertension but notes her blood pressure has been erratic and fluctuating.  Previously on lisinopril and tolerated well.  Patient elects to restart this medication today.  She does have follow-up with her PCP on 6/28/2024.  Recommended blood pressure journal.  To ER immediately for any worsening or changing symptoms.  Red flag symptoms pertaining to acute cardiopulmonary etiology discussed at length.      1. Burning in the chest  EKG - Clinic Performed      2. Abdominal pain in female  POCT Urinalysis      3. Dyspepsia        4. Essential hypertension  lisinopril (PRINIVIL) 20 MG Tab          I personally reviewed prior external notes and test results pertinent to today's visit. Return to clinic or go to ED if symptoms worsen or persist. Red flag symptoms and indications for ED discussed at length. Patient/Parent/Guardian voices understanding.  AVS with post-visit instructions printed and provided or given verbally.  Follow-up with your primary care provider in 3-5 days. All side effects and potential interactions of prescribed medication discussed including allergic response, GI upset, tendon injury, rash, sedation, OCP effectiveness, etc.    Please note that this dictation was created using voice recognition software. I have made every reasonable attempt to correct obvious errors, but I expect that there are errors of grammar and possibly content that I did not discover before finalizing the note.

## 2024-06-03 ENCOUNTER — TELEPHONE (OUTPATIENT)
Dept: URGENT CARE | Facility: PHYSICIAN GROUP | Age: 54
End: 2024-06-03
Payer: COMMERCIAL

## 2024-06-06 ASSESSMENT — ENCOUNTER SYMPTOMS
MUSCULOSKELETAL NEGATIVE: 1
DIARRHEA: 0
BLOOD IN STOOL: 0
RESPIRATORY NEGATIVE: 1
PALPITATIONS: 0
EYES NEGATIVE: 1
HEARTBURN: 1
NEUROLOGICAL NEGATIVE: 1
CONSTITUTIONAL NEGATIVE: 1
NAUSEA: 0
CONSTIPATION: 0
VOMITING: 0

## 2024-06-24 ENCOUNTER — PHARMACY VISIT (OUTPATIENT)
Dept: PHARMACY | Facility: MEDICAL CENTER | Age: 54
End: 2024-06-24
Payer: MEDICARE

## 2024-06-24 PROCEDURE — RXMED WILLOW AMBULATORY MEDICATION CHARGE: Performed by: FAMILY MEDICINE

## 2024-06-26 ENCOUNTER — HOSPITAL ENCOUNTER (OUTPATIENT)
Dept: LAB | Facility: MEDICAL CENTER | Age: 54
End: 2024-06-26
Attending: NURSE PRACTITIONER
Payer: COMMERCIAL

## 2024-06-26 ENCOUNTER — HOSPITAL ENCOUNTER (OUTPATIENT)
Dept: LAB | Facility: MEDICAL CENTER | Age: 54
End: 2024-06-26
Attending: FAMILY MEDICINE
Payer: COMMERCIAL

## 2024-06-26 ENCOUNTER — DOCUMENTATION (OUTPATIENT)
Dept: VASCULAR LAB | Facility: MEDICAL CENTER | Age: 54
End: 2024-06-26
Payer: COMMERCIAL

## 2024-06-26 DIAGNOSIS — D45 POLYCYTHEMIA VERA (HCC): ICD-10-CM

## 2024-06-26 DIAGNOSIS — E78.2 MIXED HYPERLIPIDEMIA: ICD-10-CM

## 2024-06-26 LAB
ALBUMIN SERPL BCP-MCNC: 4.4 G/DL (ref 3.2–4.9)
ALBUMIN SERPL BCP-MCNC: 4.4 G/DL (ref 3.2–4.9)
ALBUMIN/GLOB SERPL: 1.5 G/DL
ALBUMIN/GLOB SERPL: 1.5 G/DL
ALP SERPL-CCNC: 56 U/L (ref 30–99)
ALP SERPL-CCNC: 57 U/L (ref 30–99)
ALT SERPL-CCNC: 16 U/L (ref 2–50)
ALT SERPL-CCNC: 16 U/L (ref 2–50)
ANION GAP SERPL CALC-SCNC: 13 MMOL/L (ref 7–16)
ANION GAP SERPL CALC-SCNC: 14 MMOL/L (ref 7–16)
AST SERPL-CCNC: 17 U/L (ref 12–45)
AST SERPL-CCNC: 17 U/L (ref 12–45)
BASOPHILS # BLD AUTO: 0.5 % (ref 0–1.8)
BASOPHILS # BLD AUTO: 0.5 % (ref 0–1.8)
BASOPHILS # BLD: 0.04 K/UL (ref 0–0.12)
BASOPHILS # BLD: 0.04 K/UL (ref 0–0.12)
BILIRUB SERPL-MCNC: 0.5 MG/DL (ref 0.1–1.5)
BILIRUB SERPL-MCNC: 0.5 MG/DL (ref 0.1–1.5)
BUN SERPL-MCNC: 11 MG/DL (ref 8–22)
BUN SERPL-MCNC: 11 MG/DL (ref 8–22)
CALCIUM ALBUM COR SERPL-MCNC: 9.4 MG/DL (ref 8.5–10.5)
CALCIUM ALBUM COR SERPL-MCNC: 9.5 MG/DL (ref 8.5–10.5)
CALCIUM SERPL-MCNC: 9.7 MG/DL (ref 8.5–10.5)
CALCIUM SERPL-MCNC: 9.8 MG/DL (ref 8.5–10.5)
CHLORIDE SERPL-SCNC: 100 MMOL/L (ref 96–112)
CHLORIDE SERPL-SCNC: 100 MMOL/L (ref 96–112)
CHOLEST SERPL-MCNC: 251 MG/DL (ref 100–199)
CO2 SERPL-SCNC: 23 MMOL/L (ref 20–33)
CO2 SERPL-SCNC: 24 MMOL/L (ref 20–33)
CREAT SERPL-MCNC: 0.74 MG/DL (ref 0.5–1.4)
CREAT SERPL-MCNC: 0.74 MG/DL (ref 0.5–1.4)
EOSINOPHIL # BLD AUTO: 0.13 K/UL (ref 0–0.51)
EOSINOPHIL # BLD AUTO: 0.13 K/UL (ref 0–0.51)
EOSINOPHIL NFR BLD: 1.5 % (ref 0–6.9)
EOSINOPHIL NFR BLD: 1.5 % (ref 0–6.9)
ERYTHROCYTE [DISTWIDTH] IN BLOOD BY AUTOMATED COUNT: 44.3 FL (ref 35.9–50)
ERYTHROCYTE [DISTWIDTH] IN BLOOD BY AUTOMATED COUNT: 44.4 FL (ref 35.9–50)
FASTING STATUS PATIENT QL REPORTED: NORMAL
FASTING STATUS PATIENT QL REPORTED: NORMAL
FERRITIN SERPL-MCNC: 15.2 NG/ML (ref 10–291)
GFR SERPLBLD CREATININE-BSD FMLA CKD-EPI: 97 ML/MIN/1.73 M 2
GFR SERPLBLD CREATININE-BSD FMLA CKD-EPI: 97 ML/MIN/1.73 M 2
GLOBULIN SER CALC-MCNC: 2.9 G/DL (ref 1.9–3.5)
GLOBULIN SER CALC-MCNC: 3 G/DL (ref 1.9–3.5)
GLUCOSE SERPL-MCNC: 108 MG/DL (ref 65–99)
GLUCOSE SERPL-MCNC: 110 MG/DL (ref 65–99)
HCT VFR BLD AUTO: 45.7 % (ref 37–47)
HCT VFR BLD AUTO: 46.3 % (ref 37–47)
HDLC SERPL-MCNC: 57 MG/DL
HGB BLD-MCNC: 15.6 G/DL (ref 12–16)
HGB BLD-MCNC: 15.6 G/DL (ref 12–16)
IMM GRANULOCYTES # BLD AUTO: 0.02 K/UL (ref 0–0.11)
IMM GRANULOCYTES # BLD AUTO: 0.03 K/UL (ref 0–0.11)
IMM GRANULOCYTES NFR BLD AUTO: 0.2 % (ref 0–0.9)
IMM GRANULOCYTES NFR BLD AUTO: 0.3 % (ref 0–0.9)
IRON SATN MFR SERPL: 26 % (ref 15–55)
IRON SERPL-MCNC: 86 UG/DL (ref 40–170)
LDLC SERPL CALC-MCNC: 127 MG/DL
LYMPHOCYTES # BLD AUTO: 2.32 K/UL (ref 1–4.8)
LYMPHOCYTES # BLD AUTO: 2.34 K/UL (ref 1–4.8)
LYMPHOCYTES NFR BLD: 27.1 % (ref 22–41)
LYMPHOCYTES NFR BLD: 27.1 % (ref 22–41)
MCH RBC QN AUTO: 31.5 PG (ref 27–33)
MCH RBC QN AUTO: 32 PG (ref 27–33)
MCHC RBC AUTO-ENTMCNC: 33.7 G/DL (ref 32.2–35.5)
MCHC RBC AUTO-ENTMCNC: 34.1 G/DL (ref 32.2–35.5)
MCV RBC AUTO: 93.3 FL (ref 81.4–97.8)
MCV RBC AUTO: 93.6 FL (ref 81.4–97.8)
MONOCYTES # BLD AUTO: 0.63 K/UL (ref 0–0.85)
MONOCYTES # BLD AUTO: 0.68 K/UL (ref 0–0.85)
MONOCYTES NFR BLD AUTO: 7.4 % (ref 0–13.4)
MONOCYTES NFR BLD AUTO: 7.9 % (ref 0–13.4)
NEUTROPHILS # BLD AUTO: 5.41 K/UL (ref 1.82–7.42)
NEUTROPHILS # BLD AUTO: 5.42 K/UL (ref 1.82–7.42)
NEUTROPHILS NFR BLD: 62.7 % (ref 44–72)
NEUTROPHILS NFR BLD: 63.3 % (ref 44–72)
NRBC # BLD AUTO: 0 K/UL
NRBC # BLD AUTO: 0 K/UL
NRBC BLD-RTO: 0 /100 WBC (ref 0–0.2)
NRBC BLD-RTO: 0 /100 WBC (ref 0–0.2)
PLATELET # BLD AUTO: 413 K/UL (ref 164–446)
PLATELET # BLD AUTO: 422 K/UL (ref 164–446)
PMV BLD AUTO: 10.1 FL (ref 9–12.9)
PMV BLD AUTO: 10.2 FL (ref 9–12.9)
POTASSIUM SERPL-SCNC: 3.5 MMOL/L (ref 3.6–5.5)
POTASSIUM SERPL-SCNC: 3.5 MMOL/L (ref 3.6–5.5)
PROT SERPL-MCNC: 7.3 G/DL (ref 6–8.2)
PROT SERPL-MCNC: 7.4 G/DL (ref 6–8.2)
RBC # BLD AUTO: 4.88 M/UL (ref 4.2–5.4)
RBC # BLD AUTO: 4.96 M/UL (ref 4.2–5.4)
SODIUM SERPL-SCNC: 137 MMOL/L (ref 135–145)
SODIUM SERPL-SCNC: 137 MMOL/L (ref 135–145)
TIBC SERPL-MCNC: 330 UG/DL (ref 250–450)
TRIGL SERPL-MCNC: 337 MG/DL (ref 0–149)
UIBC SERPL-MCNC: 244 UG/DL (ref 110–370)
WBC # BLD AUTO: 8.6 K/UL (ref 4.8–10.8)
WBC # BLD AUTO: 8.6 K/UL (ref 4.8–10.8)

## 2024-06-26 PROCEDURE — 80061 LIPID PANEL: CPT

## 2024-06-26 PROCEDURE — 82728 ASSAY OF FERRITIN: CPT

## 2024-06-26 PROCEDURE — 85025 COMPLETE CBC W/AUTO DIFF WBC: CPT | Mod: 91

## 2024-06-26 PROCEDURE — 80053 COMPREHEN METABOLIC PANEL: CPT

## 2024-06-26 PROCEDURE — 83540 ASSAY OF IRON: CPT

## 2024-06-26 PROCEDURE — 85025 COMPLETE CBC W/AUTO DIFF WBC: CPT

## 2024-06-26 PROCEDURE — 80053 COMPREHEN METABOLIC PANEL: CPT | Mod: 91

## 2024-06-26 PROCEDURE — 83550 IRON BINDING TEST: CPT

## 2024-06-26 PROCEDURE — 36415 COLL VENOUS BLD VENIPUNCTURE: CPT

## 2024-06-26 NOTE — PROGRESS NOTES
Pt is overdue for a Pharmacotherapy follow up visit    Pt last seen in 2022.     Pt has not responded to multiple attempts/calls to establish care. Will discharge from [facility].    Karine Soria, PharmD

## 2024-06-28 ENCOUNTER — OFFICE VISIT (OUTPATIENT)
Dept: MEDICAL GROUP | Facility: PHYSICIAN GROUP | Age: 54
End: 2024-06-28
Payer: COMMERCIAL

## 2024-06-28 VITALS
SYSTOLIC BLOOD PRESSURE: 120 MMHG | BODY MASS INDEX: 28.88 KG/M2 | OXYGEN SATURATION: 96 % | HEIGHT: 63 IN | DIASTOLIC BLOOD PRESSURE: 70 MMHG | TEMPERATURE: 97.4 F | RESPIRATION RATE: 14 BRPM | WEIGHT: 163 LBS | HEART RATE: 96 BPM

## 2024-06-28 DIAGNOSIS — R73.01 ELEVATED FASTING GLUCOSE: ICD-10-CM

## 2024-06-28 DIAGNOSIS — E78.2 MIXED HYPERLIPIDEMIA: ICD-10-CM

## 2024-06-28 DIAGNOSIS — E87.6 HYPOKALEMIA: ICD-10-CM

## 2024-06-28 DIAGNOSIS — Z12.31 ENCOUNTER FOR SCREENING MAMMOGRAM FOR MALIGNANT NEOPLASM OF BREAST: ICD-10-CM

## 2024-06-28 DIAGNOSIS — Z23 NEED FOR VACCINATION: ICD-10-CM

## 2024-06-28 DIAGNOSIS — I10 ESSENTIAL HYPERTENSION: ICD-10-CM

## 2024-06-28 PROCEDURE — 99214 OFFICE O/P EST MOD 30 MIN: CPT | Mod: 25 | Performed by: FAMILY MEDICINE

## 2024-06-28 PROCEDURE — 90746 HEPB VACCINE 3 DOSE ADULT IM: CPT | Performed by: FAMILY MEDICINE

## 2024-06-28 PROCEDURE — 3078F DIAST BP <80 MM HG: CPT | Performed by: FAMILY MEDICINE

## 2024-06-28 PROCEDURE — 90471 IMMUNIZATION ADMIN: CPT | Performed by: FAMILY MEDICINE

## 2024-06-28 PROCEDURE — 3074F SYST BP LT 130 MM HG: CPT | Performed by: FAMILY MEDICINE

## 2024-06-28 ASSESSMENT — FIBROSIS 4 INDEX: FIB4 SCORE: 0.51

## 2024-06-28 NOTE — PROGRESS NOTES
Subjective:   Margareth Blake is a 53 y.o. female here today for evaluation and management of:     Essential hypertension  She was in UC with epigastric discomfort, hx of gastric sleeve years ago. EKG was normal BP was 154/122  In addition to her chlorthalidone, she was started on lisinopril 20 mg and had a severe allergic reaction to it, itching all over, swelling of her mouth, trouble swallowing. Was treated in the ER and symptoms resolved since then.   Had a bone infection after foot surgery this may have been the cause of the high bp.   Since ER visit with allergic reaction to lisinopril. BP has been well controlled, no more epigastric pain.   Continue for now with chlorthalidone 25 mg daily.          Current medicines (including changes today)  Current Outpatient Medications   Medication Sig Dispense Refill    atomoxetine (STRATTERA) 18 MG capsule Take 1 capsule by mouth every morning. 90 Capsule 3    ondansetron (ZOFRAN ODT) 8 MG TABLET DISPERSIBLE DISSOLVE 1 TABLET IN MOUTH EVERY 12 HOURS AS NEEDED FOR NAUSEA FOR VOMITING 15 Tablet 3    Evolocumab (REPATHA SURECLICK) 140 MG/ML Solution Auto-injector SubQ injection pen Inject 1 mL under the skin every 14 days. 2 mL 3    metFORMIN (GLUCOPHAGE) 500 MG Tab Take 1 tablet by mouth 2 times a day with meals. 180 Tablet 3    traZODone (DESYREL) 50 MG Tab Take 1 tablet by mouth every evening. 90 Tablet 3    omeprazole (PRILOSEC) 40 MG delayed-release capsule Take 1 capsule by mouth every day. 90 Capsule 3    chlorthalidone (HYGROTON) 25 MG Tab Take 1 tablet by mouth every day. 90 Tablet 3    lamotrigine (LAMICTAL) 150 MG tablet Take 1 Tablet by mouth every day. 90 Tablet 3    albuterol 108 (90 Base) MCG/ACT Aero Soln inhalation aerosol Inhale 2 Puffs every 6 hours as needed for Shortness of Breath. 8.5 g 5     No current facility-administered medications for this visit.     She  has a past medical history of Asthma, Bipolar 1 disorder, depressed (HCC),  "Chickenpox, Constipation, Coronary heart disease, Daytime sleepiness, Frequent headaches, Frequent urination, GERD (gastroesophageal reflux disease), Nepali measles, Heart burn, Heartburn, High cholesterol (04/08/2021), Hypertension (04/08/2021), Hypothyroidism, Insomnia, Morning headache, Painful joint, Tonsillitis, Toothache, Weakness, Weight loss, and Whooping cough.    ROS  No chest pain, no shortness of breath, no abdominal pain       Objective:     /70   Pulse 96   Temp 36.3 °C (97.4 °F) (Temporal)   Resp 14   Ht 1.588 m (5' 2.5\")   Wt 73.9 kg (163 lb)   SpO2 96%  Body mass index is 29.34 kg/m².   Physical Exam:  Constitutional: Alert, no distress.  Skin: Warm, dry, good turgor, no rashes in visible areas.  Eye: Equal, round and reactive, conjunctiva clear, lids normal.  ENMT: Lips without lesions, good dentition, oropharynx clear.  Neck: Trachea midline, no masses, no thyromegaly. No cervical or supraclavicular lymphadenopathy  Respiratory: Unlabored respiratory effort, lungs clear to auscultation, no wheezes, no ronchi.  Cardiovascular: Normal S1, S2, no murmur, no edema.  Abdomen: Soft, non-tender, no masses, no hepatosplenomegaly.  Psych: Alert and oriented x3, normal affect and mood.    Assessment and Plan:   The following treatment plan was discussed    1. Need for vaccination  - Hepatitis B Vaccine Adult 20+    2. Essential hypertension    3. Mixed hyperlipidemia  - Lipid Profile; Future    4. Elevated fasting glucose  - HEMOGLOBIN A1C; Future    5. Hypokalemia  - Renal Function Panel; Future    6. Encounter for screening mammogram for malignant neoplasm of breast  - MA-SCREENING MAMMO BILAT W/TOMOSYNTHESIS W/CAD; Future      Followup: No follow-ups on file.           "

## 2024-06-28 NOTE — ASSESSMENT & PLAN NOTE
She was in UC with epigastric discomfort, hx of gastric sleeve years ago. EKG was normal BP was 154/122  In addition to her chlorthalidone, she was started on lisinopril 20 mg and had a severe allergic reaction to it, itching all over, swelling of her mouth, trouble swallowing. Was treated in the ER and symptoms resolved since then.   Had a bone infection after foot surgery this may have been the cause of the high bp.   Since ER visit with allergic reaction to lisinopril. BP has been well controlled, no more epigastric pain.   Continue for now with chlorthalidone 25 mg daily.

## 2024-07-16 PROCEDURE — RXMED WILLOW AMBULATORY MEDICATION CHARGE: Performed by: FAMILY MEDICINE

## 2024-07-16 RX ORDER — EVOLOCUMAB 140 MG/ML
140 INJECTION, SOLUTION SUBCUTANEOUS
Qty: 2 ML | Refills: 3 | Status: SHIPPED | OUTPATIENT
Start: 2024-07-16

## 2024-07-16 RX ORDER — CHLORTHALIDONE 25 MG/1
25 TABLET ORAL DAILY
Qty: 90 TABLET | Refills: 3 | Status: SHIPPED | OUTPATIENT
Start: 2024-07-16

## 2024-07-16 RX ORDER — OMEPRAZOLE 40 MG/1
40 CAPSULE, DELAYED RELEASE ORAL DAILY
Qty: 90 CAPSULE | Refills: 3 | Status: SHIPPED | OUTPATIENT
Start: 2024-07-16

## 2024-07-17 ENCOUNTER — PHARMACY VISIT (OUTPATIENT)
Dept: PHARMACY | Facility: MEDICAL CENTER | Age: 54
End: 2024-07-17
Payer: MEDICARE

## 2024-07-23 RX ORDER — ONDANSETRON 8 MG/1
TABLET, ORALLY DISINTEGRATING ORAL
Qty: 15 TABLET | Refills: 3 | Status: SHIPPED | OUTPATIENT
Start: 2024-07-23

## 2024-07-24 ENCOUNTER — TELEPHONE (OUTPATIENT)
Dept: MEDICAL GROUP | Facility: PHYSICIAN GROUP | Age: 54
End: 2024-07-24
Payer: COMMERCIAL

## 2024-08-01 ENCOUNTER — HOSPITAL ENCOUNTER (OUTPATIENT)
Dept: HEMATOLOGY ONCOLOGY | Facility: MEDICAL CENTER | Age: 54
End: 2024-08-01
Attending: NURSE PRACTITIONER
Payer: COMMERCIAL

## 2024-08-01 ENCOUNTER — APPOINTMENT (OUTPATIENT)
Dept: MEDICAL GROUP | Facility: PHYSICIAN GROUP | Age: 54
End: 2024-08-01
Payer: COMMERCIAL

## 2024-08-01 DIAGNOSIS — D45 POLYCYTHEMIA VERA (HCC): ICD-10-CM

## 2024-08-01 DIAGNOSIS — Z09 ENCOUNTER FOR HEMATOLOGY FOLLOW-UP: ICD-10-CM

## 2024-08-01 PROCEDURE — 99213 OFFICE O/P EST LOW 20 MIN: CPT | Mod: 95 | Performed by: NURSE PRACTITIONER

## 2024-08-01 ASSESSMENT — ENCOUNTER SYMPTOMS
WEIGHT LOSS: 0
COUGH: 0
CHILLS: 0
NAUSEA: 0
DIARRHEA: 0
HEADACHES: 0
ABDOMINAL PAIN: 0
SHORTNESS OF BREATH: 0
TINGLING: 0
WHEEZING: 0
DIZZINESS: 0
PALPITATIONS: 0
CONSTIPATION: 1
FEVER: 0
INSOMNIA: 0
VOMITING: 0
MYALGIAS: 0

## 2024-08-01 NOTE — PROGRESS NOTES
Subjective     Margareth Blake is a 53 y.o. female who presents with Follow-Up (Rosado/ Randall/ NINA-2 gene mutation/fv)            HPI  Ms. Blake presents for surveillance evaluation of polycythemia vera: JAK2 positive; normal erythropoietin. Visit is conducted as an on demand video visit using Zoom and patient is unaccompanied today.     Patient was referred for hematology evaluation of elevated H/H (polycythemia). In January 2022 erythropoietin was normal; JAK2 low-level positive positive. She has history of gastric sleeve. Patient proceeded with surveillance with goal to keep hemoglobin less than 18.5. She initiated therapeutic phlebotomies in 12/2022, approximately monthly with frequency since decreased. Last completed 9/22/23.     Patient continues with her usual activity, she takes 'passion for life 135' (daily supplement).  Intermittent constipation is well-managed with turkey rhubarb or softeners.  She continues to recover from foot surgeries, with limited activity.  She is otherwise at her baseline.        Review of Systems   Constitutional:  Negative for chills, fever, malaise/fatigue and weight loss (stable).   Respiratory:  Negative for cough, shortness of breath and wheezing.    Cardiovascular:  Negative for chest pain, palpitations and leg swelling.   Gastrointestinal:  Positive for constipation (consistent with baseline; turkey rhubarb, softeners PRN). Negative for abdominal pain, diarrhea, nausea and vomiting.   Genitourinary:  Negative for dysuria.   Musculoskeletal:  Negative for joint pain and myalgias.        Recovered now from 2 foot surgery   Neurological:  Negative for dizziness, tingling and headaches.   Psychiatric/Behavioral:  The patient does not have insomnia.      Allergies   Allergen Reactions    Atorvastatin Myalgia    Dust Mite Extract Unspecified     Other reaction(s): Unknown  Other reaction(s): Unknown    Lisinopril Anaphylaxis    Molds & Smuts Unspecified    Penicillins  Hives and Unspecified     rash       Current Outpatient Medications on File Prior to Encounter   Medication Sig Dispense Refill    ondansetron (ZOFRAN ODT) 8 MG TABLET DISPERSIBLE DISSOLVE 1 TABLET IN MOUTH EVERY 12 HOURS AS NEEDED FOR NAUSEA FOR VOMITING 15 Tablet 3    Evolocumab (REPATHA SURECLICK) 140 MG/ML Solution Auto-injector SubQ injection pen Inject 1 mL under the skin every 14 days. 2 mL 3    omeprazole (PRILOSEC) 40 MG delayed-release capsule Take 1 capsule by mouth every day. 90 Capsule 3    chlorthalidone (HYGROTON) 25 MG Tab Take 1 tablet by mouth every day. 90 Tablet 3    atomoxetine (STRATTERA) 18 MG capsule Take 1 capsule by mouth every morning. 90 Capsule 3    metFORMIN (GLUCOPHAGE) 500 MG Tab Take 1 tablet by mouth 2 times a day with meals. 180 Tablet 3    traZODone (DESYREL) 50 MG Tab Take 1 tablet by mouth every evening. 90 Tablet 3    lamotrigine (LAMICTAL) 150 MG tablet Take 1 Tablet by mouth every day. 90 Tablet 3    albuterol 108 (90 Base) MCG/ACT Aero Soln inhalation aerosol Inhale 2 Puffs every 6 hours as needed for Shortness of Breath. 8.5 g 5     No current facility-administered medications on file prior to encounter.              Objective     There were no vitals taken for this visit.     Physical Exam  Constitutional:       General: She is not in acute distress.     Appearance: Normal appearance. She is not ill-appearing.   Pulmonary:      Effort: Pulmonary effort is normal.   Skin:     Coloration: Skin is not jaundiced or pale.   Neurological:      Mental Status: She is alert and oriented to person, place, and time. Mental status is at baseline.   Psychiatric:         Mood and Affect: Mood normal.           Latest Reference Range & Units 06/26/24 11:07 06/26/24 11:08   WBC 4.8 - 10.8 K/uL 8.6 8.6   RBC 4.20 - 5.40 M/uL 4.88 4.96   Hemoglobin 12.0 - 16.0 g/dL 15.6 15.6   Hematocrit 37.0 - 47.0 % 45.7 46.3   MCV 81.4 - 97.8 fL 93.6 93.3   MCH 27.0 - 33.0 pg 32.0 31.5   MCHC 32.2 - 35.5  g/dL 34.1 33.7   RDW 35.9 - 50.0 fL 44.4 44.3   Platelet Count 164 - 446 K/uL 413 422   MPV 9.0 - 12.9 fL 10.2 10.1   Neutrophils-Polys 44.00 - 72.00 % 62.70 63.30   Neutrophils (Absolute) 1.82 - 7.42 K/uL 5.41 5.42   Lymphocytes 22.00 - 41.00 % 27.10 27.10   Lymphs (Absolute) 1.00 - 4.80 K/uL 2.34 2.32   Monocytes 0.00 - 13.40 % 7.90 7.40   Monos (Absolute) 0.00 - 0.85 K/uL 0.68 0.63   Eosinophils 0.00 - 6.90 % 1.50 1.50   Eos (Absolute) 0.00 - 0.51 K/uL 0.13 0.13   Basophils 0.00 - 1.80 % 0.50 0.50   Baso (Absolute) 0.00 - 0.12 K/uL 0.04 0.04   Immature Granulocytes 0.00 - 0.90 % 0.30 0.20   Immature Granulocytes (abs) 0.00 - 0.11 K/uL 0.03 0.02   Nucleated RBC 0.00 - 0.20 /100 WBC 0.00 0.00   NRBC (Absolute) K/uL 0.00 0.00   Sodium 135 - 145 mmol/L 137 137   Potassium 3.6 - 5.5 mmol/L 3.5 (L) 3.5 (L)   Chloride 96 - 112 mmol/L 100 100   Co2 20 - 33 mmol/L 24 23   Anion Gap 7.0 - 16.0  13.0 14.0   Glucose 65 - 99 mg/dL 108 (H) 110 (H)   Bun 8 - 22 mg/dL 11 11   Creatinine 0.50 - 1.40 mg/dL 0.74 0.74   GFR (CKD-EPI) >60 mL/min/1.73 m 2 97 97   Calcium 8.5 - 10.5 mg/dL 9.7 9.8   Correct Calcium 8.5 - 10.5 mg/dL 9.4 9.5   AST(SGOT) 12 - 45 U/L 17 17   ALT(SGPT) 2 - 50 U/L 16 16   Alkaline Phosphatase 30 - 99 U/L 57 56   Total Bilirubin 0.1 - 1.5 mg/dL 0.5 0.5   Albumin 3.2 - 4.9 g/dL 4.4 4.4   Total Protein 6.0 - 8.2 g/dL 7.3 7.4   Globulin 1.9 - 3.5 g/dL 2.9 3.0   A-G Ratio g/dL 1.5 1.5   Iron 40 - 170 ug/dL 86    Total Iron Binding 250 - 450 ug/dL 330    % Saturation 15 - 55 % 26    Unsat Iron Binding 110 - 370 ug/dL 244    Fasting Status  Fasting Fasting   Cholesterol,Tot 100 - 199 mg/dL  251 (H)   Triglycerides 0 - 149 mg/dL  337 (H)   HDL >=40 mg/dL  57   LDL <100 mg/dL  127 (H)   Ferritin 10.0 - 291.0 ng/mL 15.2    (L): Data is abnormally low  (H): Data is abnormally high                   Assessment & Plan     1. Polycythemia vera (HCC)  CBC WITH DIFFERENTIAL    IRON/TOTAL IRON BIND    FERRITIN      2.  Encounter for hematology follow-up            1.  PCV: Diagnosed 1/2022; initially managed w/ TP, last done 9/22/23    Patient is overall fatigue has been well-managed with daily supplement ('passion for life 135'), she feels better without consistent TP. CBC, CMP, TIBC, ferritin have all been evaluated and found to be within acceptable limits. Future TP will be completed only PRN, rather than scheduled intervals. Patient will continue to monitor symptoms, repeat labs every 6 months, and return in 1 year for reevaluation, sooner as needed.        The patient verbalized agreement and understanding of current plan. All questions and concerns were addressed at time of visit.    Please note that this dictation was created using voice recognition software. I have made every reasonable attempt to correct obvious errors, but I expect that there are errors of grammar and possibly content that I did not discover before finalizing the note.       This evaluation was conducted via Teams using secure and encrypted videoconferencing technology. The patient was in their home in the [facility].  The patient's identity was confirmed and verbal consent was obtained for this virtual visit.

## 2024-08-23 ENCOUNTER — APPOINTMENT (OUTPATIENT)
Dept: RADIOLOGY | Facility: MEDICAL CENTER | Age: 54
End: 2024-08-23
Attending: FAMILY MEDICINE
Payer: COMMERCIAL

## 2024-09-08 RX ORDER — LAMOTRIGINE 150 MG/1
150 TABLET ORAL DAILY
Qty: 90 TABLET | Refills: 3 | Status: CANCELLED | OUTPATIENT
Start: 2024-09-08

## 2024-09-09 PROCEDURE — RXMED WILLOW AMBULATORY MEDICATION CHARGE: Performed by: FAMILY MEDICINE

## 2024-09-17 PROCEDURE — RXMED WILLOW AMBULATORY MEDICATION CHARGE: Performed by: FAMILY MEDICINE

## 2024-09-23 PROCEDURE — RXMED WILLOW AMBULATORY MEDICATION CHARGE: Performed by: FAMILY MEDICINE

## 2024-09-24 ENCOUNTER — PHARMACY VISIT (OUTPATIENT)
Dept: PHARMACY | Facility: MEDICAL CENTER | Age: 54
End: 2024-09-24
Payer: MEDICARE

## 2024-09-24 RX ORDER — LAMOTRIGINE 150 MG/1
150 TABLET ORAL DAILY
Qty: 90 TABLET | Refills: 3 | Status: SHIPPED | OUTPATIENT
Start: 2024-09-24

## 2024-09-24 NOTE — TELEPHONE ENCOUNTER
Received request via: Patient    Was the patient seen in the last year in this department? Yes    Does the patient have an active prescription (recently filled or refills available) for medication(s) requested? No    Ph[facility] Name: [facility]    Does the patient have senior living Plus and need 100-day supply? (This applies to ALL medications) Patient does not have SCP

## 2024-09-25 PROCEDURE — RXMED WILLOW AMBULATORY MEDICATION CHARGE: Performed by: FAMILY MEDICINE

## 2024-09-26 ENCOUNTER — PHARMACY VISIT (OUTPATIENT)
Dept: PHARMACY | Facility: MEDICAL CENTER | Age: 54
End: 2024-09-26
Payer: MEDICARE

## 2024-10-11 PROCEDURE — RXMED WILLOW AMBULATORY MEDICATION CHARGE: Performed by: FAMILY MEDICINE

## 2024-10-11 RX ORDER — EVOLOCUMAB 140 MG/ML
140 INJECTION, SOLUTION SUBCUTANEOUS
Qty: 2 ML | Refills: 3 | Status: SHIPPED | OUTPATIENT
Start: 2024-10-11

## 2024-10-11 RX ORDER — TRAZODONE HYDROCHLORIDE 50 MG/1
50 TABLET, FILM COATED ORAL NIGHTLY
Qty: 90 TABLET | Refills: 3 | Status: SHIPPED | OUTPATIENT
Start: 2024-10-11

## 2024-10-22 ENCOUNTER — PHARMACY VISIT (OUTPATIENT)
Dept: PHARMACY | Facility: MEDICAL CENTER | Age: 54
End: 2024-10-22
Payer: MEDICARE

## 2024-12-06 ENCOUNTER — APPOINTMENT (OUTPATIENT)
Dept: MEDICAL GROUP | Facility: PHYSICIAN GROUP | Age: 54
End: 2024-12-06
Payer: COMMERCIAL

## 2025-06-17 RX ORDER — ONDANSETRON 8 MG/1
TABLET, ORALLY DISINTEGRATING ORAL
Qty: 15 TABLET | Refills: 0 | Status: SHIPPED | OUTPATIENT
Start: 2025-06-17

## 2025-06-17 NOTE — TELEPHONE ENCOUNTER
Received request via: Patient    Was the patient seen in the last year in this department? Yes    Does the patient have an active prescription (recently filled or refills available) for medication(s) requested? No    Pharmacy Name: [facility] [place]    Does the patient have residential Plus and need 100-day supply? (This applies to ALL medications) Patient does not have SCP

## 2025-07-22 RX ORDER — OMEPRAZOLE 40 MG/1
40 CAPSULE, DELAYED RELEASE ORAL DAILY
Qty: 90 CAPSULE | Refills: 3 | Status: SHIPPED | OUTPATIENT
Start: 2025-07-22

## 2025-07-22 NOTE — TELEPHONE ENCOUNTER
Received request via: Patient    Was the patient seen in the last year in this department? No. Last seen by you 06/28/24    Does the patient have an active prescription (recently filled or refills available) for medication(s) requested? No    Pharmacy Name: [facility] [place]    Does the patient have long-term Plus and need 100-day supply? (This applies to ALL medications) Patient does not have SCP

## 2025-08-18 ENCOUNTER — SPECIALTY PHARMACY (OUTPATIENT)
Dept: VASCULAR LAB | Facility: MEDICAL CENTER | Age: 55
End: 2025-08-18
Payer: COMMERCIAL

## 2025-08-19 RX ORDER — ONDANSETRON 8 MG/1
TABLET, ORALLY DISINTEGRATING ORAL
Qty: 15 TABLET | Refills: 0 | Status: SHIPPED | OUTPATIENT
Start: 2025-08-19

## (undated) DEVICE — Device

## (undated) DEVICE — SODIUM CHL IRRIGATION 0.9% 1000ML (12EA/CA)

## (undated) DEVICE — KIT ANESTHESIA W/CIRCUIT & 3/LT BAG W/FILTER (20EA/CA)

## (undated) DEVICE — MAT PATIENT POSITIONING PREVALON (10EA/CA)

## (undated) DEVICE — TROCAR 5X100 NON BLADED Z-TH - READ KII (6/BX)

## (undated) DEVICE — TUBE E-T HI-LO CUFF 7.5MM (10EA/PK)

## (undated) DEVICE — CANNULA W/SEAL 5X100 Z-THRE - ADED KII (12/BX)

## (undated) DEVICE — SUTURE GENERAL

## (undated) DEVICE — TROCAR SEPARATOR 15MMZTHREAD - (6/BX)

## (undated) DEVICE — GLOVE SZ 7 BIOGEL PI MICRO - PF LF (50PR/BX 4BX/CA)

## (undated) DEVICE — ELECTRODE 850 FOAM ADHESIVE - HYDROGEL RADIOTRNSPRNT (50/PK)

## (undated) DEVICE — BAG RETRIEVAL 12/15 MM INZII (5EA/CA) THIS WILL REPLACE ITEM 75018

## (undated) DEVICE — RELOAD WITH GRIPPING SURFACE TECHNOLOGY BLUE 60MM (12EA/BX)

## (undated) DEVICE — GUIDE TRACHE TUBE INTUBATING STYLET 5.0-10.0MM 14FR (20EA/PK)

## (undated) DEVICE — PROTECTOR ULNA NERVE - (36PR/CA)

## (undated) DEVICE — SLEEVE, VASO, THIGH, MED

## (undated) DEVICE — NEEDLE MONOPTY 14GAX16CM - (10EA/CA)

## (undated) DEVICE — SET LEADWIRE 5 LEAD BEDSIDE DISPOSABLE ECG (1SET OF 5/EA)

## (undated) DEVICE — CHLORAPREP 26 ML APPLICATOR - ORANGE TINT(25/CA)

## (undated) DEVICE — TUBING CLEARLINK DUO-VENT - C-FLO (48EA/CA)

## (undated) DEVICE — MASK ANESTHESIA ADULT  - (100/CA)

## (undated) DEVICE — SET TUBING PNEUMOCLEAR HIGH FLOW SMOKE EVACUATION (10EA/BX)

## (undated) DEVICE — SUTURE 0 LIGATING REEL VICRYL PLUS (12PK/BX)

## (undated) DEVICE — RELOAD WITH GRIPPING SURGACE TECHNOLOGY GREEN 60MM (12EA/BX)

## (undated) DEVICE — STAPLER POWERED 60MM (3EA/BX)

## (undated) DEVICE — DRESSING NON ADHERENT 3 X 4 - STERILE (100/BX 12BX/CA)

## (undated) DEVICE — CANISTER SUCTION 3000ML MECHANICAL FILTER AUTO SHUTOFF MEDI-VAC NONSTERILE LF DISP  (40EA/CA)

## (undated) DEVICE — SUTURE2-0 27IN VCRL ANTI VIOL (36PK/BX)

## (undated) DEVICE — SET EXTENSION WITH 2 PORTS (48EA/CA) ***PART #2C8610 IS A SUBSTITUTE*****

## (undated) DEVICE — TOWEL STOP TIMEOUT SAFETY FLAG (40EA/CA)

## (undated) DEVICE — PREP FAST FLOSEAL 5ML

## (undated) DEVICE — LACTATED RINGERS INJ 1000 ML - (14EA/CA 60CA/PF)

## (undated) DEVICE — SYSTEM CALIBARATION  GASTRECTOMY 40FR (5/BX)

## (undated) DEVICE — SENSOR SPO2 NEO LNCS ADHESIVE (20/BX) SEE USER NOTES

## (undated) DEVICE — SEALER ARTICULATING TISSUE NSEAL (6/BX)

## (undated) DEVICE — TISSEEL 4ML ----MUST ORDER A MIN OF 6EA----

## (undated) DEVICE — NEPTUNE 4 PORT MANIFOLD - (20/PK)

## (undated) DEVICE — CLIP APPLIER 10MM ENDO LARGE (3EA/BX)

## (undated) DEVICE — APPLICATOR DUPLO SPRAYER (5EA/CA)

## (undated) DEVICE — PERISTRIP 60 STAPLE LINE REINFORCEMENT (6EA/CA)

## (undated) DEVICE — SUTURE 4-0 VICRYL PLUSFS-1 - 27 INCH (36/BX)

## (undated) DEVICE — GLOVE BIOGEL PI INDICATOR SZ 7.0 SURGICAL PF LF - (50/BX 4BX/CA)

## (undated) DEVICE — HEAD HOLDER JUNIOR/ADULT

## (undated) DEVICE — RELOAD WITH GRIPPING SURFACE TECHNOLOGY GOLD 60MM (12EA/BX)

## (undated) DEVICE — DRAPESURG STERI-DRAPE LONG - (10/BX 4BX/CA)

## (undated) DEVICE — GOWN WARMING STANDARD FLEX - (30/CA)

## (undated) DEVICE — SUCTION INSTRUMENT YANKAUER BULBOUS TIP W/O VENT (50EA/CA)

## (undated) DEVICE — PACK GASTRIC BANDING OR - (1/CA)

## (undated) DEVICE — ELECTRODE DUAL RETURN W/ CORD - (50/PK)